# Patient Record
Sex: FEMALE | Race: WHITE | Employment: OTHER | URBAN - METROPOLITAN AREA
[De-identification: names, ages, dates, MRNs, and addresses within clinical notes are randomized per-mention and may not be internally consistent; named-entity substitution may affect disease eponyms.]

---

## 2019-04-15 ENCOUNTER — ED HISTORICAL/CONVERTED ENCOUNTER (OUTPATIENT)
Dept: OTHER | Age: 74
End: 2019-04-15

## 2020-07-28 ENCOUNTER — TELEPHONE (OUTPATIENT)
Dept: INTERNAL MEDICINE CLINIC | Age: 75
End: 2020-07-28

## 2020-07-31 DIAGNOSIS — G62.9 NEUROPATHY: Primary | ICD-10-CM

## 2020-07-31 RX ORDER — GABAPENTIN 300 MG/1
CAPSULE ORAL
Qty: 90 CAP | Refills: 0 | Status: SHIPPED | OUTPATIENT
Start: 2020-07-31 | End: 2020-08-25

## 2020-08-16 RX ORDER — FUROSEMIDE 20 MG/1
TABLET ORAL
Qty: 90 TAB | Refills: 2 | Status: SHIPPED | OUTPATIENT
Start: 2020-08-16 | End: 2020-09-25 | Stop reason: SDUPTHER

## 2020-08-17 RX ORDER — BUSPIRONE HYDROCHLORIDE 10 MG/1
TABLET ORAL
Qty: 180 TAB | Refills: 1 | Status: SHIPPED | OUTPATIENT
Start: 2020-08-17 | End: 2020-10-19 | Stop reason: SDUPTHER

## 2020-08-18 DIAGNOSIS — F31.9 BIPOLAR DISORDER, UNSPECIFIED (HCC): ICD-10-CM

## 2020-08-18 RX ORDER — QUETIAPINE FUMARATE 50 MG/1
TABLET, FILM COATED ORAL
Qty: 90 TAB | Refills: 1 | Status: SHIPPED | OUTPATIENT
Start: 2020-08-18 | End: 2020-12-07 | Stop reason: SDUPTHER

## 2020-08-25 DIAGNOSIS — G62.9 NEUROPATHY: ICD-10-CM

## 2020-08-25 RX ORDER — GABAPENTIN 300 MG/1
CAPSULE ORAL
Qty: 90 CAP | Refills: 0 | Status: SHIPPED | OUTPATIENT
Start: 2020-08-25 | End: 2020-09-30 | Stop reason: SDUPTHER

## 2020-09-17 ENCOUNTER — OFFICE VISIT (OUTPATIENT)
Dept: INTERNAL MEDICINE CLINIC | Age: 75
End: 2020-09-17
Payer: MEDICARE

## 2020-09-17 VITALS
DIASTOLIC BLOOD PRESSURE: 70 MMHG | RESPIRATION RATE: 20 BRPM | HEART RATE: 84 BPM | TEMPERATURE: 97.5 F | SYSTOLIC BLOOD PRESSURE: 140 MMHG

## 2020-09-17 DIAGNOSIS — I48.0 PAROXYSMAL ATRIAL FIBRILLATION (HCC): ICD-10-CM

## 2020-09-17 DIAGNOSIS — F31.9 BIPOLAR I DISORDER, CURRENT EPISODE DEPRESSED (HCC): Primary | ICD-10-CM

## 2020-09-17 PROBLEM — D53.9 MACROCYTIC ANEMIA: Status: ACTIVE | Noted: 2018-12-30

## 2020-09-17 PROBLEM — R53.81 MALAISE: Status: ACTIVE | Noted: 2018-12-29

## 2020-09-17 PROBLEM — F31.78 BIPOLAR DISORDER, IN FULL REMISSION, MOST RECENT EPISODE MIXED (HCC): Status: ACTIVE | Noted: 2018-12-30

## 2020-09-17 PROBLEM — R53.1 GENERALIZED WEAKNESS: Status: ACTIVE | Noted: 2018-12-29

## 2020-09-17 PROBLEM — J01.00 ACUTE NON-RECURRENT MAXILLARY SINUSITIS: Status: ACTIVE | Noted: 2018-12-30

## 2020-09-17 PROBLEM — N18.30 CKD (CHRONIC KIDNEY DISEASE) STAGE 3, GFR 30-59 ML/MIN (HCC): Status: ACTIVE | Noted: 2019-10-08

## 2020-09-17 PROBLEM — F54 PSYCHOLOGICAL FACTORS AFFECTING MEDICAL CONDITION: Status: ACTIVE | Noted: 2019-03-10

## 2020-09-17 PROBLEM — E03.9 ACQUIRED HYPOTHYROIDISM: Status: ACTIVE | Noted: 2018-12-30

## 2020-09-17 PROBLEM — G54.6 PHANTOM LIMB PAIN (HCC): Status: ACTIVE | Noted: 2018-12-30

## 2020-09-17 PROCEDURE — G8427 DOCREV CUR MEDS BY ELIG CLIN: HCPCS | Performed by: INTERNAL MEDICINE

## 2020-09-17 PROCEDURE — G8400 PT W/DXA NO RESULTS DOC: HCPCS | Performed by: INTERNAL MEDICINE

## 2020-09-17 PROCEDURE — 99213 OFFICE O/P EST LOW 20 MIN: CPT | Performed by: INTERNAL MEDICINE

## 2020-09-17 PROCEDURE — 3017F COLORECTAL CA SCREEN DOC REV: CPT | Performed by: INTERNAL MEDICINE

## 2020-09-17 PROCEDURE — G8421 BMI NOT CALCULATED: HCPCS | Performed by: INTERNAL MEDICINE

## 2020-09-17 PROCEDURE — 1090F PRES/ABSN URINE INCON ASSESS: CPT | Performed by: INTERNAL MEDICINE

## 2020-09-17 PROCEDURE — G9717 DOC PT DX DEP/BP F/U NT REQ: HCPCS | Performed by: INTERNAL MEDICINE

## 2020-09-17 PROCEDURE — G8536 NO DOC ELDER MAL SCRN: HCPCS | Performed by: INTERNAL MEDICINE

## 2020-09-17 PROCEDURE — 1101F PT FALLS ASSESS-DOCD LE1/YR: CPT | Performed by: INTERNAL MEDICINE

## 2020-09-17 NOTE — PROGRESS NOTES
Joann Presley is a 77 y.o. female presenting for Chief Complaint Patient presents with  Depression HPI Avel Fairbanks is in and she seen the neurologist about her tremors in the have pretty much decided that is probably familial.  Suggested the potential that it was from the apixaban just familial.  I would favor familial.  Also her psychiatric PA has decreased the Seroquel without much help. Seems that the main reason for the visit today is that #1 Suzie's wheelchair is broken and is in need of replacement and #2 when she gets a replacement she needs an oversized chair because she is squished into this regular wheelchair and it is uncomfortable for her to sit in. A wheelchair is necessary for her Avel Fairbanks to be able to be at home. Without the wheelchair she would be unable to get to and from the toilet and be unable to care for toileting needs and she would be unable to get to the kitchen table to eat. Originally when she got a wheelchair she was able to self propel but she has gotten weaker with time and she is unable to self propel at the present time. However there is always someone around 24/7 who is able to push her around and propell the chair. No past medical history on file. Current Outpatient Medications on File Prior to Visit Medication Sig Dispense Refill  gabapentin (NEURONTIN) 300 mg capsule TAKE 1 CAPSULE BY MOUTH THREE TIMES A DAY 90 Cap 0  
 QUEtiapine (SEROquel) 50 mg tablet TAKE 1 TABLET BY MOUTH EVERYDAY AT BEDTIME 90 Tab 1  
 busPIRone (BUSPAR) 10 mg tablet TAKE 1 TABLET TWICE DAILY 180 Tab 1  
 furosemide (LASIX) 20 mg tablet TAKE 1 TABLET BY MOUTH EVERY DAY 90 Tab 2 No current facility-administered medications on file prior to visit. ROS Visit Vitals BP (!) 140/70 (BP 1 Location: Left arm, BP Patient Position: Sitting) Pulse 84 Temp 97.5 °F (36.4 °C) Resp 20 Physical Exam morbidly obese  woman seated in wheelchair.   She does look rather uncomfortable in the standard chair given her size. ENT is unrevealing lungs with distant breath sounds but no wheezes heart sounds are distant irregularly irregular with chronic atrial fib previous right AKA amputation Assessment & Plan they will tell us where they want to order the chair from and we will send a copy of this note to the insurance company. They are preparing to go to [place] by car and we discussed safety measures along the way. I will see her routinely in 3 or 4 months.

## 2020-09-17 NOTE — PROGRESS NOTES
Danville Adam presents today for Chief Complaint Patient presents with  Depression Is someone accompanying this pt? yes Is the patient using any DME equipment during OV? yes Depression Screening: 
3 most recent PHQ Screens 9/17/2020 PHQ Not Done Active Diagnosis of Depression or Bipolar Disorder Learning Assessment: 
Learning Assessment 9/17/2020 PRIMARY LEARNER Patient HIGHEST LEVEL OF EDUCATION - PRIMARY LEARNER  GRADUATED HIGH SCHOOL OR GED  
BARRIERS PRIMARY LEARNER NONE  
CO-LEARNER CAREGIVER No  
PRIMARY LANGUAGE ENGLISH  
LEARNER PREFERENCE PRIMARY DEMONSTRATION  
ANSWERED BY patient RELATIONSHIP SELF Abuse Screening: No flowsheet data found. Fall Risk Fall Risk Assessment, last 12 mths 9/17/2020 Able to walk? No  
 
 
ADL No flowsheet data found. Health Maintenance reviewed and discussed and ordered per Provider. Health Maintenance Due Topic Date Due  
 Hepatitis C Screening  1945  
 DTaP/Tdap/Td series (1 - Tdap) 08/18/1967  Lipid Screen  08/18/1986  Shingrix Vaccine Age 50> (1 of 2) 08/18/1996  Breast Cancer Screen Mammogram  08/18/1996  
 FOBT Q1Y Age 50-75  08/18/1996  GLAUCOMA SCREENING Q2Y  08/18/2011  Bone Densitometry (Dexa) Screening  08/18/2011  Pneumococcal 65+ years (1 of 1 - PPSV23) 08/18/2011  Flu Vaccine (1) 09/01/2020  Medicare Yearly Exam  09/17/2020 Florencia Fields Coordination of Care: 1. Have you been to the ER, urgent care clinic since your last visit? Hospitalized since your last visit? no 
 
2. Have you seen or consulted any other health care providers outside of the [address] [place] [address] since your last visit? Include any pap smears or colon screening.  no

## 2020-09-18 ENCOUNTER — TELEPHONE (OUTPATIENT)
Dept: INTERNAL MEDICINE CLINIC | Age: 75
End: 2020-09-18

## 2020-09-18 DIAGNOSIS — Z89.9 AMPUTEE: Primary | ICD-10-CM

## 2020-09-18 NOTE — TELEPHONE ENCOUNTER
Patient calls to let you know the place that she wants her wheelchair order sent to is [facility] and the phone number is [phone] or [phone]    PLEASE SIGN ORDER SO THIS ENCOUNTER CAN BE CLOSED

## 2020-09-23 RX ORDER — BACLOFEN 10 MG/1
TABLET ORAL
Qty: 270 TAB | Refills: 2 | Status: SHIPPED | OUTPATIENT
Start: 2020-09-23

## 2020-09-25 DIAGNOSIS — R60.9 EDEMA, UNSPECIFIED TYPE: Primary | ICD-10-CM

## 2020-09-28 RX ORDER — FUROSEMIDE 20 MG/1
40 TABLET ORAL DAILY
Qty: 180 TAB | Refills: 3 | Status: SHIPPED | OUTPATIENT
Start: 2020-09-28 | End: 2021-01-01

## 2020-09-30 DIAGNOSIS — G62.9 NEUROPATHY: ICD-10-CM

## 2020-09-30 RX ORDER — GABAPENTIN 300 MG/1
300 CAPSULE ORAL 3 TIMES DAILY
Qty: 90 CAP | Refills: 0 | Status: SHIPPED | OUTPATIENT
Start: 2020-09-30 | End: 2020-10-30

## 2020-09-30 RX ORDER — LANOLIN ALCOHOL/MO/W.PET/CERES
CREAM (GRAM) TOPICAL
Qty: 90 TAB | Refills: 2 | Status: SHIPPED | OUTPATIENT
Start: 2020-09-30 | End: 2021-01-01

## 2020-10-12 ENCOUNTER — APPOINTMENT (OUTPATIENT)
Dept: CT IMAGING | Age: 75
End: 2020-10-12
Attending: EMERGENCY MEDICINE
Payer: MEDICARE

## 2020-10-12 ENCOUNTER — HOSPITAL ENCOUNTER (OUTPATIENT)
Age: 75
Setting detail: OBSERVATION
Discharge: HOME | End: 2020-10-15
Attending: EMERGENCY MEDICINE | Admitting: INTERNAL MEDICINE
Payer: MEDICARE

## 2020-10-12 DIAGNOSIS — N39.0 URINARY TRACT INFECTION WITH HEMATURIA, SITE UNSPECIFIED: Primary | ICD-10-CM

## 2020-10-12 DIAGNOSIS — R31.9 URINARY TRACT INFECTION WITH HEMATURIA, SITE UNSPECIFIED: Primary | ICD-10-CM

## 2020-10-12 DIAGNOSIS — R77.8 ELEVATED TROPONIN: ICD-10-CM

## 2020-10-12 LAB
ALBUMIN SERPL-MCNC: 2.9 G/DL (ref 3.5–4.7)
ALBUMIN SERPL-MCNC: 3.3 G/DL (ref 3.5–4.7)
ALBUMIN/GLOB SERPL: 0.9 {RATIO}
ALBUMIN/GLOB SERPL: 0.9 {RATIO}
ALP SERPL-CCNC: 59 U/L (ref 38–126)
ALP SERPL-CCNC: 65 U/L (ref 38–126)
ALT SERPL-CCNC: 15 U/L (ref 3–52)
ALT SERPL-CCNC: 16 U/L (ref 3–52)
ANION GAP SERPL CALC-SCNC: 13 MMOL/L
ANION GAP SERPL CALC-SCNC: 14 MMOL/L
APPEARANCE UR: ABNORMAL
AST SERPL W P-5'-P-CCNC: 25 U/L (ref 14–74)
AST SERPL W P-5'-P-CCNC: 27 U/L (ref 14–74)
BACTERIA URNS QL MICRO: ABNORMAL /HPF
BASOPHILS # BLD: 0 K/UL
BASOPHILS NFR BLD: 0 %
BILIRUB SERPL-MCNC: 0.4 MG/DL (ref 0.2–1)
BILIRUB SERPL-MCNC: 0.5 MG/DL (ref 0.2–1)
BILIRUB UR QL: NEGATIVE
BUN SERPL-MCNC: 35 MG/DL (ref 9–21)
BUN SERPL-MCNC: 35 MG/DL (ref 9–21)
BUN/CREAT SERPL: 27
BUN/CREAT SERPL: 27
CA-I BLD-MCNC: 7.8 MG/DL (ref 8.5–10.5)
CA-I BLD-MCNC: 8.5 MG/DL (ref 8.5–10.5)
CHLORIDE SERPL-SCNC: 103 MMOL/L (ref 94–111)
CHLORIDE SERPL-SCNC: 106 MMOL/L (ref 94–111)
CO2 SERPL-SCNC: 21 MMOL/L (ref 21–33)
CO2 SERPL-SCNC: 22 MMOL/L (ref 21–33)
COLOR UR: ABNORMAL
CREAT SERPL-MCNC: 1.29 MG/DL (ref 0.7–1.2)
CREAT SERPL-MCNC: 1.3 MG/DL (ref 0.7–1.2)
DIFFERENTIAL METHOD BLD: ABNORMAL
EOSINOPHIL # BLD: 0 K/UL
EOSINOPHIL NFR BLD: 0 %
EPITH CASTS URNS QL MICRO: ABNORMAL /LPF (ref 0–20)
ERYTHROCYTE [DISTWIDTH] IN BLOOD BY AUTOMATED COUNT: 12.6 % (ref 11.6–14.5)
GLOBULIN SER CALC-MCNC: 3.2 G/DL
GLOBULIN SER CALC-MCNC: 3.6 G/DL
GLUCOSE SERPL-MCNC: 162 MG/DL (ref 70–110)
GLUCOSE SERPL-MCNC: 166 MG/DL (ref 70–110)
GLUCOSE UR STRIP.AUTO-MCNC: NEGATIVE MG/DL
HCT VFR BLD AUTO: 37.7 % (ref 35–45)
HGB BLD-MCNC: 11.9 G/DL (ref 12–16)
HGB UR QL STRIP: ABNORMAL
IMM GRANULOCYTES # BLD AUTO: 0 K/UL
IMM GRANULOCYTES NFR BLD AUTO: 0 %
KETONES UR QL STRIP.AUTO: NEGATIVE MG/DL
LEUKOCYTE ESTERASE UR QL STRIP.AUTO: ABNORMAL
LYMPHOCYTES # BLD: 0.9 K/UL
LYMPHOCYTES NFR BLD: 7 %
MCH RBC QN AUTO: 34.7 PG (ref 24–34)
MCHC RBC AUTO-ENTMCNC: 31.6 G/DL (ref 31–37)
MCV RBC AUTO: 109.9 FL (ref 74–97)
MONOCYTES # BLD: 1.6 K/UL
MONOCYTES NFR BLD: 13 %
NEUTS BAND NFR BLD MANUAL: 10 %
NEUTS SEG # BLD: 10.1 K/UL
NEUTS SEG NFR BLD: 70 %
NITRITE UR QL STRIP.AUTO: POSITIVE
PH UR STRIP: 6 [PH] (ref 5–9)
PLATELET # BLD AUTO: 213 K/UL (ref 135–420)
PMV BLD AUTO: 10 FL (ref 9.2–11.8)
POTASSIUM SERPL-SCNC: 4.2 MMOL/L (ref 3.2–5.1)
POTASSIUM SERPL-SCNC: 4.7 MMOL/L (ref 3.2–5.1)
PROT SERPL-MCNC: 6.1 G/DL (ref 6.1–8.4)
PROT SERPL-MCNC: 6.9 G/DL (ref 6.1–8.4)
PROT UR STRIP-MCNC: 30 MG/DL
RBC # BLD AUTO: 3.43 M/UL (ref 4.2–5.3)
RBC #/AREA URNS HPF: ABNORMAL /HPF (ref 0–2)
RBC MORPH BLD: ABNORMAL
SODIUM SERPL-SCNC: 139 MMOL/L (ref 135–145)
SODIUM SERPL-SCNC: 140 MMOL/L (ref 135–145)
SP GR UR REFRACTOMETRY: 1.02 (ref 1–1.03)
TROPONIN I SERPL-MCNC: 0.11 NG/ML (ref 0.02–0.05)
TROPONIN I SERPL-MCNC: 0.12 NG/ML (ref 0.02–0.05)
TROPONIN I SERPL-MCNC: 0.27 NG/ML (ref 0.02–0.05)
UROBILINOGEN UR QL STRIP.AUTO: 0.2 EU/DL (ref 0.2–1)
WBC # BLD AUTO: 12.6 K/UL (ref 4.6–13.2)
WBC URNS QL MICRO: ABNORMAL /HPF (ref 0–4)

## 2020-10-12 PROCEDURE — 84484 ASSAY OF TROPONIN QUANT: CPT

## 2020-10-12 PROCEDURE — 87077 CULTURE AEROBIC IDENTIFY: CPT

## 2020-10-12 PROCEDURE — 81001 URINALYSIS AUTO W/SCOPE: CPT

## 2020-10-12 PROCEDURE — 80053 COMPREHEN METABOLIC PANEL: CPT

## 2020-10-12 PROCEDURE — 70450 CT HEAD/BRAIN W/O DYE: CPT

## 2020-10-12 PROCEDURE — 87186 SC STD MICRODIL/AGAR DIL: CPT

## 2020-10-12 PROCEDURE — 74011250637 HC RX REV CODE- 250/637: Performed by: NURSE PRACTITIONER

## 2020-10-12 PROCEDURE — 74011250637 HC RX REV CODE- 250/637: Performed by: EMERGENCY MEDICINE

## 2020-10-12 PROCEDURE — 74011000258 HC RX REV CODE- 258: Performed by: EMERGENCY MEDICINE

## 2020-10-12 PROCEDURE — 99285 EMERGENCY DEPT VISIT HI MDM: CPT

## 2020-10-12 PROCEDURE — 74011250636 HC RX REV CODE- 250/636: Performed by: EMERGENCY MEDICINE

## 2020-10-12 PROCEDURE — 93005 ELECTROCARDIOGRAM TRACING: CPT

## 2020-10-12 PROCEDURE — 99218 HC RM OBSERVATION: CPT

## 2020-10-12 PROCEDURE — 96365 THER/PROPH/DIAG IV INF INIT: CPT

## 2020-10-12 PROCEDURE — 85025 COMPLETE CBC W/AUTO DIFF WBC: CPT

## 2020-10-12 PROCEDURE — 87086 URINE CULTURE/COLONY COUNT: CPT

## 2020-10-12 RX ORDER — BACLOFEN 10 MG/1
10 TABLET ORAL 3 TIMES DAILY
Status: DISCONTINUED | OUTPATIENT
Start: 2020-10-12 | End: 2020-10-15 | Stop reason: HOSPADM

## 2020-10-12 RX ORDER — FUROSEMIDE 40 MG/1
40 TABLET ORAL DAILY
Status: DISCONTINUED | OUTPATIENT
Start: 2020-10-13 | End: 2020-10-14

## 2020-10-12 RX ORDER — QUETIAPINE FUMARATE 25 MG/1
50 TABLET, FILM COATED ORAL
Status: DISCONTINUED | OUTPATIENT
Start: 2020-10-12 | End: 2020-10-15 | Stop reason: HOSPADM

## 2020-10-12 RX ORDER — AMIODARONE HYDROCHLORIDE 200 MG/1
1 TABLET ORAL DAILY
COMMUNITY
Start: 2020-08-02 | End: 2021-01-01

## 2020-10-12 RX ORDER — LEVOTHYROXINE SODIUM 100 UG/1
200 TABLET ORAL DAILY
Status: DISCONTINUED | OUTPATIENT
Start: 2020-10-13 | End: 2020-10-15 | Stop reason: HOSPADM

## 2020-10-12 RX ORDER — ACETAMINOPHEN 650 MG/1
650 SUPPOSITORY RECTAL
Status: DISCONTINUED | OUTPATIENT
Start: 2020-10-12 | End: 2020-10-15 | Stop reason: HOSPADM

## 2020-10-12 RX ORDER — ONDANSETRON 2 MG/ML
4 INJECTION INTRAMUSCULAR; INTRAVENOUS
Status: DISCONTINUED | OUTPATIENT
Start: 2020-10-12 | End: 2020-10-15 | Stop reason: HOSPADM

## 2020-10-12 RX ORDER — LANOLIN ALCOHOL/MO/W.PET/CERES
1 CREAM (GRAM) TOPICAL 2 TIMES DAILY
COMMUNITY

## 2020-10-12 RX ORDER — FOLIC ACID/VIT B COMPLEX AND C 0.8 MG
1 TABLET ORAL DAILY
COMMUNITY
Start: 2020-09-24 | End: 2021-01-01 | Stop reason: SDUPTHER

## 2020-10-12 RX ORDER — FOLIC ACID/VIT B COMPLEX AND C 0.8 MG
1 TABLET ORAL DAILY
COMMUNITY

## 2020-10-12 RX ORDER — SODIUM CHLORIDE 0.9 % (FLUSH) 0.9 %
5-40 SYRINGE (ML) INJECTION AS NEEDED
Status: DISCONTINUED | OUTPATIENT
Start: 2020-10-12 | End: 2020-10-15 | Stop reason: HOSPADM

## 2020-10-12 RX ORDER — DIMETHICONE 13 MG/ML
425 LOTION TOPICAL 2 TIMES DAILY
COMMUNITY

## 2020-10-12 RX ORDER — LORATADINE 10 MG/1
10 TABLET ORAL DAILY
Status: DISCONTINUED | OUTPATIENT
Start: 2020-10-13 | End: 2020-10-13

## 2020-10-12 RX ORDER — FLUTICASONE PROPIONATE 50 MCG
2 SPRAY, SUSPENSION (ML) NASAL DAILY
COMMUNITY
Start: 2019-01-01 | End: 2021-01-01

## 2020-10-12 RX ORDER — OMEGA-3 FATTY ACIDS 1000 MG
1 CAPSULE ORAL DAILY
COMMUNITY

## 2020-10-12 RX ORDER — SODIUM CHLORIDE 0.9 % (FLUSH) 0.9 %
5-40 SYRINGE (ML) INJECTION EVERY 8 HOURS
Status: DISCONTINUED | OUTPATIENT
Start: 2020-10-12 | End: 2020-10-13

## 2020-10-12 RX ORDER — GABAPENTIN 300 MG/1
300 CAPSULE ORAL 3 TIMES DAILY
Status: DISCONTINUED | OUTPATIENT
Start: 2020-10-12 | End: 2020-10-15 | Stop reason: HOSPADM

## 2020-10-12 RX ORDER — ACETAMINOPHEN 500 MG
1000 TABLET ORAL
COMMUNITY
End: 2020-10-12

## 2020-10-12 RX ORDER — POLYETHYLENE GLYCOL 3350 17 G/17G
17 POWDER, FOR SOLUTION ORAL DAILY PRN
Status: DISCONTINUED | OUTPATIENT
Start: 2020-10-12 | End: 2020-10-15 | Stop reason: HOSPADM

## 2020-10-12 RX ORDER — LANOLIN ALCOHOL/MO/W.PET/CERES
1 CREAM (GRAM) TOPICAL
Status: DISCONTINUED | OUTPATIENT
Start: 2020-10-13 | End: 2020-10-15 | Stop reason: HOSPADM

## 2020-10-12 RX ORDER — ONDANSETRON 4 MG/1
4 TABLET, ORALLY DISINTEGRATING ORAL
Status: DISCONTINUED | OUTPATIENT
Start: 2020-10-12 | End: 2020-10-15 | Stop reason: HOSPADM

## 2020-10-12 RX ORDER — AMIODARONE HYDROCHLORIDE 200 MG/1
200 TABLET ORAL DAILY
Status: DISCONTINUED | OUTPATIENT
Start: 2020-10-13 | End: 2020-10-15 | Stop reason: HOSPADM

## 2020-10-12 RX ORDER — MINERAL OIL
1 ENEMA (ML) RECTAL DAILY
COMMUNITY

## 2020-10-12 RX ORDER — APIXABAN 2.5 MG/1
1 TABLET, FILM COATED ORAL DAILY
COMMUNITY
Start: 2020-09-18 | End: 2020-12-16

## 2020-10-12 RX ORDER — DICLOFENAC SODIUM 10 MG/G
1 GEL TOPICAL AS NEEDED
COMMUNITY
End: 2021-01-01 | Stop reason: ALTCHOICE

## 2020-10-12 RX ORDER — DIVALPROEX SODIUM 500 MG/1
1000 TABLET, EXTENDED RELEASE ORAL EVERY 12 HOURS
Status: DISCONTINUED | OUTPATIENT
Start: 2020-10-12 | End: 2020-10-15 | Stop reason: HOSPADM

## 2020-10-12 RX ORDER — ACETAMINOPHEN 325 MG/1
650 TABLET ORAL
Status: DISCONTINUED | OUTPATIENT
Start: 2020-10-12 | End: 2020-10-15 | Stop reason: HOSPADM

## 2020-10-12 RX ORDER — LEVOTHYROXINE SODIUM 200 UG/1
1 TABLET ORAL DAILY
COMMUNITY
Start: 2020-10-08 | End: 2021-01-01

## 2020-10-12 RX ORDER — GABAPENTIN 100 MG/1
300 CAPSULE ORAL ONCE
Status: COMPLETED | OUTPATIENT
Start: 2020-10-12 | End: 2020-10-12

## 2020-10-12 RX ORDER — LISINOPRIL 5 MG/1
10 TABLET ORAL DAILY
Status: DISCONTINUED | OUTPATIENT
Start: 2020-10-13 | End: 2020-10-15 | Stop reason: HOSPADM

## 2020-10-12 RX ORDER — DIVALPROEX SODIUM 500 MG/1
1000 TABLET, EXTENDED RELEASE ORAL EVERY 12 HOURS
COMMUNITY
Start: 2019-10-10 | End: 2021-01-01 | Stop reason: DRUGHIGH

## 2020-10-12 RX ORDER — LANOLIN ALCOHOL/MO/W.PET/CERES
400 CREAM (GRAM) TOPICAL DAILY
Status: DISCONTINUED | OUTPATIENT
Start: 2020-10-13 | End: 2020-10-15 | Stop reason: HOSPADM

## 2020-10-12 RX ORDER — LISINOPRIL 10 MG/1
1 TABLET ORAL DAILY
COMMUNITY
End: 2021-01-01

## 2020-10-12 RX ORDER — BUSPIRONE HYDROCHLORIDE 5 MG/1
10 TABLET ORAL 2 TIMES DAILY
Status: DISCONTINUED | OUTPATIENT
Start: 2020-10-12 | End: 2020-10-14

## 2020-10-12 RX ORDER — IBUPROFEN 200 MG
400 TABLET ORAL DAILY PRN
COMMUNITY
End: 2021-01-01 | Stop reason: SDUPTHER

## 2020-10-12 RX ADMIN — BUSPIRONE HYDROCHLORIDE 10 MG: 5 TABLET ORAL at 22:00

## 2020-10-12 RX ADMIN — DIVALPROEX SODIUM 1000 MG: 500 TABLET, EXTENDED RELEASE ORAL at 20:39

## 2020-10-12 RX ADMIN — CEFTRIAXONE SODIUM 1 G: 1 INJECTION, POWDER, FOR SOLUTION INTRAMUSCULAR; INTRAVENOUS at 15:07

## 2020-10-12 RX ADMIN — SODIUM CHLORIDE 500 ML: 9 INJECTION, SOLUTION INTRAVENOUS at 13:30

## 2020-10-12 RX ADMIN — BACLOFEN 10 MG: 10 TABLET ORAL at 22:00

## 2020-10-12 RX ADMIN — GABAPENTIN 300 MG: 100 CAPSULE ORAL at 16:10

## 2020-10-12 RX ADMIN — Medication 10 ML: at 20:39

## 2020-10-12 RX ADMIN — ACETAMINOPHEN 650 MG: 325 TABLET, FILM COATED ORAL at 20:40

## 2020-10-12 RX ADMIN — GABAPENTIN 300 MG: 300 CAPSULE ORAL at 22:00

## 2020-10-12 NOTE — ED TRIAGE NOTES
Pt reports that she woke up this morning feeling weak, reports that she has chronic UTIs and she feels as if she has one of her \"normal\" UTIs. Reports foul odor to the urine, burning when urinating, and frequency. Per EMS temp was 103 upon their arrival to the home, pt does take tylenol every morning and did take some this morning, temp upon arrival was 100.1.

## 2020-10-12 NOTE — ED PROVIDER NOTES
EMERGENCY DEPARTMENT HISTORY AND PHYSICAL EXAM 
 
 
Date: 10/12/2020 Patient Name: Jenniffer Watts History of Presenting Illness Chief Complaint Patient presents with  Fatigue History Provided By: Patient HPI: Jenniffer Watts, 77 y.o. female with PMHx of R AKA presents to the ED via EMS with complaints of fatigue. Pt states she has had sxs of weakness and fatigue since 0500 this morning. Pt notes she was unable to get out of bed this morning because of having generalized body aches, prompting her call to EMS. Pt notes she usually walks by herself with her wheelchair but denies being able to ambulate today. Pt also notes of burning with urination and urinary frequency for \"a while\" now. Pt also notes it feels as if her R leg spasms when she urinates. Pt endorses a fever, stating she had a fever of 103 at home but had a 100.1 fever upon arrival of EMS. Pt endorses taking Tylenol every morning that she believes could be why her temperature had decreased. Pt also currently c/o shoulder and neck pain, but denies chills or headache. There are no other complaints, changes, or physical findings at this time. PCP: Alana Moeller MD 
 
Current Outpatient Medications Medication Sig Dispense Refill  divalproex ER (DEPAKOTE ER) 500 mg ER tablet Take 1,000 mg by mouth every twelve (12) hours.  fluticasone propionate (FLONASE) 50 mcg/actuation nasal spray 2 Sprays by Both Nostrils route daily.  fexofenadine (Allegra Allergy) 180 mg tablet Take 1 Tab by mouth daily.  omega-3 fatty acids (Fish Oil Concentrate) 1,000 mg cap Take 1 Tab by mouth daily.  magnesium oxide (MAG-OX) 400 mg tablet Take 1 Tab by mouth daily.  b complex-vitamin c-folic acid 0.8 mg (Isabel-Seth) 0.8 mg tab tablet Take 1 Tab by mouth daily.  diclofenac (Voltaren) 1 % gel Apply 1 Applicator to affected area as needed.  acetaminophen (TYLENOL) 500 mg tablet Take 1,000 mg by mouth two (2) times daily as needed.  amiodarone (CORDARONE) 200 mg tablet Take 1 Tab by mouth daily.  Eliquis 2.5 mg tablet Take 1 Tab by mouth daily.  cranberry extract 425 mg cap Take 425 mg by mouth two (2) times a day.  Isabel-Seth 0.8 mg tab tablet Take 1 Tab by mouth daily.  ibuprofen (MOTRIN) 200 mg tablet Take 400 mg by mouth daily as needed.  levothyroxine (SYNTHROID) 200 mcg tablet Take 1 Tab by mouth daily.  lisinopriL (PRINIVIL, ZESTRIL) 10 mg tablet Take 1 Tab by mouth daily.  gabapentin (NEURONTIN) 300 mg capsule Take 1 Cap by mouth three (3) times daily. Max Daily Amount: 900 mg. Indications: neuropathic pain 90 Cap 0  
 ferrous sulfate 325 mg (65 mg iron) tablet TAKE 1 TABLET EVERY DAY 90 Tab 2  
 furosemide (LASIX) 20 mg tablet Take 2 Tabs by mouth daily. 180 Tab 3  
 baclofen (LIORESAL) 10 mg tablet TAKE 1 TABLET THREE TIMES DAILY 270 Tab 2  
 QUEtiapine (SEROquel) 50 mg tablet TAKE 1 TABLET BY MOUTH EVERYDAY AT BEDTIME 90 Tab 1  
 busPIRone (BUSPAR) 10 mg tablet TAKE 1 TABLET TWICE DAILY 180 Tab 1 Past History Past Medical History: 
Past Medical History:  
Diagnosis Date  CHF (congestive heart failure) ([facility] [address]  Chronic neck and back pain  Frequent UTI  Hypercholesteremia  Hypertension  Obese Past Surgical History: 
Past Surgical History:  
Procedure Laterality Date  HX ABOVE KNEE AMPUTATION    
 right  HX CERVICAL FUSION    
 HX CHOLECYSTECTOMY  HX GASTRIC BYPASS  HX HYSTERECTOMY  HX ORTHOPAEDIC    
 neck surgery  HX PARTIAL THYROIDECTOMY Family History: 
History reviewed. No pertinent family history. Social History: 
Social History Tobacco Use  Smoking status: Never Smoker  Smokeless tobacco: Never Used Substance Use Topics  Alcohol use: Yes Comment: rare  Drug use: Never Allergies: Allergies Allergen Reactions  Codeine Unknown (comments)  Hydromorphone Unknown (comments) Patient states it makes her not stop talking, jittery  Prednisone Unknown (comments)  Succinylcholine Chloride Unknown (comments) Review of Systems Review of Systems Constitutional: Positive for fatigue and fever. Negative for chills and diaphoresis. HENT: Negative for congestion, ear pain and sore throat. Eyes: Negative for pain, redness and visual disturbance. Respiratory: Negative for cough, shortness of breath and wheezing. Cardiovascular: Negative for chest pain and leg swelling. Gastrointestinal: Negative for abdominal pain, diarrhea, nausea and vomiting. Endocrine: Negative for polydipsia, polyphagia and polyuria. Genitourinary: Positive for frequency. Negative for dysuria and hematuria. Burning with urination Musculoskeletal: Negative for neck pain and neck stiffness. Skin: Negative for color change and pallor. Neurological: Positive for weakness. Negative for light-headedness and headaches. Psychiatric/Behavioral: Negative for confusion. The patient is not nervous/anxious. Physical Exam  
Physical Exam 
Vitals signs and nursing note reviewed. Constitutional:   
   General: She is awake. She is not in acute distress. Appearance: She is well-developed and well-groomed. She is obese. HENT:  
   Head: Normocephalic and atraumatic. Eyes:  
   Extraocular Movements: Extraocular movements intact. Pupils: Pupils are equal, round, and reactive to light. Neck: Musculoskeletal: Full passive range of motion without pain, normal range of motion and neck supple. Cardiovascular:  
   Rate and Rhythm: Normal rate and regular rhythm. Heart sounds: Normal heart sounds. Pulmonary:  
   Effort: Pulmonary effort is normal.  
   Breath sounds: Normal breath sounds and air entry. Abdominal:  
   Palpations: Abdomen is soft. Feet:  
   Right foot: Amputation: Right leg is amputated above knee. Skin: 
   General: Skin is warm and dry. Comments: Bruises easily in bilateral upper extremities Neurological:  
   General: No focal deficit present. Mental Status: She is alert and oriented to person, place, and time. Psychiatric:     
   Attention and Perception: Attention and perception normal.     
   Mood and Affect: Mood and affect normal.     
   Speech: Speech normal.     
   Behavior: Behavior normal. Behavior is cooperative. Thought Content: Thought content normal.     
   Cognition and Memory: Cognition and memory normal.     
   Judgment: Judgment normal.  
 
 
 
Diagnostic Study Results Labs - Recent Results (from the past 12 hour(s)) URINALYSIS W/ RFLX MICROSCOPIC Collection Time: 10/12/20 12:30 PM  
Result Value Ref Range Color CIT Group Appearance Cloudy (A) Clear Specific gravity 1.020 1.003 - 1.035    
 pH (UA) 6.0 5.0 - 9.0 Protein 30 (A) Negative mg/dL Glucose Negative Negative mg/dL Ketone Negative Negative mg/dL Bilirubin Negative Negative Blood Large (A) Negative Urobilinogen 0.2 0.2 - 1.0 EU/dL Nitrites Positive (A) Negative Leukocyte Esterase Large (A) Negative URINE MICROSCOPIC Collection Time: 10/12/20 12:30 PM  
Result Value Ref Range WBC  0 - 4 /hpf  
 RBC 5-10 0 - 2 /hpf Epithelial cells Few 0 - 20 /lpf Bacteria 3+ (A) None /hpf  
CBC WITH AUTOMATED DIFF Collection Time: 10/12/20 12:55 PM  
Result Value Ref Range WBC 12.6 4.6 - 13.2 K/uL  
 RBC 3.43 (L) 4.20 - 5.30 M/uL  
 HGB 11.9 (L) 12.0 - 16.0 g/dL HCT 37.7 35.0 - 45.0 % .9 (H) 74.0 - 97.0 FL  
 MCH 34.7 (H) 24.0 - 34.0 PG  
 MCHC 31.6 31.0 - 37.0 g/dL  
 RDW 12.6 11.6 - 14.5 % PLATELET 279 528 - 236 K/uL MPV 10.0 9.2 - 11.8 FL  
 NEUTROPHILS 70 % BAND NEUTROPHILS 10 % LYMPHOCYTES 7 % MONOCYTES 13 % EOSINOPHILS 0 % BASOPHILS 0 % IMMATURE GRANULOCYTES 0 %  
 ABS. NEUTROPHILS 10.1 K/UL  
 ABS. LYMPHOCYTES 0.9 K/UL  
 ABS. MONOCYTES 1.6 K/UL  
 ABS. EOSINOPHILS 0.0 K/UL  
 ABS. BASOPHILS 0.0 K/UL  
 ABS. IMM. GRANS. 0.0 K/UL  
 DF Manual    
 RBC COMMENTS Macrocytosis 1+ TROPONIN I Collection Time: 10/12/20  3:50 PM  
Result Value Ref Range Troponin-I, Qt. 0.11 (H) 0.02 - 0.05 ng/mL METABOLIC PANEL, COMPREHENSIVE Collection Time: 10/12/20  3:50 PM  
Result Value Ref Range Sodium 140 135 - 145 mmol/L Potassium 4.2 3.2 - 5.1 mmol/L Chloride 106 94 - 111 mmol/L  
 CO2 21 21 - 33 mmol/L Anion gap 13 mmol/L Glucose 162 (H) 70 - 110 mg/dL BUN 35 (H) 9 - 21 mg/dL Creatinine 1.30 (H) 0.70 - 1.20 mg/dL BUN/Creatinine ratio 27 GFR est AA 49 ml/min/1.73m2 GFR est non-AA 40 ml/min/1.73m2 Calcium 7.8 (L) 8.5 - 10.5 mg/dL Bilirubin, total 0.5 0.2 - 1.0 mg/dL AST (SGOT) 27 14 - 74 U/L  
 ALT (SGPT) 16 3 - 52 U/L Alk. phosphatase 59 38 - 126 U/L Protein, total 6.1 6.1 - 8.4 g/dL Albumin 2.9 (L) 3.5 - 4.7 g/dL Globulin 3.2 g/dL A-G Ratio 0.9 Radiologic Studies -  
CT HEAD WO CONT Final Result IMPRESSION:  
  
  
1. No acute intracranial abnormality. 2. Mild periventricular white matter low-attenuation, as can be seen with  
chronic ischemic microvascular change. CT Results  (Last 48 hours) 10/12/20 1317  CT HEAD WO CONT Final result Impression:  IMPRESSION:  
   
   
1. No acute intracranial abnormality. 2. Mild periventricular white matter low-attenuation, as can be seen with  
chronic ischemic microvascular change. Narrative:  EXAM: CT head INDICATION: Fatigue with near syncopal episode COMPARISON: CT head 10/11/2018 TECHNIQUE: Axial CT imaging of the head was performed without intravenous  
contrast. Standard multiplanar coronal and sagittal reformatted images were  
obtained and are included in interpretation. One or more dose reduction techniques were used on this CT: automated exposure  
control, adjustment of the mAs and/or kVp according to patient size, and  
iterative reconstruction techniques. The specific techniques used on this CT  
exam have been documented in the patient's electronic medical record. Digital  
Imaging and Communications in Medicine (DICOM) format image data are available  
to nonaffiliated external healthcare facilities or entities on a secure, media  
free, reciprocally searchable basis with patient authorization for at least a  
12-month period after this study. _______________ FINDINGS:  
   
BRAIN AND POSTERIOR FOSSA: The sulci, folia, ventricles and basal cisterns are  
within normal limits for the patient?s age. There is no intracranial hemorrhage,  
mass effect, or midline shift. Gray-white matter differentiation is within  
normal limits. Mild periventricular white matter low-attenuation is noted. EXTRA-AXIAL SPACES AND MENINGES: There are no abnormal extra-axial fluid  
collections. CALVARIUM: Intact. SINUSES: Paranasal sinuses and mastoid air cells are clear. OTHER: None.  
   
_______________ Medical Decision Making and ED Course I am the first provider for this patient. I reviewed the vital signs, available nursing notes, past medical history, past surgical history, family history and social history. Vital Signs-Reviewed the patient's vital signs. Patient Vitals for the past 12 hrs: 
 Temp Pulse Resp BP SpO2  
10/12/20 1530  67 18 (!) 113/59 100 % 10/12/20 1500  65 17 106/72 99 % 10/12/20 1430  66 17 137/71 99 % 10/12/20 1400  65 17 (!) 113/52 99 % 10/12/20 1330  63 18 124/61 99 % 10/12/20 1300  67 17 132/69 100 % 10/12/20 1235  65 18 (!) 108/51 99 % 10/12/20 1219 100.1 °F (37.8 °C) 64 17 (!) 133/56 99 % EKG:  normal sinus rhythm, Incomplete LBBB. Rate 107 ED Course: Initial assessment performed. The patients presenting problems have been discussed, and they are in agreement with the care plan formulated and outlined with them. I have encouraged them to ask questions as they arise throughout their visit. 1819: I spoke with Dr. Natalia Valiente, hospitalist, in regards to pt admission; physician agreed to pt admission for observation. Provider Notes (Medical Decision Making):  
 
 
Procedures Disposition DISCHARGE PLAN: 
1. Current Discharge Medication List  
  
CONTINUE these medications which have NOT CHANGED Details  
gabapentin (NEURONTIN) 300 mg capsule Take 1 Cap by mouth three (3) times daily. Max Daily Amount: 900 mg. Indications: neuropathic pain 
Qty: 90 Cap, Refills: 0 Comments: Not to exceed 5 additional fills before 01/27/2021 Associated Diagnoses: Neuropathy  
  
ferrous sulfate 325 mg (65 mg iron) tablet TAKE 1 TABLET EVERY DAY Qty: 90 Tab, Refills: 2  
  
furosemide (LASIX) 20 mg tablet Take 2 Tabs by mouth daily. Qty: 180 Tab, Refills: 3 Associated Diagnoses: Edema, unspecified type  
  
baclofen (LIORESAL) 10 mg tablet TAKE 1 TABLET THREE TIMES DAILY Qty: 270 Tab, Refills: 2 QUEtiapine (SEROquel) 50 mg tablet TAKE 1 TABLET BY MOUTH EVERYDAY AT BEDTIME Qty: 90 Tab, Refills: 1 Associated Diagnoses: Bipolar disorder, unspecified ([address] busPIRone (BUSPAR) 10 mg tablet TAKE 1 TABLET TWICE DAILY Qty: 180 Tab, Refills: 1 2. Follow-up Information Follow up With Specialties Details Why Contact Info Johnny Moeller MD Internal Medicine, Internal Medicine In 2 days If symptoms worsen, As needed [address] 
[facility] [place] [id number] [phone] [facility] EMERGENCY DEPT Emergency Medicine  If symptoms worsen [facility] [address] 3. Return to ED if worse Diagnosis Clinical Impression: 1. Urinary tract infection with hematuria, site unspecified 2. Elevated troponin Attestations: 
 
By signing my name below, Afsaneh Hendersonjarek, attest that this documentation has been prepared under the direction and in presence of Dr. Ursula Mcpherson on 10/12/20. Electronically signed: Kimberly Palmer, 10/12/20, 11:57 AM 
 
 
 
Please note that this dictation was completed with [employer], the computer voice recognition software. Quite often unanticipated grammatical, syntax, homophones, and other interpretive errors are inadvertently transcribed by the computer software. Please disregard these errors. Please excuse any errors that have escaped final proofreading. Thank you.

## 2020-10-12 NOTE — ROUTINE PROCESS
TRANSFER - OUT REPORT: 
 
Verbal report given to Rosanna(name) on Meredith Magdaleno  being transferred to (unit) for routine progression of care Report consisted of patients Situation, Background, Assessment and  
Recommendations(SBAR). Information from the following report(s) SBAR, ED Summary, [facility] and Recent Results was reviewed with the receiving nurse. Lines:  
Peripheral IV 10/12/20 Left;Posterior Hand (Active) Site Assessment Clean, dry, & intact 10/12/20 1219 Phlebitis Assessment 0 10/12/20 1219 Infiltration Assessment 0 10/12/20 1219 Dressing Status Clean, dry, & intact 10/12/20 1219 Dressing Type Transparent 10/12/20 1219 Hub Color/Line Status Green 10/12/20 1219 Alcohol Cap Used No 10/12/20 1219 Opportunity for questions and clarification was provided. Patient transported with: 
 Monitor Registered Nurse

## 2020-10-13 ENCOUNTER — APPOINTMENT (OUTPATIENT)
Dept: NON INVASIVE DIAGNOSTICS | Age: 75
End: 2020-10-13
Attending: NURSE PRACTITIONER
Payer: MEDICARE

## 2020-10-13 LAB
ANION GAP SERPL CALC-SCNC: 8 MMOL/L
ATRIAL RATE: 113 BPM
ATRIAL RATE: 93 BPM
BUN SERPL-MCNC: 32 MG/DL (ref 9–21)
BUN/CREAT SERPL: 29
CA-I BLD-MCNC: 8.1 MG/DL (ref 8.5–10.5)
CALCULATED P AXIS, ECG09: 72 DEGREES
CALCULATED P AXIS, ECG09: 76 DEGREES
CALCULATED R AXIS, ECG10: -36 DEGREES
CALCULATED R AXIS, ECG10: -38 DEGREES
CALCULATED T AXIS, ECG11: 108 DEGREES
CALCULATED T AXIS, ECG11: 95 DEGREES
CHLORIDE SERPL-SCNC: 103 MMOL/L (ref 94–111)
CO2 SERPL-SCNC: 27 MMOL/L (ref 21–33)
CREAT SERPL-MCNC: 1.1 MG/DL (ref 0.7–1.2)
DIAGNOSIS, 93000: NORMAL
DIAGNOSIS, 93000: NORMAL
ERYTHROCYTE [DISTWIDTH] IN BLOOD BY AUTOMATED COUNT: 12.6 % (ref 11.6–14.5)
GLUCOSE SERPL-MCNC: 122 MG/DL (ref 70–110)
HCT VFR BLD AUTO: 36.3 % (ref 35–45)
HGB BLD-MCNC: 11 G/DL (ref 12–16)
MAGNESIUM SERPL-MCNC: 1.9 MG/DL (ref 1.7–2.8)
MAGNESIUM SERPL-MCNC: 1.9 MG/DL (ref 1.7–2.8)
MCH RBC QN AUTO: 33.7 PG (ref 24–34)
MCHC RBC AUTO-ENTMCNC: 30.3 G/DL (ref 31–37)
MCV RBC AUTO: 111.3 FL (ref 74–97)
P-R INTERVAL, ECG05: 166 MS
P-R INTERVAL, ECG05: 51 MS
PLATELET # BLD AUTO: 167 K/UL (ref 135–420)
PMV BLD AUTO: 9.5 FL (ref 9.2–11.8)
POTASSIUM SERPL-SCNC: 3.8 MMOL/L (ref 3.2–5.1)
Q-T INTERVAL, ECG07: 333 MS
Q-T INTERVAL, ECG07: 364 MS
QRS DURATION, ECG06: 107 MS
QRS DURATION, ECG06: 113 MS
QTC CALCULATION (BEZET), ECG08: 445 MS
QTC CALCULATION (BEZET), ECG08: 451 MS
RBC # BLD AUTO: 3.26 M/UL (ref 4.2–5.3)
SODIUM SERPL-SCNC: 138 MMOL/L (ref 135–145)
TROPONIN I SERPL-MCNC: 0.29 NG/ML (ref 0.02–0.05)
TROPONIN I SERPL-MCNC: 0.35 NG/ML (ref 0.02–0.05)
TROPONIN I SERPL-MCNC: 0.44 NG/ML (ref 0.02–0.05)
VENTRICULAR RATE, ECG03: 107 BPM
VENTRICULAR RATE, ECG03: 92 BPM
WBC # BLD AUTO: 14.2 K/UL (ref 4.6–13.2)

## 2020-10-13 PROCEDURE — 97161 PT EVAL LOW COMPLEX 20 MIN: CPT

## 2020-10-13 PROCEDURE — 97166 OT EVAL MOD COMPLEX 45 MIN: CPT

## 2020-10-13 PROCEDURE — 96376 TX/PRO/DX INJ SAME DRUG ADON: CPT

## 2020-10-13 PROCEDURE — 83735 ASSAY OF MAGNESIUM: CPT

## 2020-10-13 PROCEDURE — 74011250637 HC RX REV CODE- 250/637: Performed by: INTERNAL MEDICINE

## 2020-10-13 PROCEDURE — 74011250637 HC RX REV CODE- 250/637: Performed by: NURSE PRACTITIONER

## 2020-10-13 PROCEDURE — 85027 COMPLETE CBC AUTOMATED: CPT

## 2020-10-13 PROCEDURE — 97530 THERAPEUTIC ACTIVITIES: CPT

## 2020-10-13 PROCEDURE — 80048 BASIC METABOLIC PNL TOTAL CA: CPT

## 2020-10-13 PROCEDURE — 84484 ASSAY OF TROPONIN QUANT: CPT

## 2020-10-13 PROCEDURE — 74011000258 HC RX REV CODE- 258: Performed by: NURSE PRACTITIONER

## 2020-10-13 PROCEDURE — 93306 TTE W/DOPPLER COMPLETE: CPT

## 2020-10-13 PROCEDURE — 36415 COLL VENOUS BLD VENIPUNCTURE: CPT

## 2020-10-13 PROCEDURE — 99218 HC RM OBSERVATION: CPT

## 2020-10-13 PROCEDURE — 74011250636 HC RX REV CODE- 250/636: Performed by: NURSE PRACTITIONER

## 2020-10-13 PROCEDURE — 93005 ELECTROCARDIOGRAM TRACING: CPT

## 2020-10-13 RX ORDER — CETIRIZINE HCL 10 MG
10 TABLET ORAL DAILY
Status: DISCONTINUED | OUTPATIENT
Start: 2020-10-13 | End: 2020-10-15 | Stop reason: HOSPADM

## 2020-10-13 RX ORDER — SODIUM CHLORIDE 0.9 % (FLUSH) 0.9 %
5-40 SYRINGE (ML) INJECTION AS NEEDED
Status: DISCONTINUED | OUTPATIENT
Start: 2020-10-13 | End: 2020-10-15 | Stop reason: HOSPADM

## 2020-10-13 RX ADMIN — APIXABAN 2.5 MG: 2.5 TABLET, FILM COATED ORAL at 08:08

## 2020-10-13 RX ADMIN — BUSPIRONE HYDROCHLORIDE 10 MG: 5 TABLET ORAL at 08:08

## 2020-10-13 RX ADMIN — LEVOTHYROXINE SODIUM 200 MCG: 0.1 TABLET ORAL at 08:08

## 2020-10-13 RX ADMIN — BACLOFEN 10 MG: 10 TABLET ORAL at 22:22

## 2020-10-13 RX ADMIN — BACLOFEN 10 MG: 10 TABLET ORAL at 16:39

## 2020-10-13 RX ADMIN — MAGNESIUM OXIDE TAB 400 MG (241.3 MG ELEMENTAL MG) 400 MG: 400 (241.3 MG) TAB at 08:08

## 2020-10-13 RX ADMIN — LISINOPRIL 10 MG: 5 TABLET ORAL at 08:08

## 2020-10-13 RX ADMIN — Medication 1 TABLET: at 09:04

## 2020-10-13 RX ADMIN — GABAPENTIN 300 MG: 300 CAPSULE ORAL at 16:39

## 2020-10-13 RX ADMIN — ACETAMINOPHEN 650 MG: 325 TABLET, FILM COATED ORAL at 09:04

## 2020-10-13 RX ADMIN — APIXABAN 2.5 MG: 2.5 TABLET, FILM COATED ORAL at 00:26

## 2020-10-13 RX ADMIN — QUETIAPINE FUMARATE 50 MG: 25 TABLET ORAL at 00:26

## 2020-10-13 RX ADMIN — FUROSEMIDE 40 MG: 40 TABLET ORAL at 08:08

## 2020-10-13 RX ADMIN — DIVALPROEX SODIUM 1000 MG: 500 TABLET, EXTENDED RELEASE ORAL at 22:22

## 2020-10-13 RX ADMIN — ACETAMINOPHEN 650 MG: 325 TABLET, FILM COATED ORAL at 22:29

## 2020-10-13 RX ADMIN — CEFTRIAXONE SODIUM 1 G: 1 INJECTION, POWDER, FOR SOLUTION INTRAMUSCULAR; INTRAVENOUS at 08:08

## 2020-10-13 RX ADMIN — QUETIAPINE FUMARATE 50 MG: 25 TABLET ORAL at 22:22

## 2020-10-13 RX ADMIN — GABAPENTIN 300 MG: 300 CAPSULE ORAL at 08:08

## 2020-10-13 RX ADMIN — GABAPENTIN 300 MG: 300 CAPSULE ORAL at 22:22

## 2020-10-13 RX ADMIN — CETIRIZINE HYDROCHLORIDE 10 MG: 10 TABLET, FILM COATED ORAL at 09:00

## 2020-10-13 RX ADMIN — DIVALPROEX SODIUM 1000 MG: 500 TABLET, EXTENDED RELEASE ORAL at 08:08

## 2020-10-13 RX ADMIN — SODIUM CHLORIDE 500 ML: 9 INJECTION, SOLUTION INTRAVENOUS at 08:00

## 2020-10-13 RX ADMIN — BACLOFEN 10 MG: 10 TABLET ORAL at 08:08

## 2020-10-13 RX ADMIN — BUSPIRONE HYDROCHLORIDE 10 MG: 5 TABLET ORAL at 17:02

## 2020-10-13 RX ADMIN — FERROUS SULFATE TAB 325 MG (65 MG ELEMENTAL FE) 325 MG: 325 (65 FE) TAB at 08:08

## 2020-10-13 RX ADMIN — AMIODARONE HYDROCHLORIDE 200 MG: 200 TABLET ORAL at 08:08

## 2020-10-13 RX ADMIN — APIXABAN 2.5 MG: 2.5 TABLET, FILM COATED ORAL at 17:01

## 2020-10-13 NOTE — H&P
HISTORY & PHYSICAL [address] [place] [address]  
 
 
 
NAME: Ciera Mcneil :  1945 MRN:  [id number] Date/Time:  10/12/2020 8:19 PM 
 
Patient PCP: Anant Moeller MD 
 
  
Subjective: CHIEF COMPLAINT: Fatigue, UTI symptoms HISTORY OF PRESENT ILLNESS:    
Joey Wright is a 77 y.o.  female who presents with c/o fatigue, urinary frequency and burning with urination. She reports that she has frequent UTI's and feels as though she likely has one now. She states that she has been weak since waking this morning and unable to ambulate which she is able to normally do with the help of wheelchair. EMS was called and she was found to have an elevated temp of 103. She was brought to the ED and on arrival her temp was 100.1 UA was grossly positive. She received 1g Rocephin in ED. Other labs were unremarkable, however the patient had a slightly elevated troponin of 0.11 which prompted the ED physician to call us. She will be brought in for observation of UTI and elevated troponin. Past Medical History:  
Diagnosis Date  CHF (congestive heart failure) ([address]  Chronic neck and back pain  Frequent UTI  Hypercholesteremia  Hypertension  Obese Past Surgical History:  
Procedure Laterality Date  HX ABOVE KNEE AMPUTATION    
 right  HX CERVICAL FUSION    
 HX CHOLECYSTECTOMY  HX GASTRIC BYPASS  HX HYSTERECTOMY  HX ORTHOPAEDIC    
 neck surgery  HX PARTIAL THYROIDECTOMY Social History Tobacco Use  Smoking status: Never Smoker  Smokeless tobacco: Never Used Substance Use Topics  Alcohol use: Yes Comment: rare History reviewed. No pertinent family history. Allergies Allergen Reactions  Codeine Unknown (comments)  Hydromorphone Unknown (comments) Patient states it makes her not stop talking, jittery  Prednisone Unknown (comments)  Succinylcholine Chloride Unknown (comments) Prior to Admission medications Medication Sig Start Date End Date Taking? Authorizing Provider  
divalproex ER (DEPAKOTE ER) 500 mg ER tablet Take 1,000 mg by mouth every twelve (12) hours. 10/10/19  Yes Mariama Saini MD  
fluticasone propionate (FLONASE) 50 mcg/actuation nasal spray 2 Sprays by Both Nostrils route daily. 1/1/19  Yes Mariama Saini MD  
fexofenadine (Allegra Allergy) 180 mg tablet Take 1 Tab by mouth daily. Mariama Saini MD  
omega-3 fatty acids (Fish Oil Concentrate) 1,000 mg cap Take 1 Tab by mouth daily. Mariama Saini MD  
magnesium oxide (MAG-OX) 400 mg tablet Take 1 Tab by mouth daily. Mariama Saini MD  
b complex-vitamin c-folic acid 0.8 mg (Isabel-Seth) 0.8 mg tab tablet Take 1 Tab by mouth daily. Mariama Saini MD  
diclofenac (Voltaren) 1 % gel Apply 1 Applicator to affected area as needed. Mariama Saini MD  
amiodarone (CORDARONE) 200 mg tablet Take 1 Tab by mouth daily. 8/2/20   Mariama Saini MD  
Eliquis 2.5 mg tablet Take 1 Tab by mouth daily. 9/18/20   Mariama Saini MD  
cranberry extract 425 mg cap Take 425 mg by mouth two (2) times a day. Mariama Saini MD  
Isabel-Seth 0.8 mg tab tablet Take 1 Tab by mouth daily. 9/24/20   Mariama Saini MD  
ibuprofen (MOTRIN) 200 mg tablet Take 400 mg by mouth daily as needed. Mariama Saini MD  
levothyroxine (SYNTHROID) 200 mcg tablet Take 1 Tab by mouth daily. 10/8/20   Mariama Saini MD  
lisinopriL (PRINIVIL, ZESTRIL) 10 mg tablet Take 1 Tab by mouth daily. Mariama Saini MD  
gabapentin (NEURONTIN) 300 mg capsule Take 1 Cap by mouth three (3) times daily. Max Daily Amount: 900 mg. Indications: neuropathic pain 9/30/20   Sang Moeller MD  
ferrous sulfate 325 mg (65 mg iron) tablet TAKE 1 TABLET EVERY DAY 9/30/20   Sang Moeller MD  
furosemide (LASIX) 20 mg tablet Take 2 Tabs by mouth daily.  9/28/20   Sang Moeller MD  
 baclofen (LIORESAL) 10 mg tablet TAKE 1 TABLET THREE TIMES DAILY 9/23/20   Abhijeet Moeller MD  
QUEtiapine (SEROquel) 50 mg tablet TAKE 1 TABLET BY MOUTH EVERYDAY AT BEDTIME 8/18/20   Rivera Farley NP  
busPIRone (BUSPAR) 10 mg tablet TAKE 1 TABLET TWICE DAILY 8/17/20 11/15/20  Lola Warren NP  
 
 
REVIEW OF SYSTEMS:    
I am not able to complete the review of systems because: The patient is intubated and sedated The patient has altered mental status due to his acute medical problems The patient has baseline aphasia from prior stroke(s) The patient has baseline dementia and is not reliable historian The patient is in acute medical distress and unable to provide information Total of 12 systems reviewed as follows:   
   POSITIVE= underlined text  Negative = text not underlined General:  fever, chills, sweats, generalized weakness, weight loss/gain,  
   loss of appetite Eyes:    blurred vision, eye pain, loss of vision, double vision ENT:    rhinorrhea, pharyngitis Respiratory:   cough, sputum production, SOB, [place], wheezing, pleuritic pain  
Cardiology:   chest pain, palpitations, orthopnea, PND, edema, syncope Gastrointestinal:  abdominal pain , N/V, diarrhea, dysphagia, constipation, bleeding Genitourinary:  frequency, urgency, dysuria, hematuria, incontinence Muskuloskeletal :  arthralgia, myalgia, back pain Hematology:  easy bruising, nose or gum bleeding, lymphadenopathy Dermatological: rash, ulceration, pruritis, color change / jaundice Endocrine:   hot flashes or polydipsia Neurological:  headache, dizziness, confusion, focal weakness, paresthesia, Speech difficulties, memory loss, gait difficulty Psychological: Feelings of anxiety, depression, agitation Objective: VITALS:   
Visit Vitals BP (!) 155/93 (BP 1 Location: Left arm, BP Patient Position: At rest) Pulse (!) 105 Temp 100.1 °F (37.8 °C) Resp 19 Ht 5' 8\" (1.727 m) Wt 127 kg (280 lb) SpO2 94% BMI 42.57 kg/m² PHYSICAL EXAM: 
 
General:    Alert, cooperative, no distress, appears stated age. Obese. HEENT: Atraumatic, anicteric sclerae, pink conjunctivae No oral ulcers, mucosa moist, throat clear, dentition fair Neck:  Supple, symmetrical,  thyroid: non tender Lungs:   Clear to auscultation bilaterally. No Wheezing or Rhonchi. No rales. Chest wall:  No tenderness  No Accessory muscle use. Heart:   Regular  rhythm,  No  murmur   No edema Abdomen:   Soft, non-tender. Not distended. Bowel sounds normal 
Extremities: No cyanosis. No clubbing,   
  Skin turgor normal, Capillary refill normal, Radial dial pulse 2+. Right AKA, well healed. Skin:     Not pale. Not Jaundiced  No rashes Psych:  Good insight. Not depressed. Not anxious or agitated. Neurologic: EOMs intact. No facial asymmetry. No aphasia or slurred speech. Sensation grossly intact. Alert and oriented X 4.  
 
 
______________________________________________________________________ Given the patient's current clinical presentation, I have a high level of concern for decompensation if discharged from the emergency department. Complex decision making was performed, which includes reviewing the patient's available past medical records, laboratory results, and x-ray films. My assessment of this patient's clinical condition and my plan of care is as follows. Assessment / Plan: 1. UTI 
-UA grossly positive in ED 
-Urine Culture pending 
-1g Rocephin given in ED, cont daily for a total of 3 doses. -Tylenol prn for fever 2. Elevated troponin 
-patient denies chest pain 
-will trend troponins 
-EKG if not done in ED 
-monitor of tele 
-cardiology c/s 
-monitor and replace electrolytes 3. Generlized weakness 
-likely 2/t UTI 
-patient normally uses powered wheelchair to get around 
-bedresst 
-PT/OT consult 4. H/O afib 
-continue eliquis BID 
-continue amiodarone 5.  HTN 
 -cont lisinopril 
-monitor VS per unit protocol 6. Hypothyroid 
-cont synthroid 7. H/O Bipolar 
-cont depakote, seroquel and buspar 
-cont supportive care Code Status: Full code Surrogate Decision Maker: See chart for details DVT Prophylaxis: Eliquis GI Prophylaxis: not indicated Baseline:   
 
 
_______________________________________________________________________ Care Plan discussed with: 
  Comments Patient x Family RN x Care Manager Consultant:     
_______________________________________________________________________ Expected  Disposition:  
Home with Family x HH/PT/OT/RN   
SNF/LTC   
TIFFANIE   
________________________________________________________________________ TOTAL TIME:  45 Minutes Comments  
 x Reviewed previous records  
>50% of visit spent in counseling and coordination of care x Discussion with patient and/or family and questions answered 
  
 
________________________________________________________________________ Signed: Tamala Schirmer, NP Procedures: see electronic medical records for all procedures/Xrays and details which were not copied into this note but were reviewed prior to creation of Plan. LAB DATA REVIEWED:   
Recent Results (from the past 24 hour(s)) URINALYSIS W/ RFLX MICROSCOPIC Collection Time: 10/12/20 12:30 PM  
Result Value Ref Range Color CIT Group Appearance Cloudy (A) Clear Specific gravity 1.020 1.003 - 1.035    
 pH (UA) 6.0 5.0 - 9.0 Protein 30 (A) Negative mg/dL Glucose Negative Negative mg/dL Ketone Negative Negative mg/dL Bilirubin Negative Negative Blood Large (A) Negative Urobilinogen 0.2 0.2 - 1.0 EU/dL Nitrites Positive (A) Negative Leukocyte Esterase Large (A) Negative URINE MICROSCOPIC Collection Time: 10/12/20 12:30 PM  
Result Value Ref Range WBC  0 - 4 /hpf  
 RBC 5-10 0 - 2 /hpf Epithelial cells Few 0 - 20 /lpf Bacteria 3+ (A) None /hpf  
CBC WITH AUTOMATED DIFF Collection Time: 10/12/20 12:55 PM  
Result Value Ref Range WBC 12.6 4.6 - 13.2 K/uL  
 RBC 3.43 (L) 4.20 - 5.30 M/uL  
 HGB 11.9 (L) 12.0 - 16.0 g/dL HCT 37.7 35.0 - 45.0 % .9 (H) 74.0 - 97.0 FL  
 MCH 34.7 (H) 24.0 - 34.0 PG  
 MCHC 31.6 31.0 - 37.0 g/dL  
 RDW 12.6 11.6 - 14.5 % PLATELET 257 417 - 932 K/uL MPV 10.0 9.2 - 11.8 FL  
 NEUTROPHILS 70 % BAND NEUTROPHILS 10 % LYMPHOCYTES 7 % MONOCYTES 13 % EOSINOPHILS 0 % BASOPHILS 0 % IMMATURE GRANULOCYTES 0 %  
 ABS. NEUTROPHILS 10.1 K/UL  
 ABS. LYMPHOCYTES 0.9 K/UL  
 ABS. MONOCYTES 1.6 K/UL  
 ABS. EOSINOPHILS 0.0 K/UL  
 ABS. BASOPHILS 0.0 K/UL  
 ABS. IMM. GRANS. 0.0 K/UL  
 DF Manual    
 RBC COMMENTS Macrocytosis 1+ TROPONIN I Collection Time: 10/12/20  3:50 PM  
Result Value Ref Range Troponin-I, Qt. 0.11 (H) 0.02 - 0.05 ng/mL METABOLIC PANEL, COMPREHENSIVE Collection Time: 10/12/20  3:50 PM  
Result Value Ref Range Sodium 140 135 - 145 mmol/L Potassium 4.2 3.2 - 5.1 mmol/L Chloride 106 94 - 111 mmol/L  
 CO2 21 21 - 33 mmol/L Anion gap 13 mmol/L Glucose 162 (H) 70 - 110 mg/dL BUN 35 (H) 9 - 21 mg/dL Creatinine 1.30 (H) 0.70 - 1.20 mg/dL BUN/Creatinine ratio 27 GFR est AA 49 ml/min/1.73m2 GFR est non-AA 40 ml/min/1.73m2 Calcium 7.8 (L) 8.5 - 10.5 mg/dL Bilirubin, total 0.5 0.2 - 1.0 mg/dL AST (SGOT) 27 14 - 74 U/L  
 ALT (SGPT) 16 3 - 52 U/L Alk. phosphatase 59 38 - 126 U/L Protein, total 6.1 6.1 - 8.4 g/dL Albumin 2.9 (L) 3.5 - 4.7 g/dL Globulin 3.2 g/dL A-G Ratio 0.9

## 2020-10-13 NOTE — ASSESSMENT & PLAN NOTE
Urine cx pending UA + for Nitrites and Leukocyte esterase Rocephin 1gm daily Increase oral hydration

## 2020-10-13 NOTE — CONSULTS
[facility] CARDIOLOGY CONSULTATION 
 
REASON FOR CONSULT: Elevated troponins REQUESTING PROVIDER: Gema Vaca NP 
 
CHIEF COMPLAINT:  Weakness and dysuria HISTORY OF PRESENT ILLNESS:  Sailaja Junior is a 77y.o. year-old female with past medical history significant for CHF, hypertension, hyperlipidemia, atrial fibrillation, right AKA, hypothyroidism, and bipolar disorder who seen today for evaluation of elevated troponins. The patient presented to the [facility] ER on 10/12/2020 for evaluation of weakness and dysuria. She is being treated for UTI. Troponins have been elevated with trend as follows: 0.11-->0.12-->0.27-->0.35 current at 0330 this morning. She denies any chest pain prior to, or since admission. She reports having some shortness of breath yesterday which has improved with supplemental oxygen. She reports a history of atrial fibrillation but does not see a cardiologist. She is on amiodarone and Eliquis. She reports a history of CHF and states she has had some LLE swelling recently. She was mistakenly only taking 20 mg Lasix at home, but started increasing to 40 mg as needed on days when her LLE is more swollen. Records from hospital admission course thus far reviewed. Telemetry reviewed. No events overnight. The patient is in sinus rhythm. INPATIENT MEDICATIONS:  Home medications reviewed. Current Facility-Administered Medications:  
  sodium chloride (NS) flush 5-40 mL, 5-40 mL, IntraVENous, PRN, Greg Sosa MD 
  cetirizine (ZYRTEC) tablet 10 mg, 10 mg, Oral, DAILY, Shahida Rose MD, 10 mg at 10/13/20 0900 
  sodium chloride (NS) flush 5-40 mL, 5-40 mL, IntraVENous, PRN, Álvaro Panchal NP, 10 mL at 10/12/20 2039   acetaminophen (TYLENOL) tablet 650 mg, 650 mg, Oral, Q6H PRN, 650 mg at 10/13/20 0904 **OR** acetaminophen (TYLENOL) suppository 650 mg, 650 mg, Rectal, Q6H PRN, Álvaro Panchal NP 
   polyethylene glycol (MIRALAX) packet 17 g, 17 g, Oral, DAILY PRN, Panchal, Griffineal Crape, NP 
  ondansetron (ZOFRAN ODT) tablet 4 mg, 4 mg, Oral, Q8H PRN **OR** ondansetron (ZOFRAN) injection 4 mg, 4 mg, IntraVENous, Q6H PRN, Panchal, Griffineal Crape, NP 
  amiodarone (CORDARONE) tablet 200 mg, 200 mg, Oral, DAILY, Panchal, Griffineal Crape, NP, 200 mg at 10/13/20 3454   B complex-vitamin C-folic acid (NEPHRO-RISHABH) 0.8 mg tab, 1 Tab, Oral, DAILY, Griffin Panchaleal Itape, NP, 1 Tab at 10/13/20 0644   baclofen (LIORESAL) tablet 10 mg, 10 mg, Oral, TID, Panchal, Griffineal Crape, NP, 10 mg at 10/13/20 0236   busPIRone (BUSPAR) tablet 10 mg, 10 mg, Oral, BID, Panchal, Griffineal Crape, NP, 10 mg at 10/13/20 8036   divalproex ER (DEPAKOTE ER) 24 hour tablet 1,000 mg, 1,000 mg, Oral, Q12H, Griffin Panchaleal Rupinder, NP, 1,000 mg at 10/13/20 6437   ferrous sulfate tablet 325 mg, 1 Tab, Oral, DAILY WITH BREAKFAST, Panchal, Griffineal Crape, NP, 325 mg at 10/13/20 8949   furosemide (LASIX) tablet 40 mg, 40 mg, Oral, DAILY, PanchalGriffineal Crape, NP, 40 mg at 10/13/20 0808 
  gabapentin (NEURONTIN) capsule 300 mg, 300 mg, Oral, TID, PanchalGriffin mccabeeal Crape, NP, 300 mg at 10/13/20 1353   levothyroxine (SYNTHROID) tablet 200 mcg, 200 mcg, Oral, DAILY, Panchal, Janeal Crape, NP, 200 mcg at 10/13/20 8867   lisinopriL (PRINIVIL, ZESTRIL) tablet 10 mg, 10 mg, Oral, DAILY, Panchal, Janeal Crape, NP, 10 mg at 10/13/20 7694   magnesium oxide (MAG-OX) tablet 400 mg, 400 mg, Oral, DAILY, Raul Panchal NP, 400 mg at 10/13/20 3948   QUEtiapine (SEROquel) tablet 50 mg, 50 mg, Oral, QHS, Raul Panchal, JUAN, 50 mg at 10/13/20 4368   cefTRIAXone (ROCEPHIN) 1 g in 0.9% sodium chloride (MBP/ADV) 50 mL MBP, 1 g, IntraVENous, Q24H, Raul Panchal NP, Last Rate: 100 mL/hr at 10/13/20 0808, 1 g at 10/13/20 4105   apixaban (ELIQUIS) tablet 2.5 mg, 2.5 mg, Oral, BID, Raul Panchal NP, 2.5 mg at 10/13/20 0808 ALLERGIES:  Allergies reviewed with the patient, Allergies Allergen Reactions  Codeine Unknown (comments)  Hydromorphone Unknown (comments) Patient states it makes her not stop talking, jittery  Prednisone Unknown (comments)  Succinylcholine Chloride Unknown (comments) Mando Yarbrough FAMILY HISTORY:  Family history reviewed. Mother reports a history of cardiomyopathy. SOCIAL HISTORY:  Notable for no tobacco use, no alcohol use, and no illicit drug use. REVIEW OF SYSTEMS:  Complete review of systems performed, pertinents noted above, all other systems are negative. PHYSICAL EXAMINATION:  Vital sign assessment reveal a blood pressure of 106/55 and pulse rate of 93. Cardiovascular exam has a heart with a regular rate and rhythm, normal S1 and S2. No murmur present. There are no rubs or gallops. Good peripheral pulses. No jugular venous distension. Respiratory exam reveals clear lung fields, no rales or rhonchi. Lymphatic exam reveals 2+ edema to LLE. She is a right AKA. Neurologic exam is nonfocal.   
 
Recent labs results and imaging reviewed. Notable findings include: 
Recent Results (from the past 24 hour(s)) URINALYSIS W/ RFLX MICROSCOPIC Collection Time: 10/12/20 12:30 PM  
Result Value Ref Range Color CIT Group Appearance Cloudy (A) Clear Specific gravity 1.020 1.003 - 1.035    
 pH (UA) 6.0 5.0 - 9.0 Protein 30 (A) Negative mg/dL Glucose Negative Negative mg/dL Ketone Negative Negative mg/dL Bilirubin Negative Negative Blood Large (A) Negative Urobilinogen 0.2 0.2 - 1.0 EU/dL Nitrites Positive (A) Negative Leukocyte Esterase Large (A) Negative URINE MICROSCOPIC Collection Time: 10/12/20 12:30 PM  
Result Value Ref Range WBC  0 - 4 /hpf  
 RBC 5-10 0 - 2 /hpf Epithelial cells Few 0 - 20 /lpf Bacteria 3+ (A) None /hpf  
CBC WITH AUTOMATED DIFF Collection Time: 10/12/20 12:55 PM  
Result Value Ref Range WBC 12.6 4.6 - 13.2 K/uL  
 RBC 3.43 (L) 4.20 - 5.30 M/uL  
 HGB 11.9 (L) 12.0 - 16.0 g/dL HCT 37.7 35.0 - 45.0 % .9 (H) 74.0 - 97.0 FL  
 MCH 34.7 (H) 24.0 - 34.0 PG  
 MCHC 31.6 31.0 - 37.0 g/dL  
 RDW 12.6 11.6 - 14.5 % PLATELET 884 321 - 527 K/uL MPV 10.0 9.2 - 11.8 FL  
 NEUTROPHILS 70 % BAND NEUTROPHILS 10 % LYMPHOCYTES 7 % MONOCYTES 13 % EOSINOPHILS 0 % BASOPHILS 0 % IMMATURE GRANULOCYTES 0 %  
 ABS. NEUTROPHILS 10.1 K/UL  
 ABS. LYMPHOCYTES 0.9 K/UL  
 ABS. MONOCYTES 1.6 K/UL  
 ABS. EOSINOPHILS 0.0 K/UL  
 ABS. BASOPHILS 0.0 K/UL  
 ABS. IMM. GRANS. 0.0 K/UL  
 DF Manual    
 RBC COMMENTS Macrocytosis 1+ TROPONIN I Collection Time: 10/12/20  3:50 PM  
Result Value Ref Range Troponin-I, Qt. 0.11 (H) 0.02 - 0.05 ng/mL METABOLIC PANEL, COMPREHENSIVE Collection Time: 10/12/20  3:50 PM  
Result Value Ref Range Sodium 140 135 - 145 mmol/L Potassium 4.2 3.2 - 5.1 mmol/L Chloride 106 94 - 111 mmol/L  
 CO2 21 21 - 33 mmol/L Anion gap 13 mmol/L Glucose 162 (H) 70 - 110 mg/dL BUN 35 (H) 9 - 21 mg/dL Creatinine 1.30 (H) 0.70 - 1.20 mg/dL BUN/Creatinine ratio 27 GFR est AA 49 ml/min/1.73m2 GFR est non-AA 40 ml/min/1.73m2 Calcium 7.8 (L) 8.5 - 10.5 mg/dL Bilirubin, total 0.5 0.2 - 1.0 mg/dL AST (SGOT) 27 14 - 74 U/L  
 ALT (SGPT) 16 3 - 52 U/L Alk. phosphatase 59 38 - 126 U/L Protein, total 6.1 6.1 - 8.4 g/dL Albumin 2.9 (L) 3.5 - 4.7 g/dL Globulin 3.2 g/dL A-G Ratio 0.9    
TROPONIN I Collection Time: 10/12/20  4:37 PM  
Result Value Ref Range Troponin-I, Qt. 0.12 (H) 0.02 - 0.05 ng/mL METABOLIC PANEL, COMPREHENSIVE Collection Time: 10/12/20  4:37 PM  
Result Value Ref Range Sodium 139 135 - 145 mmol/L Potassium 4.7 3.2 - 5.1 mmol/L Chloride 103 94 - 111 mmol/L  
 CO2 22 21 - 33 mmol/L Anion gap 14 mmol/L Glucose 166 (H) 70 - 110 mg/dL BUN 35 (H) 9 - 21 mg/dL Creatinine 1.29 (H) 0.70 - 1.20 mg/dL BUN/Creatinine ratio 27 GFR est AA 49 ml/min/1.73m2 GFR est non-AA 40 ml/min/1.73m2 Calcium 8.5 8.5 - 10.5 mg/dL Bilirubin, total 0.4 0.2 - 1.0 mg/dL AST (SGOT) 25 14 - 74 U/L  
 ALT (SGPT) 15 3 - 52 U/L Alk. phosphatase 65 38 - 126 U/L Protein, total 6.9 6.1 - 8.4 g/dL Albumin 3.3 (L) 3.5 - 4.7 g/dL Globulin 3.6 g/dL A-G Ratio 0.9 EKG, 12 LEAD, INITIAL Collection Time: 10/12/20  7:11 PM  
Result Value Ref Range Ventricular Rate 107 BPM  
 Atrial Rate 113 BPM  
 P-R Interval 51 ms QRS Duration 113 ms  
 Q-T Interval 333 ms QTC Calculation (Bezet) 445 ms Calculated P Axis 72 degrees Calculated R Axis -38 degrees Calculated T Axis 95 degrees Diagnosis Sinus tachycardia with irregular rate Incomplete left bundle branch block Baseline wander in lead(s) II,aVR 
repeat ekg and consider A. FIB. Confirmed by Yane Valentin ([id number]) on 10/13/2020 5:34:04 AM 
  
TROPONIN I Collection Time: 10/12/20  9:58 PM  
Result Value Ref Range Troponin-I, Qt. 0.27 (H) 0.02 - 0.05 ng/mL CBC W/O DIFF Collection Time: 10/13/20  3:34 AM  
Result Value Ref Range WBC 14.2 (H) 4.6 - 13.2 K/uL  
 RBC 3.26 (L) 4.20 - 5.30 M/uL  
 HGB 11.0 (L) 12.0 - 16.0 g/dL HCT 36.3 35.0 - 45.0 % .3 (H) 74.0 - 97.0 FL  
 MCH 33.7 24.0 - 34.0 PG  
 MCHC 30.3 (L) 31.0 - 37.0 g/dL  
 RDW 12.6 11.6 - 14.5 % PLATELET 606 651 - 469 K/uL MPV 9.5 9.2 - 11.8 FL  
TROPONIN I Collection Time: 10/13/20  3:34 AM  
Result Value Ref Range Troponin-I, Qt. 0.35 (H) 0.02 - 0.05 ng/mL EKG, 12 LEAD, SUBSEQUENT Collection Time: 10/13/20  9:26 AM  
Result Value Ref Range Ventricular Rate 92 BPM  
 Atrial Rate 93 BPM  
 P-R Interval 166 ms  
 QRS Duration 107 ms Q-T Interval 364 ms QTC Calculation (Bezet) 451 ms Calculated P Axis 76 degrees Calculated R Axis -36 degrees Calculated T Axis 108 degrees Diagnosis Sinus rhythm Left axis deviation Abnormal R-wave progression, late transition Borderline repolarization abnormality TROPONIN I Collection Time: 10/13/20 10:20 AM  
Result Value Ref Range Troponin-I, Qt. 0.44 (H) 0.02 - 0.05 ng/mL MAGNESIUM Collection Time: 10/13/20 10:20 AM  
Result Value Ref Range Magnesium 1.9 1.7 - 2.8 mg/dL Discussed case with Dr. Krista Knapp, and our impression and recommendations are as follows: 1. Elevated troponins: Troponins are elevated, but non-ACS range as follows: 0.11-->0.12-->0.27-->0.35-->0.44 currently. Troponin elevation is due to infectious process from UTI. Will obtain an echocardiogram to assess overall cardiac structure and function. Blood pressures are soft; will re-evaluate tomorrow and consider adding metoprolol at that time if BPs are better. The patient will need outpatient ischemic evaluation. 2.  Atrial fibrillation: She is currently in sinus rhythm and rate is controlled. Continue amiodarone. Continue Eliquis for anticoagulation. Continue magnesium supplementation. Continue telemetry monitoring. Keep serum potassium between 4-5 and serum magnesium > 2.  
 
3.  Hypertension: Blood pressures are low-normal. Monitor closely. 4.  History of CHF: No rales on exam but edema noted to LLE. Will continue PO Lasix as prescribed. Echocardiogram is pending, as above. Monitor volume status closely. Thank you for involving us in the care of this patient. Please do not hesitate to call me or Dr. Krista Knapp if additional questions arise.

## 2020-10-13 NOTE — PROGRESS NOTES
EMERGENCY DEPARTMENT ENCOUNTER      This patient was seen and evaluated by the attending physician. Pt Name: Sariah Courtney  MRN: [id number]  Nehemiahgfger 1948  Date of evaluation: 6/28/2020  Provider: SUSHMA Medrano - CNP-C  PCP: Julian Partida MD  ED Attending: Dr. Loni Valenzuela    History provided by the patient. CHIEF COMPLAINT:     Chief Complaint   Patient presents with    AICD Problem     pt was shocked one time today while at [facility]. pt denies palpitations/symptoms prior to being shocked. pt A&O x 4 on arrival. denies chest pain. c/o \"feeling weak\"        HISTORY OF PRESENT ILLNESS:      Sariah Courtney is a 71 y.o. male who presents [address] [facility]  ED with complaints of his defibrillator firing. Patient states that he felt fine today, went to [facility], states that he received 1 shock from his defibrillator at [facility]. Patient states that afterwards he did feel a little weak, states that he did get a headache but currently denies any symptoms states that he feels pretty good. Denies any diaphoresis, no shortness of breath, no other injuries or complaints at this time. Location:-  Quality:-  Severity:-  Duration:-  Modifying factors:-    Nursing Notes were reviewed     REVIEW OF SYSTEMS:     Review of Systems  All systems, atotal of 10, are reviewed and negative except for those that were just noted in history present illness.         PAST MEDICAL HISTORY:     Past Medical History:   Diagnosis Date    AICD (automatic cardioverter/defibrillator) present 02/10/2015    pacemaker/defibrillator    Arthritis     CAD (coronary artery disease)     CHF (congestive heart failure) (HCC)     Chronic systolic CHF (congestive heart failure), NYHA class 3 (HCC)     GERD (gastroesophageal reflux disease)     Hearing loss     left    Hyperlipidemia     MI (myocardial infarction) ([facility] [address]     Rash          SURGICAL HISTORY:      Past Surgical History:   Procedure Laterality Date    Late Entry: Intermittent periods of Afib wit RVR -150's, via continuous monitoring, accompanied with with hypotension. Updated Hospitalist covering, Marcelo Pagan, NP.  IVF bolus 500 ml NS, followed by Cardizem IVP with sustained  Elevated HR and normotensive BP. Monitoring continues. EMR entered 
reviewed by   for the purpose of chart review in the course of functions and responsibilities related to performance improvement CARDIAC DEFIBRILLATOR PLACEMENT      CARDIAC DEFIBRILLATOR PLACEMENT      CATARACT REMOVAL WITH IMPLANT Right 02/01/2017    CATARACT REMOVAL WITH IMPLANT Left 03/01/2017    COLONOSCOPY      CORONARY ANGIOPLASTY WITH STENT PLACEMENT      EYE SURGERY      KNEE SURGERY           CURRENT MEDICATIONS:       Previous Medications    ASPIRIN 81 MG TABLET    Take 81 mg by mouth daily. CARVEDILOL (COREG) 3.125 MG TABLET    Take 1 tablet by mouth 2 times daily (with meals)    CLOPIDOGREL (PLAVIX) 75 MG TABLET    Take 1 tablet by mouth daily    FINASTERIDE (PROSCAR) 5 MG TABLET    Take 5 mg by mouth daily Indications: Rx by Dr. Garcia [place] ACID (FOLVITE) 1 MG TABLET    Take 1 mg by mouth daily    GEMFIBROZIL (LOPID) 600 MG TABLET    Take 1 tablet by mouth 2 times daily (before meals)    IPRATROPIUM-ALBUTEROL (DUONEB) 0.5-2.5 (3) MG/3ML SOLN NEBULIZER SOLUTION    Inhale 3 mLs into the lungs every 6 hours as needed for Shortness of Breath    LISINOPRIL (PRINIVIL;ZESTRIL) 5 MG TABLET    Take 1 tablet by mouth daily    METHOTREXATE (RHEUMATREX) 2.5 MG CHEMO TABLET    Take 1 tablet by mouth once a week RX directions are 4 tablets weekly. Patient takes one tablet Monday/Wednesday/Friday. NITROGLYCERIN (NITROSTAT) 0.4 MG SL TABLET    Place 1 tablet under the tongue every 5 minutes as needed for Chest pain    OMEPRAZOLE (PRILOSEC) 20 MG DELAYED RELEASE CAPSULE    Take 1 capsule by mouth every morning (before breakfast)    ROSUVASTATIN (CRESTOR) 40 MG TABLET    Take 1 tablet by mouth every evening    TAMSULOSIN (FLOMAX) 0.4 MG CAPSULE    Take 0.4 mg by mouth daily         ALLERGIES:    Patient has no known allergies.     FAMILY HISTORY:       Family History   Problem Relation Age of Onset    Diabetes Mother     Cancer Father           SOCIAL HISTORY:       Social History     Socioeconomic History    Marital status:      Spouse name: None    Number of children: None    Years of education: None    Highest grossly intact. Anterior chambers clear. NECK: Supple. Normal ROM. No meningismus. No thyroid tenderness or swelling noted. CARDIOVASCULAR: RRR. No Murmer. PULMONARY/CHEST WALL: Effort normal. No tachypnea. Lungs clear to ausculation. ABDOMEN: Normal BS. Soft. Nondistended. No tenderness to palpate. No guarding. No hernias noted. No splenomegaly. Back: Spine is midline. No ecchymosis. No crepitus on palpation. No obvious subluxation of vertebral column. No saddle anesthesia or evidence of cauda equina. /ANORECTAL: Not assessed  MUSKULOSKELETAL: Normal ROM. No acute deformities. No edema. No tenderness to palpate. SKIN: Warm and dry. NEUROLOGICAL:  GCS 15. CN II-XII grossly intact. Strength is 5/5 in allextremities and sensation is intact. PSYCHIATRIC: Normal affect, normal insight and judgement. Alert andoriented x 3.         DIAGNOSTIC RESULTS:     LABS:    Results for orders placed or performed during the hospital encounter of 06/28/20   CBC Auto Differential   Result Value Ref Range    WBC 8.3 4.0 - 11.0 K/uL    RBC 3.97 (L) 4.20 - 5.90 M/uL    Hemoglobin 13.4 (L) 13.5 - 17.5 g/dL    Hematocrit 39.4 (L) 40.5 - 52.5 %    MCV 99.1 80.0 - 100.0 fL    MCH 33.8 26.0 - 34.0 pg    MCHC 34.1 31.0 - 36.0 g/dL    RDW 14.6 12.4 - 15.4 %    Platelets 655 129 - 869 K/uL    MPV 7.8 5.0 - 10.5 fL    Neutrophils % 73.3 %    Lymphocytes % 15.5 %    Monocytes % 7.7 %    Eosinophils % 2.8 %    Basophils % 0.7 %    Neutrophils Absolute 6.1 1.7 - 7.7 K/uL    Lymphocytes Absolute 1.3 1.0 - 5.1 K/uL    Monocytes Absolute 0.6 0.0 - 1.3 K/uL    Eosinophils Absolute 0.2 0.0 - 0.6 K/uL    Basophils Absolute 0.1 0.0 - 0.2 K/uL   Comprehensive Metabolic Panel   Result Value Ref Range    Sodium 133 (L) 136 - 145 mmol/L    Potassium 4.1 3.5 - 5.1 mmol/L    Chloride 99 99 - 110 mmol/L    CO2 23 21 - 32 mmol/L    Anion Gap 11 3 - 16    Glucose 107 (H) 70 - 99 mg/dL    BUN 22 (H) 7 - 20 mg/dL    CREATININE 1.1 0.8 - 1.3 mg/dL    GFR Non- >60 >60    GFR African American >60 >60    Calcium 9.7 8.3 - 10.6 mg/dL    Total Protein 7.4 6.4 - 8.2 g/dL    Alb 4.7 3.4 - 5.0 g/dL    Albumin/Globulin Ratio 1.7 1.1 - 2.2    Total Bilirubin 0.5 0.0 - 1.0 mg/dL    Alkaline Phosphatase 110 40 - 129 U/L    ALT 14 10 - 40 U/L    AST 20 15 - 37 U/L    Globulin 2.7 g/dL   Troponin   Result Value Ref Range    Troponin <0.01 <0.01 ng/mL   EKG 12 Lead   Result Value Ref Range    Ventricular Rate 66 BPM    Atrial Rate 66 BPM    P-R Interval 192 ms    QRS Duration 136 ms    Q-T Interval 390 ms    QTc Calculation (Bazett) 408 ms    P Axis 14 degrees    R Axis -44 degrees    T Axis 54 degrees    Diagnosis       Normal sinus rhythm with 1st degree A-V blockLeft axis deviationNon-specific intra-ventricular conduction blockNonspecific ST abnormalityAbnormal ECGWhen compared with ECG of 02-MAR-2019 22:45,Vent. rate has decreased BY  35 BPMQRS duration has increasedConfirmed by Lissy Rodriguez MD, Orie Bio ([id number]) on 6/28/2020 2:13:36 PM         RADIOLOGY:  All x-ray studies are viewed/reviewedby me. Formal interpretations per the radiologist are as follows: No orders to display           EKG:  See EKG interpretation by an attending phsyician      PROCEDURES:   N/A    CRITICAL CARE TIME:   N/A    CONSULTS:  IP CONSULT TO CARDIOLOGY  IP CONSULT TO HOSPITALIST      EMERGENCYDEPARTMENT COURSE and DIFFERENTIAL DIAGNOSIS/MDM:   Vitals:    Vitals:    06/28/20 1337 06/28/20 1445 06/28/20 1515   BP: 130/80 110/70 107/66   Pulse: 70 66 60   Resp: 20 18 16   Temp: 98 °F (36.7 °C)     TempSrc: Oral     SpO2: 98% 96% 99%   Weight: 195 lb (88.5 kg)     Height: 5' 10\" (1.778 m)         Patient was given the following medications:  Medications - No data to display      Patient was evaluated by both myself and Dr. Fabian Sanchez. Patient presented to the emergency department today with complaints of his defibrillator firing earlier today.   Patient currently with no complaints, physical assessment unremarkable. Labs unremarkable. I did discuss the case with Dr. Elizabeth Lopes in cardiology, he recommended that the patient be admitted to the hospital for further evaluation and treatment. I discussed this with the patient, patient agrees with this plan, patient was evaluated by the attending physician who also was in agreement. Hospitalist agreed with admission. Patient admitted in stable condition. Patient laboratory studies, radiographic imaging, andassessment were all discussed with the patient and/or patient family. There was shared decision-making between myself, the attending physician, as well as the patient and/or their surrogate and we are all in agreement with discharge home. There was an opportunity for questions and all questions were answered to the best of my ability and to the satisfaction of the patient and/or patient family. FINAL IMPRESSION:      1. AICD discharge          DISPOSITION/PLAN:   DISPOSITIONDecision To Admit      PATIENT REFERRED TO:  No follow-up provider specified.     DISCHARGE MEDICATIONS:  New Prescriptions    No medications on file                  (Please note that portions of this note were completed with a voice recognition program.  Efforts were made to edit the dictations, but occasionally words are mis-transcribed.)    SUSHMA Velazquez CNP-C (electronically signed)        SUSHMA Velazquez CNP  06/28/20 [id number]

## 2020-10-13 NOTE — PROGRESS NOTES
Verbal (telephone) report given to Sheyla Villareal RN by Rafy Unger RN. Patient transferred via stretcher. Patient changed of incontinence and PureWick in place and connected to suction. Administered PRN acetaminophen for fever of 102. 1. Patient visibly shaking and claims of body chills. Patient CBWR.

## 2020-10-13 NOTE — PROGRESS NOTES
Problem: Mobility Impaired (Adult and Pediatric) Goal: *Acute Goals and Plan of Care (Insert Text) Description: Physical Therapy Goals Initiated 10/13/2020 and to be accomplished within 7 day(s) 1. Patient will move from supine to sit and sit to supine , scoot up and down, and roll side to side in bed with minimal assistance/contact guard assist.   
2.  Patient will transfer from bed to chair and chair to bed with moderate assistance  using the least restrictive device. 3.  Patient will perform sit to stand with minimal assistance/contact guard assist. 
4.  Patient will ambulate with minimal assistance/contact guard assist for 20 feet with the least restrictive device. PLOF: Pt was non-ambulatory and using the power wheelchair for all mobility Outcome: Progressing Towards Goal 
 PHYSICAL THERAPY EVALUATION Patient: Suresh Nieves (45 y.o. female) Date: 10/13/2020 Primary Diagnosis: Elevated troponin [R77.8] UTI (urinary tract infection) [N39.0] Elevated troponin [R77.8] UTI (urinary tract infection) [N39.0] Precautions: N/A 
 
ASSESSMENT : 
Pt is currently on 2 liters of oxygen and requires mod assist to go from supine to sit. She has difficulty performing weight bearing activities due to her R transfemoral amputation. She was able to perform a sit to stand with mod assist and an elevated bed; however, she was only able to stand for a short duration of time due to reduced LE strength and balance. She does report some pain in her R shoulder as a result of a supposed rotator cuff tear. SpO2 values were 93% in supine w/o O2. Patient's rehabilitation potential is considered to be Good Factors which may influence rehabilitation potential include:  
[]         None noted 
[]         Mental ability/status []         Medical condition 
[]         Home/family situation and support systems 
[]         Safety awareness 
[]         Pain tolerance/management [x]         Other: R transfemoral amputation PLAN : 
Recommendations and Planned Interventions:  
[x]           Bed Mobility Training             []    Neuromuscular Re-Education 
[x]           Transfer Training                   []    Orthotic/Prosthetic Training 
[x]           Gait Training                          []    Modalities [x]           Therapeutic Exercises           []    Edema Management/Control 
[x]           Therapeutic Activities            []    Family Training/Education 
[x]           Patient Education 
[]           Other (comment): Frequency/Duration: Patient will be followed by physical therapy daily to address goals. Discharge Recommendations: Home Health Further Equipment Recommendations for Discharge: None SUBJECTIVE:  
Patient stated I want to use a walker but no one .  OBJECTIVE DATA SUMMARY:  
 
Past Medical History:  
Diagnosis Date  CHF (congestive heart failure) ([facility] [address]  Chronic neck and back pain  Frequent UTI  Hypercholesteremia  Hypertension  Obese Past Surgical History:  
Procedure Laterality Date  HX ABOVE KNEE AMPUTATION    
 right  HX CERVICAL FUSION    
 HX CHOLECYSTECTOMY  HX GASTRIC BYPASS  HX HYSTERECTOMY  HX ORTHOPAEDIC    
 neck surgery  HX PARTIAL THYROIDECTOMY Barriers to Learning/Limitations: yes;  physical 
Compensate with: Visual Cues, Verbal Cues, and Tactile Cues Home Situation: 
Home Situation Home Environment: Private residence Wheelchair Ramp: Yes One/Two Story Residence: One story Living Alone: No 
Support Systems: Family member(s) Patient Expects to be Discharged to[de-identified] Private residence Current DME Used/Available at Home: Wheelchair, power Critical Behavior: 
Neurologic State: Alert; Appropriate for age Orientation Level: Appropriate for age;Oriented X4 Cognition: Memory loss Psychosocial 
Patient Behaviors: Cooperative Skin Condition/Temp: Dry Skin Integrity: Intact Skin Integumentary Skin Color: Ecchymosis (comment) Skin Condition/Temp: Dry Skin Integrity: Intact Strength:   
Strength: Generally decreased, functional 
 RUE: 3/5 LUE: 3/5 RLE: Transfemoral amputation LLE: 3/5 Tone & Sensation:  
 Sensation: Intact Range Of Motion:  
AROM: Generally decreased, functional 
 RUE: WFL 
LUE: WFL  
RLE:  
LLE:WFL Posture: 
Posture (WDL): Within defined limits Bed Mobility: 
Rolling: Minimum assistance Supine to Sit: Moderate assistance Sit to Supine: Moderate assistance Scooting: Moderate assistance Transfers: 
Sit to Stand: Moderate assistance Stand to Sit: Minimum assistance Balance:  
Sitting: With support Standing: Impaired Ambulation/Gait Training: 
Gait Description [facility]): (Pt unable to tolerate standing ) Today's Tx:  
Pt had difficulty with all bed mobility, but was able to perform a sit to stand with her L foot on a step and with the bed elevated. Pain: 
Pt reports moderate pain in her R shoulder. Activity Tolerance:  
Pt tolerated activity with minimal complaints. Please refer to the flowsheet for vital signs taken during this treatment. After treatment:  
[]         Patient left in no apparent distress sitting up in chair 
[x]         Patient left in no apparent distress in bed 
[x]         Call bell left within reach 
[]         Nursing notified 
[]         Caregiver present 
[]         Bed alarm activated 
[]         SCDs applied COMMUNICATION/EDUCATION:  
[]         Role of Physical Therapy in the acute care setting. []         Fall prevention education was provided and the patient/caregiver indicated understanding. []         Patient/family have participated as able in goal setting and plan of care. [x]         Patient/family agree to work toward stated goals and plan of care. []         Patient understands intent and goals of therapy, but is neutral about his/her participation. []         Patient is unable to participate in goal setting/plan of care: ongoing with therapy staff. 
[]         Other: Thank you for this referral. 
Lily Brown, SPT Carrie Santana, PT, DPT Time Calculation: 34 mins

## 2020-10-13 NOTE — PROGRESS NOTES
EMR entered and reviewed  for the purpose of chart review in the course of functions and responsibilities related to performance improvement

## 2020-10-13 NOTE — PROGRESS NOTES
Nutrition Assessment Type and Reason for Visit: Initial 
 
Nutrition Recommendations/Plan: continue cardiac diet Nutrition Assessment:  78 yo female PMH: Right AKA, CHF, HTN, HLD, obesity, frequent UTI Pt admitted due to UTI was having ultrasound during visit. Pt with hx of CHF as well some LLE edema +2 but pt also reports some wt gain due to eating more since she was started on a new medication which pt cannot recall. Pt with Right AKA so physical activity is limited. Pt morbidly obese with BMI 42.3 BMP:  
Lab Results Component Value Date/Time  10/13/2020 03:34 AM  
 K 3.8 10/13/2020 03:34 AM  
  10/13/2020 03:34 AM  
 CO2 27 10/13/2020 03:34 AM  
 AGAP 8 10/13/2020 03:34 AM  
  (H) 10/13/2020 03:34 AM  
 BUN 32 (H) 10/13/2020 03:34 AM  
 CREA 1.10 10/13/2020 03:34 AM  
 GFRAA 59 10/13/2020 03:34 AM  
 GFRNA 49 10/13/2020 03:34 AM  
  
 
Malnutrition Assessment: 
Malnutrition Status: No malnutrition Estimated Daily Nutrient Needs: using ABW of 80 kg Energy (kcal):  4572-7755 kcal/day Protein (g):  80-96 g/day Fluid (ml/day):  2708-0234 mL/day Nutrition Related Findings:  Pt admitted due to UTI. Pt with some LLE edema. Pt reports some fluid wt gain but also reports she was strarted on a medication that makes her eat. Pt with hx of obesity and CHF Current Nutrition Therapies: DIET CARDIAC Regular Anthropometric Measures: 
· Height:  5' 8\" (172.7 cm) · Current Body Wt:  127 kg (280 lb) · BMI: 42.6 Nutrition Diagnosis:  
· Overweight/obesity related to other (specify), excessive energy intake(decreased activity) as evidenced by BMI(BMI 42.6) Nutrition Intervention: 
Food and/or Nutrient Delivery: Continue current diet Nutrition Education and Counseling: No recommendations at this time(Pt having ultrasound at time of visit) Coordination of Nutrition Care: Continued inpatient monitoring Goals: Pt will eat > 75% of meals, gradual wt loss encouraged to meet BMI < 30    
 
Nutrition Monitoring and Evaluation:  
Behavioral-Environmental Outcomes: Readiness for change Food/Nutrient Intake Outcomes: Food and nutrient intake Physical Signs/Symptoms Outcomes: Meal time behavior, Nutrition focused physical findings, Weight  
 
F/U: 10/16/2020 Discharge Planning:   
Continue current diet Electronically signed by Amina Wright on 10/13/2020 at 4:08 PM 
 
Contact Number:  
[phone]

## 2020-10-13 NOTE — ASSESSMENT & PLAN NOTE
Pt denies any chest pain. Trops 0.11 on admission Last Trop 0.09 Echo ordered results pending Cardiology consulted Eliquis daily for AFib

## 2020-10-13 NOTE — PROGRESS NOTES
Progress Note Patient: Roopa Teran MRN: [id number] YOB: 1945  Age: 77 y.o. Sex: female Admit Date: 10/12/2020 LOS: 0 days 77year old  female with right AKA seen for UTI. On assessment patient was awake in bed, alert and oriented X3. No signs of distress, discomfort, or pain. Blood pressure on the lower side 95/69 but will continue to monitor otherwise VSS. Pt is resting on 2L NC satting >92%. Troponins noted to be elevated at 0.44 which is an increased from 0.35 and cardiology notified. Echo ordered. Discussed plan of care with patient and patient agrees with all questions answered. Subjective:  
 
- CONSTITUTIONAL: Denies  fatigue, weight loss, fever and chills. - HEENT: Denies changes in vision and hearing. 
 
- RESPIRATORY: Denies SOB and cough. - CV: Denies palpitations and CP.  
 
- GI: Denies abdominal pain, nausea, vomiting, diarrhea and constipation. 
 
- : Reports dysuria and urinary frequency. - MSK:  Right AKA, Denies myalgia and joint pain. - SKIN: Denies rash, burning sensation or  pruritus. 
 
- NEUROLOGICAL:  Denies dizziness, weakness, headache and syncope. - PSYCHIATRIC: Denies recent changes in mood. Denies anxiety and depression. Objective:  
 
Vitals:  
 10/13/20 0420 10/13/20 0800 10/13/20 0852 10/13/20 1200 BP: (!) 106/55  95/69 Pulse: 93 (!) 109 (!) 108 96 Resp: 20  18 Temp: 98 °F (36.7 °C)  98.1 °F (36.7 °C) SpO2: 98% Weight:      
Height:      
  
 
Intake and Output: 
Current Shift: No intake/output data recorded. Last three shifts: 10/11 1901 - 10/13 0700 In: 1020 [P.O.:470; I.V.:550] Out: 600 [Urine:600] Physical Exam:  
- GENERAL: Alert and oriented x 3. No acute distress. Well-nourished.   
 
- HEENT: EOMI. Anicteric. PERRLA,Moist mucous membranes. No scleral icterus. No cervical lymphadenopathy. Oropharynx moist without any lesions -NECK: Supple, no tracheal deviation, no JVD, no significant lymphadenopathy, no thyromegaly noted. - LUNGS: Clear to auscultation bilaterally. No accessory muscle use. Chest symmetrical, No wheezing, rales, rhonchi noted. Appropriate respiratory effort.  
 
- CARDIOVASCULAR: Regular rate and rhythm. No murmur, rubs, gallops, No edema appreciated. S1 & S2 audible. - ABDOMEN: Obese, Soft, non-tender and non-distended. No palpable masses. , lesions, hepatomegaly. Bowels active X4 quadrants. - SKIN: Mild bruising to upper extremities. Warm, dry, intact, no  lesions, or rashes noted. Color appropriate for ethnicity.  
 
- MUSCULOSKELETAL: Moves independently, right AKA Full ROM  
 
- NEUROLOGIC: Alert & Oriented X3. No focal neurological deficits. CN II-XII grossly intact, Muscle strength 5/5, both U & L left DTR in lower extremity 2+. - PSYCHIATRIC: Calm & Cooperative. Appropriate mood and affect. Lab/Data Review: 
Recent Results (from the past 12 hour(s)) METABOLIC PANEL, BASIC Collection Time: 10/13/20  3:34 AM  
Result Value Ref Range Sodium 138 135 - 145 mmol/L Potassium 3.8 3.2 - 5.1 mmol/L Chloride 103 94 - 111 mmol/L  
 CO2 27 21 - 33 mmol/L Anion gap 8 mmol/L Glucose 122 (H) 70 - 110 mg/dL BUN 32 (H) 9 - 21 mg/dL Creatinine 1.10 0.70 - 1.20 mg/dL BUN/Creatinine ratio 29 GFR est AA 59 ml/min/1.73m2 GFR est non-AA 49 ml/min/1.73m2 Calcium 8.1 (L) 8.5 - 10.5 mg/dL MAGNESIUM Collection Time: 10/13/20  3:34 AM  
Result Value Ref Range Magnesium 1.9 1.7 - 2.8 mg/dL CBC W/O DIFF Collection Time: 10/13/20  3:34 AM  
Result Value Ref Range WBC 14.2 (H) 4.6 - 13.2 K/uL  
 RBC 3.26 (L) 4.20 - 5.30 M/uL  
 HGB 11.0 (L) 12.0 - 16.0 g/dL HCT 36.3 35.0 - 45.0 % .3 (H) 74.0 - 97.0 FL  
 MCH 33.7 24.0 - 34.0 PG  
 MCHC 30.3 (L) 31.0 - 37.0 g/dL  
 RDW 12.6 11.6 - 14.5 % PLATELET 634 913 - 669 K/uL  MPV 9.5 9.2 - 11.8 FL  
TROPONIN I  
 Collection Time: 10/13/20  3:34 AM  
Result Value Ref Range Troponin-I, Qt. 0.35 (H) 0.02 - 0.05 ng/mL EKG, 12 LEAD, SUBSEQUENT Collection Time: 10/13/20  9:26 AM  
Result Value Ref Range Ventricular Rate 92 BPM  
 Atrial Rate 93 BPM  
 P-R Interval 166 ms  
 QRS Duration 107 ms Q-T Interval 364 ms QTC Calculation (Bezet) 451 ms Calculated P Axis 76 degrees Calculated R Axis -36 degrees Calculated T Axis 108 degrees Diagnosis Sinus rhythm Left axis deviation Abnormal R-wave progression, late transition Borderline repolarization abnormality TROPONIN I Collection Time: 10/13/20 10:20 AM  
Result Value Ref Range Troponin-I, Qt. 0.44 (H) 0.02 - 0.05 ng/mL MAGNESIUM Collection Time: 10/13/20 10:20 AM  
Result Value Ref Range Magnesium 1.9 1.7 - 2.8 mg/dL Assessment/Plan:  
 
Elevated troponin Trops 0.11 on admission Afib with RVR overnight Trops currently 0.44 Echo ordered Cardiology consulted Eliquis daily for AFib 
 
UTI (urinary tract infection) Urine cx pending UA + for Nitrites and Leukocyte esterase Rocephin 1gm daily Increase oral hydration Signed By: Terence Clark NP October 13, 2020

## 2020-10-14 LAB
ANION GAP SERPL CALC-SCNC: 6 MMOL/L
ATRIAL RATE: 112 BPM
BUN SERPL-MCNC: 32 MG/DL (ref 9–21)
BUN/CREAT SERPL: 32
CA-I BLD-MCNC: 8 MG/DL (ref 8.5–10.5)
CALCULATED P AXIS, ECG09: 52 DEGREES
CALCULATED R AXIS, ECG10: -30 DEGREES
CALCULATED T AXIS, ECG11: 114 DEGREES
CHLORIDE SERPL-SCNC: 104 MMOL/L (ref 94–111)
CO2 SERPL-SCNC: 26 MMOL/L (ref 21–33)
CREAT SERPL-MCNC: 1 MG/DL (ref 0.7–1.2)
DIAGNOSIS, 93000: NORMAL
ECHO AO ROOT DIAM: 3 CM
ECHO AV AREA PEAK VELOCITY: 2.66 CM2
ECHO AV AREA PLAN/BSA: 1.38
ECHO AV AREA VTI: 2.89 CM2
ECHO AV AREA/BSA PEAK VELOCITY: 1.1 CM2/M2
ECHO AV AREA/BSA VTI: 1.2 CM2/M2
ECHO AV CUSP MM: 2 CM
ECHO AV MEAN GRADIENT: 4 MMHG
ECHO AV PEAK GRADIENT: 9 MMHG
ECHO AV VTI: 22.6 CM
ECHO EST RA PRESSURE: 3 MMHG
ECHO LA AREA 2C: 20.2 CM2
ECHO LA AREA 4C: 21 CM2
ECHO LA MAJOR AXIS: 5.25 CM
ECHO LA MINOR AXIS: 2.23 CM
ECHO LA TO AORTIC ROOT RATIO: 1.53
ECHO LV E' LATERAL VELOCITY: 13.7 CM/S
ECHO LV EJECTION FRACTION BIPLANE: 66.1 % (ref 55–100)
ECHO LV INTERNAL DIMENSION DIASTOLIC: 5.2 CM (ref 3.9–5.3)
ECHO LV INTERNAL DIMENSION SYSTOLIC: 3.3 CM
ECHO LV IVSD: 1 CM (ref 0.6–0.9)
ECHO LV MASS 2D: 220.8 G (ref 67–162)
ECHO LV MASS INDEX 2D: 93.6 G/M2 (ref 43–95)
ECHO LV POSTERIOR WALL DIASTOLIC: 1.2 CM (ref 0.6–0.9)
ECHO LVOT DIAM: 2 CM
ECHO LVOT PEAK GRADIENT: 7 MMHG
ECHO LVOT VTI: 20.8 CM
ECHO LVOT VTI: 20.8 CM
ECHO MV A VELOCITY: 92.4 CM/S
ECHO MV AREA PHT: 4 CM2
ECHO MV E DECELERATION TIME (DT): 0.19 S
ECHO MV E VELOCITY: 109 CM/S
ECHO MV E/A RATIO: 1.18
ECHO MV E/E' LATERAL: 7.96
ECHO MV PRESSURE HALF TIME (PHT): 0.06 S
ECHO PV REGURGITANT MAX VELOCITY: 129 CM/S
ECHO PV REGURGITANT MAX VELOCITY: 152 CM/S
ECHO PV REGURGITANT MAX VELOCITY: 282 CM/S
ECHO RA AREA 4C: 14.5 CM2
ECHO RA MAJOR AXIS: 4.88 CM
ECHO RIGHT VENTRICULAR SYSTOLIC PRESSURE (RVSP): 35 MMHG
ECHO RV INTERNAL DIMENSION: 2.56 CM
ECHO TV MEAN GRADIENT: 4 MMHG
ECHO TV REGURGITANT PEAK GRADIENT: 32 MMHG
ERYTHROCYTE [DISTWIDTH] IN BLOOD BY AUTOMATED COUNT: 12.3 % (ref 11.6–14.5)
GLUCOSE SERPL-MCNC: 115 MG/DL (ref 70–110)
HCT VFR BLD AUTO: 34 % (ref 35–45)
HGB BLD-MCNC: 10.4 G/DL (ref 12–16)
LA VOL DISK BP: 69.21 CM3 (ref 22–52)
MAGNESIUM SERPL-MCNC: 1.9 MG/DL (ref 1.7–2.8)
MCH RBC QN AUTO: 34 PG (ref 24–34)
MCHC RBC AUTO-ENTMCNC: 30.6 G/DL (ref 31–37)
MCV RBC AUTO: 111.1 FL (ref 74–97)
P-R INTERVAL, ECG05: 126 MS
PLATELET # BLD AUTO: 148 K/UL (ref 135–420)
PMV BLD AUTO: 9.2 FL (ref 9.2–11.8)
POTASSIUM SERPL-SCNC: 3.8 MMOL/L (ref 3.2–5.1)
Q-T INTERVAL, ECG07: 325 MS
QRS DURATION, ECG06: 122 MS
QTC CALCULATION (BEZET), ECG08: 442 MS
RBC # BLD AUTO: 3.06 M/UL (ref 4.2–5.3)
SODIUM SERPL-SCNC: 136 MMOL/L (ref 135–145)
TROPONIN I SERPL-MCNC: 0.59 NG/ML (ref 0.02–0.05)
TROPONIN I SERPL-MCNC: 0.83 NG/ML (ref 0.02–0.05)
VENTRICULAR RATE, ECG03: 111 BPM
WBC # BLD AUTO: 12.9 K/UL (ref 4.6–13.2)

## 2020-10-14 PROCEDURE — 99218 HC RM OBSERVATION: CPT

## 2020-10-14 PROCEDURE — 74011250636 HC RX REV CODE- 250/636: Performed by: NURSE PRACTITIONER

## 2020-10-14 PROCEDURE — 74011250637 HC RX REV CODE- 250/637: Performed by: NURSE PRACTITIONER

## 2020-10-14 PROCEDURE — 74011250637 HC RX REV CODE- 250/637: Performed by: INTERNAL MEDICINE

## 2020-10-14 PROCEDURE — 84484 ASSAY OF TROPONIN QUANT: CPT

## 2020-10-14 PROCEDURE — 96376 TX/PRO/DX INJ SAME DRUG ADON: CPT

## 2020-10-14 PROCEDURE — 83735 ASSAY OF MAGNESIUM: CPT

## 2020-10-14 PROCEDURE — 80048 BASIC METABOLIC PNL TOTAL CA: CPT

## 2020-10-14 PROCEDURE — 74011000258 HC RX REV CODE- 258: Performed by: NURSE PRACTITIONER

## 2020-10-14 PROCEDURE — 85027 COMPLETE CBC AUTOMATED: CPT

## 2020-10-14 PROCEDURE — 97530 THERAPEUTIC ACTIVITIES: CPT

## 2020-10-14 PROCEDURE — 36415 COLL VENOUS BLD VENIPUNCTURE: CPT

## 2020-10-14 RX ORDER — BUSPIRONE HYDROCHLORIDE 5 MG/1
10 TABLET ORAL EVERY 12 HOURS
Status: DISCONTINUED | OUTPATIENT
Start: 2020-10-14 | End: 2020-10-15 | Stop reason: HOSPADM

## 2020-10-14 RX ORDER — FUROSEMIDE 40 MG/1
40 TABLET ORAL EVERY 12 HOURS
Status: DISCONTINUED | OUTPATIENT
Start: 2020-10-14 | End: 2020-10-15 | Stop reason: HOSPADM

## 2020-10-14 RX ORDER — FUROSEMIDE 40 MG/1
40 TABLET ORAL 2 TIMES DAILY
Status: DISCONTINUED | OUTPATIENT
Start: 2020-10-14 | End: 2020-10-14

## 2020-10-14 RX ORDER — METOPROLOL SUCCINATE 25 MG/1
12.5 TABLET, EXTENDED RELEASE ORAL DAILY
Status: DISCONTINUED | OUTPATIENT
Start: 2020-10-14 | End: 2020-10-15 | Stop reason: HOSPADM

## 2020-10-14 RX ADMIN — ACETAMINOPHEN 650 MG: 325 TABLET, FILM COATED ORAL at 20:52

## 2020-10-14 RX ADMIN — METOPROLOL SUCCINATE 12.5 MG: 25 TABLET, EXTENDED RELEASE ORAL at 11:38

## 2020-10-14 RX ADMIN — FUROSEMIDE 40 MG: 40 TABLET ORAL at 09:55

## 2020-10-14 RX ADMIN — QUETIAPINE FUMARATE 50 MG: 25 TABLET ORAL at 21:03

## 2020-10-14 RX ADMIN — AMIODARONE HYDROCHLORIDE 200 MG: 200 TABLET ORAL at 09:56

## 2020-10-14 RX ADMIN — Medication 1 TABLET: at 09:55

## 2020-10-14 RX ADMIN — FUROSEMIDE 40 MG: 40 TABLET ORAL at 11:37

## 2020-10-14 RX ADMIN — BACLOFEN 10 MG: 10 TABLET ORAL at 09:56

## 2020-10-14 RX ADMIN — FERROUS SULFATE TAB 325 MG (65 MG ELEMENTAL FE) 325 MG: 325 (65 FE) TAB at 10:01

## 2020-10-14 RX ADMIN — BACLOFEN 10 MG: 10 TABLET ORAL at 16:26

## 2020-10-14 RX ADMIN — APIXABAN 2.5 MG: 2.5 TABLET, FILM COATED ORAL at 20:53

## 2020-10-14 RX ADMIN — GABAPENTIN 300 MG: 300 CAPSULE ORAL at 21:01

## 2020-10-14 RX ADMIN — LISINOPRIL 10 MG: 5 TABLET ORAL at 09:55

## 2020-10-14 RX ADMIN — GABAPENTIN 300 MG: 300 CAPSULE ORAL at 16:26

## 2020-10-14 RX ADMIN — CETIRIZINE HYDROCHLORIDE 10 MG: 10 TABLET, FILM COATED ORAL at 09:55

## 2020-10-14 RX ADMIN — LEVOTHYROXINE SODIUM 200 MCG: 0.1 TABLET ORAL at 09:54

## 2020-10-14 RX ADMIN — DIVALPROEX SODIUM 1000 MG: 500 TABLET, EXTENDED RELEASE ORAL at 20:52

## 2020-10-14 RX ADMIN — BUSPIRONE HYDROCHLORIDE 10 MG: 5 TABLET ORAL at 20:52

## 2020-10-14 RX ADMIN — DIVALPROEX SODIUM 1000 MG: 500 TABLET, EXTENDED RELEASE ORAL at 09:56

## 2020-10-14 RX ADMIN — ACETAMINOPHEN 650 MG: 325 TABLET, FILM COATED ORAL at 11:40

## 2020-10-14 RX ADMIN — BACLOFEN 10 MG: 10 TABLET ORAL at 21:01

## 2020-10-14 RX ADMIN — CEFTRIAXONE SODIUM 1 G: 1 INJECTION, POWDER, FOR SOLUTION INTRAMUSCULAR; INTRAVENOUS at 09:57

## 2020-10-14 RX ADMIN — FUROSEMIDE 40 MG: 40 TABLET ORAL at 21:03

## 2020-10-14 RX ADMIN — GABAPENTIN 300 MG: 300 CAPSULE ORAL at 09:55

## 2020-10-14 RX ADMIN — BUSPIRONE HYDROCHLORIDE 10 MG: 5 TABLET ORAL at 09:56

## 2020-10-14 RX ADMIN — APIXABAN 2.5 MG: 2.5 TABLET, FILM COATED ORAL at 09:56

## 2020-10-14 RX ADMIN — MAGNESIUM OXIDE TAB 400 MG (241.3 MG ELEMENTAL MG) 400 MG: 400 (241.3 MG) TAB at 09:55

## 2020-10-14 NOTE — PROGRESS NOTES
CARDIOLOGY PROGRESS NOTE Patient seen and examined. This is a patient who is followed for elevated troponin. She denies chest pain or shortness of breath. No other complaints reported. Telemetry reviewed, there were no events noted in the past 24 hours. She is in sinus rhythm. Pertinent review of systems items noted above, all other systems are negative. Current medications reviewed. Physical Examination Vital signs are stable. Blood pressure 113/48, Pulse 106 No apparent distress. Heart is regular, rate and rhythm. Normal S1, S2, no murmurs are appreciated. Lungs are clear bilaterally. Abdomen is soft, nontender, normal bowel sounds. She is a right AKA. Her left leg has 2+ edema. Labs reviewed:  
Recent Results (from the past 24 hour(s)) ECHO ADULT COMPLETE Collection Time: 10/13/20  5:00 PM  
Result Value Ref Range LA Area 2C 20.20 cm2 LA Area 4C 21.00 cm2 Left Atrium Major Axis 5.25 cm Left Atrium to Aortic Root Ratio 1.53 LA Volume DISK BP 69.21 22 - 52 cm3 Right Atrial Area 4C 14.50 cm2 Right Atrium Major Axis 4.88 cm Est. RA Pressure 3.00 mmHg AV Cusp 2.00 cm Aortic Valve Systolic Mean Gradient 0.79 mmHg AoV VTI 22.60 cm Pulmonic Regurgitant End Max Velocity 152.00 cm/s AoV PG 9.00 mmHg Aortic Valve Area by Continuity of VTI 2.89 cm2 Aortic Valve Area by Continuity of Peak Velocity 2.66 cm2 MARLEY/BSA 1.38 Ao Root D 3.00 cm IVSd 1.00 (A) 0.6 - 0.9 cm LVIDd 5.20 3.9 - 5.3 cm LVIDs 3.30 cm LVOT d 2.00 cm TV MG 4.00 mmHg LVOT VTI 20.80 cm  
 LVOT VTI 20.80 cm Pulmonic Regurgitant End Max Velocity 129.00 cm/s LVOT Peak Gradient 7.00 mmHg LVPWd 1.20 (A) 0.6 - 0.9 cm  
 LV E' Lateral Velocity 13.70 cm/s  
 BP EF 66.1 55 - 100 % E/E' lateral 7.96 Mitral Valve E Wave Deceleration Time 0.19 s Mitral Valve Pressure Half-time 0.06 s  MV A Elliott 92.40 cm/s  
 MV E Elliott 109.00 cm/s  
 MVA (PHT) 4.00 cm2  
 MV E/A 1.18 RVIDd 2.56 cm  
 RVSP 35.00 mmHg Pulmonic Regurgitant End Max Velocity 282.00 cm/s Triscuspid Valve Regurgitation Peak Gradient 32.00 mmHg LV Mass .8 67 - 162 g  
 LV Mass AL Index 93.6 43 - 95 g/m2 Left Atrium Minor Axis 2.23 cm  
 MARLEY/BSA Pk Elliott 1.1 cm2/m2 MARLEY/BSA VTI 1.2 cm2/m2 TROPONIN I Collection Time: 10/13/20  5:10 PM  
Result Value Ref Range Troponin-I, Qt. 0.29 (H) 0.02 - 0.05 ng/mL EKG, 12 LEAD, SUBSEQUENT Collection Time: 10/13/20  7:15 PM  
Result Value Ref Range Ventricular Rate 111 BPM  
 Atrial Rate 112 BPM  
 P-R Interval 126 ms  
 QRS Duration 122 ms  
 Q-T Interval 325 ms QTC Calculation (Bezet) 442 ms Calculated P Axis 52 degrees Calculated R Axis -30 degrees Calculated T Axis 114 degrees Diagnosis Sinus tachycardia Left bundle branch block Confirmed by [facility], [address] [facility] ([id number]) on 10/14/2020 12:23:50 PM 
  
TROPONIN I Collection Time: 10/13/20 11:00 PM  
Result Value Ref Range Troponin-I, Qt. 0.83 (H) 0.02 - 0.05 ng/mL METABOLIC PANEL, BASIC Collection Time: 10/14/20  4:17 AM  
Result Value Ref Range Sodium 136 135 - 145 mmol/L Potassium 3.8 3.2 - 5.1 mmol/L Chloride 104 94 - 111 mmol/L  
 CO2 26 21 - 33 mmol/L Anion gap 6 mmol/L Glucose 115 (H) 70 - 110 mg/dL BUN 32 (H) 9 - 21 mg/dL Creatinine 1.00 0.70 - 1.20 mg/dL BUN/Creatinine ratio 32 GFR est AA >60 ml/min/1.73m2 GFR est non-AA 54 ml/min/1.73m2 Calcium 8.0 (L) 8.5 - 10.5 mg/dL MAGNESIUM Collection Time: 10/14/20  4:17 AM  
Result Value Ref Range Magnesium 1.9 1.7 - 2.8 mg/dL CBC W/O DIFF Collection Time: 10/14/20  4:17 AM  
Result Value Ref Range WBC 12.9 4.6 - 13.2 K/uL  
 RBC 3.06 (L) 4.20 - 5.30 M/uL  
 HGB 10.4 (L) 12.0 - 16.0 g/dL HCT 34.0 (L) 35.0 - 45.0 % .1 (H) 74.0 - 97.0 FL  
 MCH 34.0 24.0 - 34.0 PG  
 MCHC 30.6 (L) 31.0 - 37.0 g/dL  
 RDW 12.3 11.6 - 14.5 % PLATELET 778 292 - 834 K/uL MPV 9.2 9.2 - 11.8 FL  
TROPONIN I Collection Time: 10/14/20  4:17 AM  
Result Value Ref Range Troponin-I, Qt. 0.59 (H) 0.02 - 0.05 ng/mL Discussed case with Dr. Devorah Bhatia, and our impression and recommendations are as follows: 1. Elevated troponins: Troponins peaked at 0.83 at 2300 last night but have trended down. She remains free of chest pain. EKGs indicate a left bundle branch block, but this is old and was previously demonstrated on an EKG from 2018. Troponin elevation is due to infectious process from UTI. Will obtain an echocardiogram to assess overall cardiac structure and function. Will add metoprolol XL 12.5 mg daily. The patient will need outpatient ischemic evaluation.  
  
2. Atrial fibrillation: She is currently in sinus rhythm and rate is controlled. Continue amiodarone. Continue Eliquis for anticoagulation. Continue magnesium supplementation. Continue telemetry monitoring. Keep serum potassium between 4-5 and serum magnesium > 2.  
  
3. Hypertension: Blood pressures are low-normal. Monitor closely. 
  
4.  History of CHF: No rales on exam but persistent edema noted to LLE. Will increase Lasix to 40 mg PO BID. Echocardiogram is pending, as above. Monitor volume status closely.  
  
Thank you for involving us in the care of this patient. Please do not hesitate to call me or Dr. Devorah Bhatia if additional questions arise.

## 2020-10-14 NOTE — PROGRESS NOTES
Problem: Mobility Impaired (Adult and Pediatric) Goal: *Acute Goals and Plan of Care (Insert Text) Description: Physical Therapy Goals Initiated 10/13/2020 and to be accomplished within 7 day(s) 1. Patient will move from supine to sit and sit to supine , scoot up and down, and roll side to side in bed with minimal assistance/contact guard assist.   
2.  Patient will transfer from bed to chair and chair to bed with moderate assistance  using the least restrictive device. 3.  Patient will perform sit to stand with minimal assistance/contact guard assist. 
4.  Patient will ambulate with minimal assistance/contact guard assist for 20 feet with the least restrictive device. PLOF: Pt was non-ambulatory and using the power wheelchair for all mobility Outcome: Progressing Towards Goal 
 PHYSICAL THERAPY TREATMENT Patient: Christ London (42 y.o. female) Date: 10/14/2020 Diagnosis: Elevated troponin [R77.8] UTI (urinary tract infection) [N39.0] Elevated troponin [R77.8] UTI (urinary tract infection) [N39.0] <principal problem not specified> Precautions:   
 
ASSESSMENT: 
 
Progression toward goals: Patient able to demonstrate good understanding of transfer technique to W/C [x]      Improving appropriately and progressing toward goals 
[]      Improving slowly and progressing toward goals 
[]      Not making progress toward goals and plan of care will be adjusted PLAN: 
Patient continues to benefit from skilled intervention to address the above impairments. Continue treatment per established plan of care. Discharge Recommendations:  Home Health Further Equipment Recommendations for Discharge:  N/A  
 
SUBJECTIVE:  
Patient reported feeling stronger today and her  stands bye when she transfers at home. OBJECTIVE DATA SUMMARY:  
Critical Behavior: 
Neurologic State: Alert Orientation Level: Oriented X4 Cognition: Appropriate decision making Functional Mobility Training: 
Bed Mobility: 
Rolling: Stand-by assistance Supine to Sit: Stand-by assistance Scooting: Stand-by assistance Transfers: 
Sit to Stand: Contact guard assistance Stand to Sit: Contact guard assistance Bed to Chair: Contact guard assistance Interventions: Safety awareness training Balance: 
Sitting: Without support; Intact Standing: Impaired Pain: No reported Pain Activity Tolerance:  
Please refer to the flowsheet for vital signs taken during this treatment. After treatment:  
[x] Patient left in no apparent distress sitting up in chair 
[] Patient left in no apparent distress in bed 
[x] Call bell left within reach [x] Nursing notified 
[] Caregiver present 
[] Bed alarm activated 
[] SCDs applied COMMUNICATION/EDUCATION:  
[]         Role of Physical Therapy in the acute care setting. []         Fall prevention education was provided and the patient/caregiver indicated understanding. []         Patient/family have participated as able in working toward goals and plan of care. [x]         Patient/family agree to work toward stated goals and plan of care. []         Patient understands intent and goals of therapy, but is neutral about his/her participation. []         Patient is unable to participate in stated goals/plan of care: ongoing with therapy staff. 
[]         Other: 
 
   
Claudia Pickup, PTA Time Calculation: 22 mins

## 2020-10-14 NOTE — PROGRESS NOTES
PATIENT REMAINS AWAKE. BEDTIME MEDICATION GIVEN AS ORDERED. TYLENOL 2 TABLETS GIVEN AS REQUESTED FOR PAIN 2 OUT OF 10.

## 2020-10-14 NOTE — PROGRESS NOTES
Progress Notes Subjective:  
 
Juan Miguel Devi is a 77 y.o. female  has a past medical history of CHF (congestive heart failure) ([facility] [address] Chronic neck and back pain, Frequent UTI, Hypercholesteremia, Hypertension, and Obese. and Right above knee amputation admitted 10/12/20 for UTI and elevated troponin. Today is hospital day #3: 
Seen and examined at bedside. Patient is requesting to go home. No acute complaints reported. No chest pain,  fever, chills, n/v/d, abdominal pain or discomfort. No acute overnight event reported. Past Medical History:  
Diagnosis Date  CHF (congestive heart failure) ([facility] [address]  Chronic neck and back pain  Frequent UTI  Hypercholesteremia  Hypertension  Obese Past Surgical History:  
Procedure Laterality Date  HX ABOVE KNEE AMPUTATION    
 right  HX CERVICAL FUSION    
 HX CHOLECYSTECTOMY  HX GASTRIC BYPASS  HX HYSTERECTOMY  HX ORTHOPAEDIC    
 neck surgery  HX PARTIAL THYROIDECTOMY History reviewed. No pertinent family history. Social History Tobacco Use  Smoking status: Never Smoker  Smokeless tobacco: Never Used Substance Use Topics  Alcohol use: Yes Comment: rare Prior to Admission medications Medication Sig Start Date End Date Taking? Authorizing Provider  
divalproex ER (DEPAKOTE ER) 500 mg ER tablet Take 1,000 mg by mouth every twelve (12) hours. 10/10/19  Yes Mariama Saini MD  
fluticasone propionate (FLONASE) 50 mcg/actuation nasal spray 2 Sprays by Both Nostrils route daily. 1/1/19  Yes Mariama Saini MD  
fexofenadine (Allegra Allergy) 180 mg tablet Take 1 Tab by mouth daily. Mariama Saini MD  
omega-3 fatty acids (Fish Oil Concentrate) 1,000 mg cap Take 1 Tab by mouth daily. Mariama Saini MD  
magnesium oxide (MAG-OX) 400 mg tablet Take 1 Tab by mouth daily.     Mariama Saini MD  
b complex-vitamin c-folic acid 0.8 mg (Isabel-Seth) 0.8 mg tab tablet Take 1 Tab by mouth daily. Yeny, MD Mariama  
diclofenac (Voltaren) 1 % gel Apply 1 Applicator to affected area as needed. Yeny, MD Mariama  
amiodarone (CORDARONE) 200 mg tablet Take 1 Tab by mouth daily. 8/2/20   Mariama Saini MD  
Eliquis 2.5 mg tablet Take 1 Tab by mouth daily. 9/18/20   Mariama Saini MD  
cranberry extract 425 mg cap Take 425 mg by mouth two (2) times a day. Mariama Saini MD  
Isabel-Seth 0.8 mg tab tablet Take 1 Tab by mouth daily. 9/24/20   Mariama Saini MD  
ibuprofen (MOTRIN) 200 mg tablet Take 400 mg by mouth daily as needed. Mariama Saini MD  
levothyroxine (SYNTHROID) 200 mcg tablet Take 1 Tab by mouth daily. 10/8/20   Mariama Saini MD  
lisinopriL (PRINIVIL, ZESTRIL) 10 mg tablet Take 1 Tab by mouth daily. Mariama Saini MD  
gabapentin (NEURONTIN) 300 mg capsule Take 1 Cap by mouth three (3) times daily. Max Daily Amount: 900 mg. Indications: neuropathic pain 9/30/20   Kallie Moeller MD  
ferrous sulfate 325 mg (65 mg iron) tablet TAKE 1 TABLET EVERY DAY 9/30/20   Kallie Moeller MD  
furosemide (LASIX) 20 mg tablet Take 2 Tabs by mouth daily. 9/28/20   Kallie Moeller MD  
baclofen (LIORESAL) 10 mg tablet TAKE 1 TABLET THREE TIMES DAILY 9/23/20   Kallie Moeller MD  
QUEtiapine (SEROquel) 50 mg tablet TAKE 1 TABLET BY MOUTH EVERYDAY AT BEDTIME 8/18/20   Jan Farley NP  
busPIRone (BUSPAR) 10 mg tablet TAKE 1 TABLET TWICE DAILY 8/17/20 11/15/20  Kymberly Marquez NP Allergies Allergen Reactions  Codeine Unknown (comments)  Hydromorphone Unknown (comments) Patient states it makes her not stop talking, jittery  Prednisone Unknown (comments)  Succinylcholine Chloride Unknown (comments) Review of Systems: 
14 point ROS reviewed and were negative except as mentioned in HPI. Objective:  
 
Vitals: 
Visit Vitals BP (!) 123/50 Pulse 82 Temp 98.9 °F (37.2 °C) Resp 18 Ht 5' 8\" (1.727 m) Wt 127 kg (280 lb) SpO2 95% BMI 42.57 kg/m² Physical Exam: 
General: Alert, oriented x 4, cooperative, no acute distress. Head:  Normocephalic, without obvious abnormality, atraumatic. Eyes:  Conjunctivae/corneas clear. Pupils equal, round, reactive to light. Extraocular movements intact. Lungs:  Clear to auscultation bilaterally, no wheezes, no crackles. Chest wall: No tenderness or deformity. Heart:  Regular rate and rhythm, S1, S2 normal, no murmur, click, rub, or gallop. Abdomen:   Soft, non-tender. Bowel sounds normal. No masses. No organomegaly. Back:  No spine tenderness to palpation Extremities: Right AKA (chronic), no cyanosis or peripheral edema. Pulses: Symmetric all extremities. Skin: Skin color, texture, turgor normal.  
Lymph nodes: Cervical nodes normal. 
Neurologic: Awake and oriented, x4. CN II-XII intact. No focal neuro deficits noted. Labs: 
Recent Results (from the past 24 hour(s)) ECHO ADULT COMPLETE Collection Time: 10/13/20  5:00 PM  
Result Value Ref Range LA Area 2C 20.20 cm2 LA Area 4C 21.00 cm2 Left Atrium Major Axis 5.25 cm Left Atrium to Aortic Root Ratio 1.53 LA Volume DISK BP 69.21 22 - 52 cm3 Right Atrial Area 4C 14.50 cm2 Right Atrium Major Axis 4.88 cm Est. RA Pressure 3.00 mmHg AV Cusp 2.00 cm Aortic Valve Systolic Mean Gradient 7.44 mmHg AoV VTI 22.60 cm Pulmonic Regurgitant End Max Velocity 152.00 cm/s AoV PG 9.00 mmHg Aortic Valve Area by Continuity of VTI 2.89 cm2 Aortic Valve Area by Continuity of Peak Velocity 2.66 cm2 MARLEY/BSA 1.38 Ao Root D 3.00 cm IVSd 1.00 (A) 0.6 - 0.9 cm LVIDd 5.20 3.9 - 5.3 cm LVIDs 3.30 cm LVOT d 2.00 cm TV MG 4.00 mmHg LVOT VTI 20.80 cm  
 LVOT VTI 20.80 cm Pulmonic Regurgitant End Max Velocity 129.00 cm/s LVOT Peak Gradient 7.00 mmHg LVPWd 1.20 (A) 0.6 - 0.9 cm  
 LV E' Lateral Velocity 13.70 cm/s  
 BP EF 66.1 55 - 100 %  E/E' lateral 7.96   
 Mitral Valve E Wave Deceleration Time 0.19 s Mitral Valve Pressure Half-time 0.06 s MV A Elliott 92.40 cm/s  
 MV E Elliott 109.00 cm/s  
 MVA (PHT) 4.00 cm2  
 MV E/A 1.18 RVIDd 2.56 cm  
 RVSP 35.00 mmHg Pulmonic Regurgitant End Max Velocity 282.00 cm/s Triscuspid Valve Regurgitation Peak Gradient 32.00 mmHg LV Mass .8 67 - 162 g  
 LV Mass AL Index 93.6 43 - 95 g/m2 Left Atrium Minor Axis 2.23 cm  
 MARLEY/BSA Pk Elliott 1.1 cm2/m2 MARELY/BSA VTI 1.2 cm2/m2 TROPONIN I Collection Time: 10/13/20  5:10 PM  
Result Value Ref Range Troponin-I, Qt. 0.29 (H) 0.02 - 0.05 ng/mL EKG, 12 LEAD, SUBSEQUENT Collection Time: 10/13/20  7:15 PM  
Result Value Ref Range Ventricular Rate 111 BPM  
 Atrial Rate 112 BPM  
 P-R Interval 126 ms  
 QRS Duration 122 ms  
 Q-T Interval 325 ms QTC Calculation (Bezet) 442 ms Calculated P Axis 52 degrees Calculated R Axis -30 degrees Calculated T Axis 114 degrees Diagnosis Sinus tachycardia Left bundle branch block Confirmed by [facility], [facility] [address] ([id number]) on 10/14/2020 12:23:50 PM 
  
TROPONIN I Collection Time: 10/13/20 11:00 PM  
Result Value Ref Range Troponin-I, Qt. 0.83 (H) 0.02 - 0.05 ng/mL METABOLIC PANEL, BASIC Collection Time: 10/14/20  4:17 AM  
Result Value Ref Range Sodium 136 135 - 145 mmol/L Potassium 3.8 3.2 - 5.1 mmol/L Chloride 104 94 - 111 mmol/L  
 CO2 26 21 - 33 mmol/L Anion gap 6 mmol/L Glucose 115 (H) 70 - 110 mg/dL BUN 32 (H) 9 - 21 mg/dL Creatinine 1.00 0.70 - 1.20 mg/dL BUN/Creatinine ratio 32 GFR est AA >60 ml/min/1.73m2 GFR est non-AA 54 ml/min/1.73m2 Calcium 8.0 (L) 8.5 - 10.5 mg/dL MAGNESIUM Collection Time: 10/14/20  4:17 AM  
Result Value Ref Range Magnesium 1.9 1.7 - 2.8 mg/dL CBC W/O DIFF Collection Time: 10/14/20  4:17 AM  
Result Value Ref Range WBC 12.9 4.6 - 13.2 K/uL  
 RBC 3.06 (L) 4.20 - 5.30 M/uL  
 HGB 10.4 (L) 12.0 - 16.0 g/dL HCT 34.0 (L) 35.0 - 45.0 % .1 (H) 74.0 - 97.0 FL  
 MCH 34.0 24.0 - 34.0 PG  
 MCHC 30.6 (L) 31.0 - 37.0 g/dL  
 RDW 12.3 11.6 - 14.5 % PLATELET 044 662 - 966 K/uL MPV 9.2 9.2 - 11.8 FL  
TROPONIN I Collection Time: 10/14/20  4:17 AM  
Result Value Ref Range Troponin-I, Qt. 0.59 (H) 0.02 - 0.05 ng/mL Imaging: No results found. Assessment & Plan: #1: UTI:  
-Hx of recurrent UTIs. Afebrile last 24 hours. Wbc 12.9. 
-Pending sensitivity results. 
-Continue ceftriaxone 1 gm daily. 
-anticipate switch to oral abx, and possible d/c in a.m if she remains afebrile. #2:Elevated troponin:  
-without chest pain, troponin leak likely demand ischemia from multifactorial etiologies including infections and chronic chf. 
-Seen and evaluated by cardiologist. ACS r/o.  
-continue plan per cardio. #3: Atrial fibrillation:  
-Chronic, rates controlled 
-Continue amiodarone, metoprolol and eliquis. 
-continue to monitor on telemetry. #4: Diastolic CHF: 
-Chronic condition, no acute exacerbation. -LVEF 66% with mild left concentric hypertrophy. 
-Continue oral lasix per home dose. #5: Bipolar disorder: 
-Chronic condition, and on depakote. #6: HTN: 
-Chronic condition, normotensive. 
-continue lisinopril, metoprolol. VTE Prophylaxis: Eliquis Code Status: Full code.

## 2020-10-14 NOTE — PROGRESS NOTES
Tylenol 650 mg PO given for c/o of right should pain 6/10 scale. Pt sitting up in wheel chair. Will cont to monitor.

## 2020-10-14 NOTE — PROGRESS NOTES
OCCUPATIONAL THERAPY EVALUATION/DISCHARGE Patient: Aggie Monzon (87 y.o. female) Date: 10/13/2020 Primary Diagnosis: Elevated troponin [R77.8] UTI (urinary tract infection) [N39.0] Elevated troponin [R77.8] UTI (urinary tract infection) [N39.0] Precautions: R AKA, fall PLOF: lives at home with , uses w/c for mobility in home ASSESSMENT AND RECOMMENDATIONS: 
Based on the objective data described below, the patient presents with declines in ADLs and mobility . Skilled occupational therapy is not indicated at this time. Discharge Recommendations: home Further Equipment Recommendations for Discharge:none recommended SUBJECTIVE:  
Patient stated i think Im doing alright.  OBJECTIVE DATA SUMMARY:  
 
Past Medical History:  
Diagnosis Date  CHF (congestive heart failure) ([facility] [address]  Chronic neck and back pain  Frequent UTI  Hypercholesteremia  Hypertension  Obese Past Surgical History:  
Procedure Laterality Date  HX ABOVE KNEE AMPUTATION    
 right  HX CERVICAL FUSION    
 HX CHOLECYSTECTOMY  HX GASTRIC BYPASS  HX HYSTERECTOMY  HX ORTHOPAEDIC    
 neck surgery  HX PARTIAL THYROIDECTOMY Barriers to Learning/Limitations: None Compensate with: visual, verbal, tactile, kinesthetic cues/model Home Situation:  
Home Situation Home Environment: Private residence Wheelchair Ramp: Yes One/Two Story Residence: One story Living Alone: No 
Support Systems: Family member(s) Patient Expects to be Discharged to[de-identified] Private residence Current DME Used/Available at Home: Wheelchair, power 
[]     Right hand dominant   []     Left hand dominant Cognitive/Behavioral Status: 
Neurologic State: Alert; Appropriate for age Orientation Level: Appropriate for age;Oriented X4 Cognition: Memory loss Skin: intact Edema: none noted Vision/Perceptual: WFLs Coordination: BUE 
 WFLs  
    
  
 
Balance: Sitting: With support Standing: Impaired Strength: BUE Strength: Generally decreased, functional 
  
  
  
  
Tone & Sensation: BUE Sensation: Intact Range of Motion: BUE 
 
AROM: Generally decreased, functional 
  
  
  
  
  
  
  
 
Functional Mobility and Transfers for ADLs: 
Bed Mobility: 
Rolling: Minimum assistance Supine to Sit: Moderate assistance Sit to Supine: Moderate assistance Scooting: Moderate assistance Transfers: 
Sit to Stand: Moderate assistance Stand to Sit: Minimum assistance ADL Assessment: 
 Pt making good progress with mobility and basic ADLs. No skilled OT indicated at present as pt at [facility] Therapeutic Exercise: 
Full B UE AROM Pain: 
Pain level pre-treatment: 0/10 Pain level post-treatment: 0/10 Activity Tolerance:  
 
Please refer to the flowsheet for vital signs taken during this treatment. After treatment:  
[]  Patient left in no apparent distress sitting up in chair 
[x]  Patient left in no apparent distress in bed 
[x]  Call bell left within reach 
[]  Nursing notified 
[]  Caregiver present 
[]  Bed alarm activated COMMUNICATION/EDUCATION:  
[x]      Role of Occupational Therapy in the acute care setting 
[]      Home safety education was provided and the patient/caregiver indicated understanding. []      Patient/family have participated as able and agree with findings and recommendations. []      Patient is unable to participate in plan of care at this time. Thank you for this referral. 
Izabel Lewis MS, OTR/AGUSTIN Rushing Complexity: History: MEDIUM Complexity : Expanded review of history including physical, cognitive and psychosocial  history ;   
Examination: MEDIUM Complexity : 3-5 performance deficits relating to physical, cognitive , or psychosocial skils that result in activity limitations and / or participation restrictions;  
 Decision Making:MEDIUM Complexity : Patient may present with comorbidities that affect occupational performnce. Miniml to moderate modification of tasks or assistance (eg, physical or verbal ) with assesment(s) is necessary to enable patient to complete evaluation

## 2020-10-14 NOTE — PROGRESS NOTES
conducted an initial consultation and Spiritual Assessment for Clarita Chatterjee, who is a 77 y.o.,female. Patients Primary Language is: [place]. According to the patients EMR Yazidism Affiliation is: Taoist. The reason the Patient came to the hospital is:  
Patient Active Problem List  
 Diagnosis Date Noted  UTI (urinary tract infection) 10/12/2020  Elevated troponin 10/12/2020  CKD (chronic kidney disease) stage 3, GFR 30-59 ml/min 10/08/2019  Bipolar I disorder, current episode depressed ([facility] [address] 03/10/2019  Psychological factors affecting medical condition 03/10/2019  Acquired hypothyroidism 12/30/2018  Acute non-recurrent maxillary sinusitis 12/30/2018  Bipolar disorder, in full remission, most recent episode mixed ([address] 12/30/2018  Macrocytic anemia 12/30/2018  Paroxysmal atrial fibrillation ([address] 12/30/2018  Phantom limb pain ([facility] [address] 12/30/2018  Generalized weakness 12/29/2018  Malaise 12/29/2018 The  provided the following Interventions: 
Initiated a relationship of care and support. Explored issues of judd, spirituality and/or Church needs while hospitalized. Listened empathically. Provided chaplaincy education. Provided information about Spiritual Care Services. Offered prayer and assurance of continued prayers on patient's behalf. Chart reviewed. The following outcomes were achieved: 
Patient shared some information about their medical narrative and spiritual journey/beliefs. Patient processed feeling about current hospitalization. Patient expressed gratitude for the 's visit. Assessment: 
Patient did not indicate any spiritual or Church issues which require Spiritual Care Services interventions at this time. Patient does not have any Church/cultural needs that will affect patients preferences in health care.  
 
Plan: 
Chaplains will continue to follow and will provide pastoral care on an as needed or requested basis.  recommends bedside caregivers page  on duty if patient shows signs of acute spiritual or emotional distress. [address] [facility] Staff  Spiritual Care  
[phone]

## 2020-10-14 NOTE — PROGRESS NOTES
PATIENT REMOVED FROM BED PAN AFTER HAVING LARGE TARY STOOL. FULL BODY ASSESSMENT COMPLETED. SKIN WARM AND DRY. RESPIRATIOMS EASY AND UNLABORED. PATIENT ALERT AND ORIENTED. PATIENT REMAINS ON TELEMENTRY AS AS ORDERED. CALL BELL IN REACH. SIDE RAILS UP X2 AND BED IN LOWEST POSITION. NEW PURWICK PLACED ON PATIENT/SOILED DIPER REMOVED. VITAL SIGNS OBTAINED AT THIS TIME.

## 2020-10-15 VITALS
SYSTOLIC BLOOD PRESSURE: 122 MMHG | RESPIRATION RATE: 15 BRPM | DIASTOLIC BLOOD PRESSURE: 78 MMHG | WEIGHT: 280 LBS | HEART RATE: 87 BPM | TEMPERATURE: 98.5 F | BODY MASS INDEX: 42.44 KG/M2 | HEIGHT: 68 IN | OXYGEN SATURATION: 100 %

## 2020-10-15 LAB
ANION GAP SERPL CALC-SCNC: 7 MMOL/L
BACTERIA SPEC CULT: ABNORMAL
BUN SERPL-MCNC: 32 MG/DL (ref 9–21)
BUN/CREAT SERPL: 32
CA-I BLD-MCNC: 8.2 MG/DL (ref 8.5–10.5)
CHLORIDE SERPL-SCNC: 106 MMOL/L (ref 94–111)
CO2 SERPL-SCNC: 28 MMOL/L (ref 21–33)
COLONY COUNT,CNT: ABNORMAL
CREAT SERPL-MCNC: 1 MG/DL (ref 0.7–1.2)
ERYTHROCYTE [DISTWIDTH] IN BLOOD BY AUTOMATED COUNT: 12.3 % (ref 11.6–14.5)
GLUCOSE SERPL-MCNC: 94 MG/DL (ref 70–110)
HCT VFR BLD AUTO: 34.5 % (ref 35–45)
HGB BLD-MCNC: 10.5 G/DL (ref 12–16)
MAGNESIUM SERPL-MCNC: 1.8 MG/DL (ref 1.7–2.8)
MCH RBC QN AUTO: 33.7 PG (ref 24–34)
MCHC RBC AUTO-ENTMCNC: 30.4 G/DL (ref 31–37)
MCV RBC AUTO: 110.6 FL (ref 74–97)
PLATELET # BLD AUTO: 148 K/UL (ref 135–420)
PMV BLD AUTO: 9.8 FL (ref 9.2–11.8)
POTASSIUM SERPL-SCNC: 3.8 MMOL/L (ref 3.2–5.1)
RBC # BLD AUTO: 3.12 M/UL (ref 4.2–5.3)
SODIUM SERPL-SCNC: 141 MMOL/L (ref 135–145)
SPECIAL REQUESTS,SREQ: ABNORMAL
TROPONIN I SERPL-MCNC: 0.09 NG/ML (ref 0.02–0.05)
WBC # BLD AUTO: 7.8 K/UL (ref 4.6–13.2)

## 2020-10-15 PROCEDURE — 74011250637 HC RX REV CODE- 250/637: Performed by: INTERNAL MEDICINE

## 2020-10-15 PROCEDURE — 74011250636 HC RX REV CODE- 250/636: Performed by: NURSE PRACTITIONER

## 2020-10-15 PROCEDURE — 80048 BASIC METABOLIC PNL TOTAL CA: CPT

## 2020-10-15 PROCEDURE — 99218 HC RM OBSERVATION: CPT

## 2020-10-15 PROCEDURE — 36415 COLL VENOUS BLD VENIPUNCTURE: CPT

## 2020-10-15 PROCEDURE — 96376 TX/PRO/DX INJ SAME DRUG ADON: CPT

## 2020-10-15 PROCEDURE — 74011250637 HC RX REV CODE- 250/637: Performed by: NURSE PRACTITIONER

## 2020-10-15 PROCEDURE — 85027 COMPLETE CBC AUTOMATED: CPT

## 2020-10-15 PROCEDURE — 84484 ASSAY OF TROPONIN QUANT: CPT

## 2020-10-15 PROCEDURE — 83735 ASSAY OF MAGNESIUM: CPT

## 2020-10-15 PROCEDURE — 74011000258 HC RX REV CODE- 258: Performed by: NURSE PRACTITIONER

## 2020-10-15 RX ORDER — ACETAMINOPHEN 500 MG
500 TABLET ORAL
Qty: 60 TAB | Refills: 1 | Status: SHIPPED | OUTPATIENT
Start: 2020-10-15 | End: 2021-01-01

## 2020-10-15 RX ADMIN — LEVOTHYROXINE SODIUM 200 MCG: 0.1 TABLET ORAL at 08:24

## 2020-10-15 RX ADMIN — BACLOFEN 10 MG: 10 TABLET ORAL at 08:24

## 2020-10-15 RX ADMIN — FERROUS SULFATE TAB 325 MG (65 MG ELEMENTAL FE) 325 MG: 325 (65 FE) TAB at 08:24

## 2020-10-15 RX ADMIN — GABAPENTIN 300 MG: 300 CAPSULE ORAL at 08:24

## 2020-10-15 RX ADMIN — MAGNESIUM OXIDE TAB 400 MG (241.3 MG ELEMENTAL MG) 400 MG: 400 (241.3 MG) TAB at 08:24

## 2020-10-15 RX ADMIN — CEFTRIAXONE SODIUM 1 G: 1 INJECTION, POWDER, FOR SOLUTION INTRAMUSCULAR; INTRAVENOUS at 08:25

## 2020-10-15 RX ADMIN — BUSPIRONE HYDROCHLORIDE 10 MG: 5 TABLET ORAL at 08:25

## 2020-10-15 RX ADMIN — CETIRIZINE HYDROCHLORIDE 10 MG: 10 TABLET, FILM COATED ORAL at 08:24

## 2020-10-15 RX ADMIN — METOPROLOL SUCCINATE 12.5 MG: 25 TABLET, EXTENDED RELEASE ORAL at 08:25

## 2020-10-15 RX ADMIN — FUROSEMIDE 40 MG: 40 TABLET ORAL at 08:24

## 2020-10-15 RX ADMIN — Medication 1 TABLET: at 08:24

## 2020-10-15 RX ADMIN — LISINOPRIL 10 MG: 5 TABLET ORAL at 08:24

## 2020-10-15 RX ADMIN — APIXABAN 2.5 MG: 2.5 TABLET, FILM COATED ORAL at 08:24

## 2020-10-15 RX ADMIN — DIVALPROEX SODIUM 1000 MG: 500 TABLET, EXTENDED RELEASE ORAL at 08:24

## 2020-10-15 RX ADMIN — AMIODARONE HYDROCHLORIDE 200 MG: 200 TABLET ORAL at 08:24

## 2020-10-15 RX ADMIN — POLYETHYLENE GLYCOL 3350 17 G: 17 POWDER, FOR SOLUTION ORAL at 08:24

## 2020-10-15 NOTE — PROGRESS NOTES
CARDIOLOGY PROGRESS NOTE Patient seen and examined. This is a patient who is followed for elevated troponin. She denies chest pain or shortness of breath. No other complaints reported. Telemetry reviewed, there were no events noted in the past 24 hours. She is in sinus rhythm. Pertinent review of systems items noted above, all other systems are negative. Current medications reviewed. Physical Examination Vital signs are stable. Blood pressure 113/48, Pulse 106 No apparent distress. Heart is regular, rate and rhythm. Normal S1, S2, no murmurs are appreciated. Lungs are clear bilaterally. Abdomen is soft, nontender, normal bowel sounds. She is a right AKA. Her left leg has 2+ edema. Labs reviewed:  
Recent Results (from the past 24 hour(s)) METABOLIC PANEL, BASIC Collection Time: 10/15/20  3:30 AM  
Result Value Ref Range Sodium 141 135 - 145 mmol/L Potassium 3.8 3.2 - 5.1 mmol/L Chloride 106 94 - 111 mmol/L  
 CO2 28 21 - 33 mmol/L Anion gap 7 mmol/L Glucose 94 70 - 110 mg/dL BUN 32 (H) 9 - 21 mg/dL Creatinine 1.00 0.70 - 1.20 mg/dL BUN/Creatinine ratio 32 GFR est AA >60 ml/min/1.73m2 GFR est non-AA 54 ml/min/1.73m2 Calcium 8.2 (L) 8.5 - 10.5 mg/dL MAGNESIUM Collection Time: 10/15/20  3:30 AM  
Result Value Ref Range Magnesium 1.8 1.7 - 2.8 mg/dL CBC W/O DIFF Collection Time: 10/15/20  3:30 AM  
Result Value Ref Range WBC 7.8 4.6 - 13.2 K/uL  
 RBC 3.12 (L) 4.20 - 5.30 M/uL  
 HGB 10.5 (L) 12.0 - 16.0 g/dL HCT 34.5 (L) 35.0 - 45.0 % .6 (H) 74.0 - 97.0 FL  
 MCH 33.7 24.0 - 34.0 PG  
 MCHC 30.4 (L) 31.0 - 37.0 g/dL  
 RDW 12.3 11.6 - 14.5 % PLATELET 103 803 - 696 K/uL MPV 9.8 9.2 - 11.8 FL  
TROPONIN I Collection Time: 10/15/20  8:48 AM  
Result Value Ref Range Troponin-I, Qt. 0.09 (H) 0.02 - 0.05 ng/mL Discussed case with Dr. Bridgette Marcelo, and our impression and recommendations are as follows: 1.  Elevated troponins: Troponins peaked at 0.83 and have trended down. She remains free of chest pain. EKGs indicate a left bundle branch block, but this is old and was previously demonstrated on an EKG from 2018. Troponin elevation is due to infectious process from UTI. Echocardiogram obtained this admission indicates preserved EF and no wall motion abnormalities. Will continue metoprolol XL 12.5 mg daily. The patient will need outpatient ischemic evaluation. She will need follow up in 2 weeks with [facility]. 
  
2. Atrial fibrillation: She is currently in sinus rhythm and rate is controlled. Continue amiodarone. Continue Eliquis for anticoagulation. Continue magnesium supplementation. Keep serum potassium between 4-5 and serum magnesium > 2.  
  
3. Hypertension: Blood pressures are normal. Monitor closely. 
  
4.  History of CHF: LLE edema is improving.  
  
Thank you for involving us in the care of this patient. Please do not hesitate to call me or Dr. Keyla Mansfield if additional questions arise.

## 2020-10-15 NOTE — ROUTINE PROCESS
Rec'd care of pt in bed. Pt has a right AKA. purewick in place draining light trina urine. Pt denies pain, just \"soreness\" in her right upper arm. Pt is asking why the hospital does not offer meal choices although she ate 100% of her breakfast. 
 
0930- large watery incontinent episode of diarrhea, brief changed and pt cleaned.

## 2020-10-15 NOTE — PROGRESS NOTES
Problem: Falls - Risk of 
Goal: *Absence of Falls Description: Document Rivera Lipscomb Fall Risk and appropriate interventions in the flowsheet. Outcome: Progressing Towards Goal 
Note: Fall Risk Interventions: 
Mobility Interventions: Utilize walker, cane, or other assistive device Elimination Interventions: Call light in reach Problem: Patient Education: Go to Patient Education Activity Goal: Patient/Family Education Outcome: Progressing Towards Goal 
  
Problem: Pressure Injury - Risk of 
Goal: *Prevention of pressure injury Description: Document Ethan Scale and appropriate interventions in the flowsheet. Outcome: Progressing Towards Goal 
Note: Pressure Injury Interventions: 
  
 
  
 
  
 
  
 
  
 
  
 
  
 
 
 
  
Problem: Patient Education: Go to Patient Education Activity Goal: Patient/Family Education Outcome: Progressing Towards Goal 
  
Problem: Patient Education: Go to Patient Education Activity Goal: Patient/Family Education Outcome: Progressing Towards Goal 
  
Problem: Nutrition Deficit Goal: *Optimize nutritional status Outcome: Progressing Towards Goal

## 2020-10-15 NOTE — PROGRESS NOTES
Patient lives with her  Naomi Webb. She has a W/C, automatic chair, raised toliet seat and a lift chair. She will be returning back home at discharge. No concerns voiced.

## 2020-10-15 NOTE — PROGRESS NOTES
Verbal shift change report given to Liliam Carballo  (oncoming nurse) by Valeriano Doan LPN (offgoing nurse). Report included the following information SBAR and Kardex.

## 2020-10-15 NOTE — PROGRESS NOTES
Problem: Falls - Risk of 
Goal: *Absence of Falls Description: Document Sin Hart Fall Risk and appropriate interventions in the flowsheet. 10/15/2020 1222 by Anoop Martinez LPN Outcome: Resolved/Met Note: Fall Risk Interventions: 
Mobility Interventions: Utilize walker, cane, or other assistive device, Utilize gait belt for transfers/ambulation Elimination Interventions: Call light in reach 
 
  
 
 
10/15/2020 1216 by Anoop Martinez LPN Outcome: Progressing Towards Goal 
Note: Fall Risk Interventions: 
Mobility Interventions: Utilize walker, cane, or other assistive device Elimination Interventions: Call light in reach Problem: Patient Education: Go to Patient Education Activity Goal: Patient/Family Education 10/15/2020 1222 by Anoop Martinez LPN Outcome: Resolved/Met 
10/15/2020 1216 by Anoop Martinez LPN Outcome: Progressing Towards Goal 
  
Problem: Pressure Injury - Risk of 
Goal: *Prevention of pressure injury Description: Document Ethan Scale and appropriate interventions in the flowsheet. 10/15/2020 1222 by Anoop Martinez LPN Outcome: Resolved/Met Note: Pressure Injury Interventions: 
  
 
  
 
  
 
  
 
  
 
  
 
  
 
 
 
10/15/2020 1216 by Anoop Martinez LPN Outcome: Progressing Towards Goal 
Note: Pressure Injury Interventions: 
  
 
  
 
  
 
  
 
  
 
  
 
  
 
 
 
  
Problem: Patient Education: Go to Patient Education Activity Goal: Patient/Family Education 10/15/2020 1222 by Anoop Martinez LPN Outcome: Resolved/Met 
10/15/2020 1216 by Anoop Martinez LPN Outcome: Progressing Towards Goal 
  
Problem: Patient Education: Go to Patient Education Activity Goal: Patient/Family Education 10/15/2020 1222 by Anoop Martinez LPN Outcome: Resolved/Met 
10/15/2020 1216 by Anoop Martinez LPN Outcome: Progressing Towards Goal 
  
Problem: Nutrition Deficit Goal: *Optimize nutritional status 10/15/2020 1222 by Anoop Martinez LPN 
 Outcome: Resolved/Met 
10/15/2020 1216 by Stefan Field LPN Outcome: Progressing Towards Goal

## 2020-10-15 NOTE — DISCHARGE INSTRUCTIONS
Urinary Tract Infection in Women: Care Instructions  Your Care Instructions     A urinary tract infection, or UTI, is a general term for an infection anywhere between the kidneys and the urethra (where urine comes out). Most UTIs are bladder infections. They often cause pain or burning when you urinate. UTIs are caused by bacteria and can be cured with antibiotics. Be sure to complete your treatment so that the infection goes away. Follow-up care is a key part of your treatment and safety. Be sure to make and go to all appointments, and call your doctor if you are having problems. It's also a good idea to know your test results and keep a list of the medicines you take. How can you care for yourself at home? · Take your antibiotics as directed. Do not stop taking them just because you feel better. You need to take the full course of antibiotics. · Drink extra water and other fluids for the next day or two. This may help wash out the bacteria that are causing the infection. (If you have kidney, heart, or liver disease and have to limit fluids, talk with your doctor before you increase your fluid intake.)  · Avoid drinks that are carbonated or have caffeine. They can irritate the bladder. · Urinate often. Try to empty your bladder each time. · To relieve pain, take a hot bath or lay a heating pad set on low over your lower belly or genital area. Never go to sleep with a heating pad in place. To prevent UTIs  · Drink plenty of water each day. This helps you urinate often, which clears bacteria from your system. (If you have kidney, heart, or liver disease and have to limit fluids, talk with your doctor before you increase your fluid intake.)  · Urinate when you need to. · Urinate right after you have sex. · Change sanitary pads often. · Avoid douches, bubble baths, feminine hygiene sprays, and other feminine hygiene products that have deodorants.   · After going to the bathroom, wipe from front to back.  When should you call for help? Call your doctor now or seek immediate medical care if:    · Symptoms such as fever, chills, nausea, or vomiting get worse or appear for the first time.     · You have new pain in your back just below your rib cage. This is called flank pain.     · There is new blood or pus in your urine.     · You have any problems with your antibiotic medicine. Watch closely for changes in your health, and be sure to contact your doctor if:    · You are not getting better after taking an antibiotic for 2 days.     · Your symptoms go away but then come back. Where can you learn more? Go to http://www.gray.com/  Enter C514 in the search box to learn more about \"Urinary Tract Infection in Women: Care Instructions. \"  Current as of: June 29, 2020               Content Version: 12.6  © 1490-2308 [employer]. Care instructions adapted under license by [employer] (which disclaims liability or warranty for this information). If you have questions about a medical condition or this instruction, always ask your healthcare professional. [place] [address] any warranty or liability for your use of this information. Patient Education        Urinary Tract Infection in Women: Care Instructions  Your Care Instructions     A urinary tract infection, or UTI, is a general term for an infection anywhere between the kidneys and the urethra (where urine comes out). Most UTIs are bladder infections. They often cause pain or burning when you urinate. UTIs are caused by bacteria and can be cured with antibiotics. Be sure to complete your treatment so that the infection goes away. Follow-up care is a key part of your treatment and safety. Be sure to make and go to all appointments, and call your doctor if you are having problems. It's also a good idea to know your test results and keep a list of the medicines you take.   How can you care for yourself at home? · Take your antibiotics as directed. Do not stop taking them just because you feel better. You need to take the full course of antibiotics. · Drink extra water and other fluids for the next day or two. This may help wash out the bacteria that are causing the infection. (If you have kidney, heart, or liver disease and have to limit fluids, talk with your doctor before you increase your fluid intake.)  · Avoid drinks that are carbonated or have caffeine. They can irritate the bladder. · Urinate often. Try to empty your bladder each time. · To relieve pain, take a hot bath or lay a heating pad set on low over your lower belly or genital area. Never go to sleep with a heating pad in place. To prevent UTIs  · Drink plenty of water each day. This helps you urinate often, which clears bacteria from your system. (If you have kidney, heart, or liver disease and have to limit fluids, talk with your doctor before you increase your fluid intake.)  · Urinate when you need to. · Urinate right after you have sex. · Change sanitary pads often. · Avoid douches, bubble baths, feminine hygiene sprays, and other feminine hygiene products that have deodorants. · After going to the bathroom, wipe from front to back. When should you call for help? Call your doctor now or seek immediate medical care if:    · Symptoms such as fever, chills, nausea, or vomiting get worse or appear for the first time.     · You have new pain in your back just below your rib cage. This is called flank pain.     · There is new blood or pus in your urine.     · You have any problems with your antibiotic medicine. Watch closely for changes in your health, and be sure to contact your doctor if:    · You are not getting better after taking an antibiotic for 2 days.     · Your symptoms go away but then come back. Where can you learn more?   Go to http://www.gray.com/  Enter [id number] in the search box to learn more about \"Urinary Tract Infection in Women: Care Instructions. \"  Current as of: June 29, 2020               Content Version: 12.6  © 3423-6210 [employer]. Care instructions adapted under license by [employer] (which disclaims liability or warranty for this information). If you have questions about a medical condition or this instruction, always ask your healthcare professional. [address] any warranty or liability for your use of this information.

## 2020-10-15 NOTE — PROGRESS NOTES
Pt discharged in [facility] to [facility], tolerated well.  appt scheduled with Dr Nichol Unger on 11/3/2020 at 10:15

## 2020-10-19 ENCOUNTER — TELEPHONE (OUTPATIENT)
Dept: BEHAVIORAL/MENTAL HEALTH CLINIC | Age: 75
End: 2020-10-19

## 2020-10-19 DIAGNOSIS — F41.1 GENERALIZED ANXIETY DISORDER: Primary | ICD-10-CM

## 2020-10-19 RX ORDER — BUSPIRONE HYDROCHLORIDE 10 MG/1
10 TABLET ORAL 3 TIMES DAILY
Qty: 270 TAB | Refills: 0 | Status: SHIPPED | OUTPATIENT
Start: 2020-10-19 | End: 2021-01-01

## 2020-10-19 NOTE — TELEPHONE ENCOUNTER
Patient reports increasing anxiety. She was recently hospitalized for UTI and heart issues associated with A-fib. Patient is requesting to increase buspar due to increased anxiety.

## 2020-10-27 ENCOUNTER — TELEPHONE (OUTPATIENT)
Dept: BEHAVIORAL/MENTAL HEALTH CLINIC | Age: 75
End: 2020-10-27

## 2020-10-30 DIAGNOSIS — G62.9 NEUROPATHY: ICD-10-CM

## 2020-10-30 RX ORDER — GABAPENTIN 300 MG/1
300 CAPSULE ORAL 3 TIMES DAILY
Qty: 90 CAP | Refills: 0 | Status: SHIPPED | OUTPATIENT
Start: 2020-10-30 | End: 2020-11-30 | Stop reason: SDUPTHER

## 2020-11-04 ENCOUNTER — TRANSCRIBE ORDER (OUTPATIENT)
Dept: SCHEDULING | Age: 75
End: 2020-11-04

## 2020-11-04 DIAGNOSIS — R06.02 SHORTNESS OF BREATH: Primary | ICD-10-CM

## 2020-11-23 ENCOUNTER — TRANSCRIBE ORDER (OUTPATIENT)
Dept: SCHEDULING | Age: 75
End: 2020-11-23

## 2020-11-23 DIAGNOSIS — R06.02 SOB (SHORTNESS OF BREATH): Primary | ICD-10-CM

## 2020-11-25 ENCOUNTER — DOCUMENTATION ONLY (OUTPATIENT)
Dept: INTERNAL MEDICINE CLINIC | Age: 75
End: 2020-11-25

## 2020-11-25 NOTE — PROGRESS NOTES
Patient called in to find out what time her appointment is scheduled for for 11/30. Confirmed with patient that it is a 10 am. Patient voiced understanding.

## 2020-11-30 ENCOUNTER — OFFICE VISIT (OUTPATIENT)
Dept: INTERNAL MEDICINE CLINIC | Age: 75
End: 2020-11-30
Payer: MEDICARE

## 2020-11-30 ENCOUNTER — HOSPITAL ENCOUNTER (OUTPATIENT)
Dept: LAB | Age: 75
Discharge: HOME | End: 2020-11-30

## 2020-11-30 VITALS — TEMPERATURE: 97.7 F | RESPIRATION RATE: 20 BRPM | DIASTOLIC BLOOD PRESSURE: 76 MMHG | SYSTOLIC BLOOD PRESSURE: 128 MMHG

## 2020-11-30 DIAGNOSIS — G62.9 NEUROPATHY: ICD-10-CM

## 2020-11-30 DIAGNOSIS — Z23 NEEDS FLU SHOT: Primary | ICD-10-CM

## 2020-11-30 DIAGNOSIS — I48.0 PAROXYSMAL ATRIAL FIBRILLATION (HCC): ICD-10-CM

## 2020-11-30 DIAGNOSIS — Z98.84 GASTRIC BYPASS STATUS FOR OBESITY: ICD-10-CM

## 2020-11-30 DIAGNOSIS — Z23 ENCOUNTER FOR IMMUNIZATION: ICD-10-CM

## 2020-11-30 DIAGNOSIS — R77.8 ELEVATED TROPONIN: ICD-10-CM

## 2020-11-30 DIAGNOSIS — E03.9 ACQUIRED HYPOTHYROIDISM: ICD-10-CM

## 2020-11-30 PROCEDURE — 99213 OFFICE O/P EST LOW 20 MIN: CPT | Performed by: INTERNAL MEDICINE

## 2020-11-30 PROCEDURE — G0008 ADMIN INFLUENZA VIRUS VAC: HCPCS | Performed by: INTERNAL MEDICINE

## 2020-11-30 PROCEDURE — 90686 IIV4 VACC NO PRSV 0.5 ML IM: CPT | Performed by: INTERNAL MEDICINE

## 2020-11-30 RX ORDER — GABAPENTIN 300 MG/1
300 CAPSULE ORAL 3 TIMES DAILY
Qty: 90 CAP | Refills: 0 | Status: SHIPPED | OUTPATIENT
Start: 2020-11-30 | End: 2020-12-28 | Stop reason: SDUPTHER

## 2020-11-30 NOTE — PROGRESS NOTES
Aggie Monzon is a 77 y.o. female presenting for paroxysmal atrial fibrillation Chief Complaint Patient presents with  Hypertension HPI Jaquan Knott is in after recent hospitalization. She went in for a urinary tract infection but while there was found to have an elevated troponin. This was worked up and eventually they decided to let her go home and schedule her for an outpatient stress test which is scheduled for tomorrow. She has multiple other issues ongoing and is concerned about the stress test throwing her back into atrial fibrillation. I told her that the cardiologist would be there should she need assistance. Otherwise he has not had blood work in quite some time. He recently went to [facility] her old home and her gastroenterologist there was concerned that she may be developing a stricture in the outlet of her Mike-en-Y for obesity when she had bariatric surgery some years ago. She is having early satiety hand significant bloating when she starts to eat. Her gastroenterologist requested that we order an upper GI for her which we can do. Finally she needs some blood work done as well. Past Medical History:  
Diagnosis Date  CHF (congestive heart failure) ([facility] [address]  Chronic neck and back pain  Frequent UTI  Hypercholesteremia  Hypertension  Obese Current Outpatient Medications on File Prior to Visit Medication Sig Dispense Refill  busPIRone (BUSPAR) 10 mg tablet Take 1 Tab by mouth three (3) times daily for 90 days. 270 Tab 0  
 acetaminophen (TYLENOL) 500 mg tablet Take 1 Tab by mouth every six (6) hours as needed for Pain. 60 Tab 1  
 fexofenadine (Allegra Allergy) 180 mg tablet Take 1 Tab by mouth daily.  omega-3 fatty acids (Fish Oil Concentrate) 1,000 mg cap Take 1 Tab by mouth daily.  magnesium oxide (MAG-OX) 400 mg tablet Take 1 Tab by mouth daily.  b complex-vitamin c-folic acid 0.8 mg (Isabel-Seth) 0.8 mg tab tablet Take 1 Tab by mouth daily.  diclofenac (Voltaren) 1 % gel Apply 1 Applicator to affected area as needed.  amiodarone (CORDARONE) 200 mg tablet Take 1 Tab by mouth daily.  Eliquis 2.5 mg tablet Take 1 Tab by mouth daily.  cranberry extract 425 mg cap Take 425 mg by mouth two (2) times a day.  divalproex ER (DEPAKOTE ER) 500 mg ER tablet Take 1,000 mg by mouth every twelve (12) hours.  fluticasone propionate (FLONASE) 50 mcg/actuation nasal spray 2 Sprays by Both Nostrils route daily.  Isabel-Seth 0.8 mg tab tablet Take 1 Tab by mouth daily.  ibuprofen (MOTRIN) 200 mg tablet Take 400 mg by mouth daily as needed.  levothyroxine (SYNTHROID) 200 mcg tablet Take 1 Tab by mouth daily.  lisinopriL (PRINIVIL, ZESTRIL) 10 mg tablet Take 1 Tab by mouth daily.  ferrous sulfate 325 mg (65 mg iron) tablet TAKE 1 TABLET EVERY DAY 90 Tab 2  
 furosemide (LASIX) 20 mg tablet Take 2 Tabs by mouth daily. 180 Tab 3  
 baclofen (LIORESAL) 10 mg tablet TAKE 1 TABLET THREE TIMES DAILY 270 Tab 2  
 QUEtiapine (SEROquel) 50 mg tablet TAKE 1 TABLET BY MOUTH EVERYDAY AT BEDTIME 90 Tab 1  [DISCONTINUED] gabapentin (NEURONTIN) 300 mg capsule TAKE 1 CAP BY MOUTH THREE (3) TIMES DAILY. MAX DAILY AMOUNT: 900 MG. INDICATIONS: NEUROPATHIC PAIN 90 Cap 0 No current facility-administered medications on file prior to visit. ROS generally feels well except as noted in the history of present illness Visit Vitals /76 (BP 1 Location: Left arm, BP Patient Position: Sitting) Temp 97.7 °F (36.5 °C) Resp 20 Physical Exam obese  woman seated in wheelchair previous AKA amputation on the right ENT unremarkable neck no lymphadenopathy or mass no thyromegaly lungs bilaterally clear to auscultation heart regular rhythm she sounds like she is in sinus at present trace ankle edema bilaterally Assessment & Plan Tre Ping needs x-rays and blood work and we will see her routinely. She needs her flu shot today.

## 2020-12-01 ENCOUNTER — HOSPITAL ENCOUNTER (OUTPATIENT)
Dept: NUCLEAR MEDICINE | Age: 75
Discharge: HOME | End: 2020-12-01
Attending: NURSE PRACTITIONER
Payer: MEDICARE

## 2020-12-01 ENCOUNTER — HOSPITAL ENCOUNTER (OUTPATIENT)
Dept: NON INVASIVE DIAGNOSTICS | Age: 75
Discharge: HOME | End: 2020-12-01
Attending: NURSE PRACTITIONER
Payer: MEDICARE

## 2020-12-01 DIAGNOSIS — R06.02 SOB (SHORTNESS OF BREATH): ICD-10-CM

## 2020-12-01 LAB
STRESS BASELINE DIAS BP: 64 MMHG
STRESS BASELINE HR: 70 BPM
STRESS BASELINE SYS BP: 140 MMHG
STRESS PEAK DIAS BP: 64 MMHG
STRESS PEAK SYS BP: 140 MMHG
STRESS PERCENT HR ACHIEVED: 58 %
STRESS POST PEAK HR: 85 BPM
STRESS RATE PRESSURE PRODUCT: NORMAL BPM*MMHG
STRESS ST DEPRESSION: 0 MM
STRESS ST ELEVATION: 0 MM
STRESS STAGE 1 DURATION: 1 MIN:SEC
STRESS STAGE 1 HR: 76 BPM
STRESS STAGE 2 BP: NORMAL MMHG
STRESS STAGE 2 DURATION: 1 MIN:SEC
STRESS STAGE 2 HR: 77 BPM
STRESS STAGE 3 BP: NORMAL MMHG
STRESS STAGE 3 DURATION: 1 MIN:SEC
STRESS STAGE 3 HR: 76 BPM
STRESS STAGE 4 DURATION: 1 MIN:SEC
STRESS STAGE 4 HR: 75 BPM
STRESS STAGE 5 BP: NORMAL MMHG
STRESS STAGE 5 DURATION: 1 MIN:SEC
STRESS STAGE 5 HR: 76 BPM
STRESS STAGE RECOVERY 1 BP: NORMAL MMHG
STRESS STAGE RECOVERY 1 DURATION: 1 MIN:SEC
STRESS STAGE RECOVERY 1 HR: 76 BPM
STRESS TARGET HR: 146 BPM

## 2020-12-01 PROCEDURE — 93017 CV STRESS TEST TRACING ONLY: CPT

## 2020-12-01 PROCEDURE — 74011250636 HC RX REV CODE- 250/636: Performed by: NURSE PRACTITIONER

## 2020-12-01 PROCEDURE — A9500 TC99M SESTAMIBI: HCPCS

## 2020-12-01 RX ADMIN — REGADENOSON 0.4 MG: 0.08 INJECTION, SOLUTION INTRAVENOUS at 12:39

## 2020-12-07 DIAGNOSIS — F31.9 BIPOLAR DISORDER, UNSPECIFIED (HCC): ICD-10-CM

## 2020-12-07 RX ORDER — QUETIAPINE FUMARATE 50 MG/1
TABLET, FILM COATED ORAL
Qty: 90 TAB | Refills: 1 | Status: SHIPPED | OUTPATIENT
Start: 2020-12-07 | End: 2021-01-01

## 2020-12-08 LAB
ALBUMIN SERPL-MCNC: 3.7 G/DL (ref 3.7–4.7)
ALBUMIN/GLOB SERPL: 1.5 {RATIO} (ref 1.2–2.2)
ALP SERPL-CCNC: 109 IU/L (ref 39–117)
ALT SERPL-CCNC: 11 IU/L (ref 0–32)
AST SERPL-CCNC: 15 IU/L (ref 0–40)
BILIRUB SERPL-MCNC: 0.2 MG/DL (ref 0–1.2)
BUN SERPL-MCNC: 36 MG/DL (ref 8–27)
BUN/CREAT SERPL: 22 (ref 12–28)
CALCIUM SERPL-MCNC: 8.8 MG/DL (ref 8.7–10.3)
CHLORIDE SERPL-SCNC: 108 MMOL/L (ref 96–106)
CO2 SERPL-SCNC: 22 MMOL/L (ref 20–29)
CREAT SERPL-MCNC: 1.66 MG/DL (ref 0.57–1)
GLOBULIN SER CALC-MCNC: 2.5 G/DL (ref 1.5–4.5)
GLUCOSE SERPL-MCNC: 76 MG/DL (ref 65–99)
POTASSIUM SERPL-SCNC: 4.5 MMOL/L (ref 3.5–5.2)
PROT SERPL-MCNC: 6.2 G/DL (ref 6–8.5)
SODIUM SERPL-SCNC: 145 MMOL/L (ref 134–144)
T4 SERPL-MCNC: 9.3 UG/DL (ref 4.5–12)
TSH SERPL DL<=0.005 MIU/L-ACNC: 7.88 UIU/ML (ref 0.45–4.5)
VALPROATE SERPL-MCNC: 45 UG/ML (ref 50–100)

## 2020-12-28 DIAGNOSIS — G62.9 NEUROPATHY: ICD-10-CM

## 2020-12-28 RX ORDER — GABAPENTIN 300 MG/1
300 CAPSULE ORAL 3 TIMES DAILY
Qty: 90 CAP | Refills: 0 | Status: SHIPPED | OUTPATIENT
Start: 2020-12-28 | End: 2021-01-01

## 2021-01-01 ENCOUNTER — HOSPITAL ENCOUNTER (OUTPATIENT)
Dept: LAB | Age: 76
Discharge: HOME | End: 2021-03-24

## 2021-01-01 ENCOUNTER — HOSPITAL ENCOUNTER (INPATIENT)
Age: 76
LOS: 3 days | Discharge: NURSING, REHAB OR LONG-TERM CARE | DRG: 563 | End: 2021-07-14
Attending: EMERGENCY MEDICINE | Admitting: INTERNAL MEDICINE
Payer: MEDICARE

## 2021-01-01 ENCOUNTER — OFFICE VISIT (OUTPATIENT)
Dept: BEHAVIORAL/MENTAL HEALTH CLINIC | Age: 76
End: 2021-01-01
Payer: MEDICARE

## 2021-01-01 ENCOUNTER — TELEPHONE (OUTPATIENT)
Dept: BEHAVIORAL/MENTAL HEALTH CLINIC | Age: 76
End: 2021-01-01

## 2021-01-01 ENCOUNTER — VIRTUAL VISIT (OUTPATIENT)
Dept: BEHAVIORAL/MENTAL HEALTH CLINIC | Age: 76
End: 2021-01-01
Payer: MEDICARE

## 2021-01-01 ENCOUNTER — APPOINTMENT (OUTPATIENT)
Dept: GENERAL RADIOLOGY | Age: 76
DRG: 563 | End: 2021-01-01
Attending: EMERGENCY MEDICINE
Payer: MEDICARE

## 2021-01-01 ENCOUNTER — TELEPHONE (OUTPATIENT)
Dept: PRIMARY CARE CLINIC | Age: 76
End: 2021-01-01

## 2021-01-01 ENCOUNTER — HOSPITAL ENCOUNTER (INPATIENT)
Age: 76
LOS: 1 days | Discharge: HOME | DRG: 683 | End: 2021-06-24
Attending: FAMILY MEDICINE | Admitting: INTERNAL MEDICINE
Payer: MEDICARE

## 2021-01-01 ENCOUNTER — OFFICE VISIT (OUTPATIENT)
Dept: ORTHOPEDIC SURGERY | Age: 76
End: 2021-01-01
Payer: MEDICARE

## 2021-01-01 ENCOUNTER — OFFICE VISIT (OUTPATIENT)
Dept: INTERNAL MEDICINE CLINIC | Age: 76
End: 2021-01-01
Payer: MEDICARE

## 2021-01-01 ENCOUNTER — PATIENT OUTREACH (OUTPATIENT)
Dept: CASE MANAGEMENT | Age: 76
End: 2021-01-01

## 2021-01-01 ENCOUNTER — TELEPHONE (OUTPATIENT)
Dept: INTERNAL MEDICINE CLINIC | Age: 76
End: 2021-01-01

## 2021-01-01 ENCOUNTER — HOSPITAL ENCOUNTER (OUTPATIENT)
Dept: GENERAL RADIOLOGY | Age: 76
Discharge: HOME | End: 2021-01-15
Attending: INTERNAL MEDICINE
Payer: MEDICARE

## 2021-01-01 ENCOUNTER — TELEPHONE (OUTPATIENT)
Dept: ORTHOPEDIC SURGERY | Age: 76
End: 2021-01-01

## 2021-01-01 ENCOUNTER — HOSPITAL ENCOUNTER (EMERGENCY)
Age: 76
Discharge: HOME | End: 2021-12-30
Attending: EMERGENCY MEDICINE
Payer: MEDICARE

## 2021-01-01 ENCOUNTER — APPOINTMENT (OUTPATIENT)
Dept: GENERAL RADIOLOGY | Age: 76
End: 2021-01-01
Attending: INTERNAL MEDICINE
Payer: MEDICARE

## 2021-01-01 ENCOUNTER — APPOINTMENT (OUTPATIENT)
Dept: GENERAL RADIOLOGY | Age: 76
End: 2021-01-01
Attending: EMERGENCY MEDICINE
Payer: MEDICARE

## 2021-01-01 ENCOUNTER — APPOINTMENT (OUTPATIENT)
Dept: GENERAL RADIOLOGY | Age: 76
DRG: 563 | End: 2021-01-01
Attending: INTERNAL MEDICINE
Payer: MEDICARE

## 2021-01-01 ENCOUNTER — HOSPITAL ENCOUNTER (EMERGENCY)
Age: 76
Discharge: HOME | End: 2021-12-23
Attending: INTERNAL MEDICINE
Payer: MEDICARE

## 2021-01-01 ENCOUNTER — APPOINTMENT (OUTPATIENT)
Dept: CT IMAGING | Age: 76
DRG: 563 | End: 2021-01-01
Attending: EMERGENCY MEDICINE
Payer: MEDICARE

## 2021-01-01 VITALS — RESPIRATION RATE: 20 BRPM | SYSTOLIC BLOOD PRESSURE: 135 MMHG | DIASTOLIC BLOOD PRESSURE: 80 MMHG

## 2021-01-01 VITALS
DIASTOLIC BLOOD PRESSURE: 66 MMHG | HEIGHT: 69 IN | TEMPERATURE: 98.8 F | RESPIRATION RATE: 18 BRPM | BODY MASS INDEX: 36.88 KG/M2 | HEART RATE: 87 BPM | WEIGHT: 249 LBS | SYSTOLIC BLOOD PRESSURE: 151 MMHG | OXYGEN SATURATION: 100 %

## 2021-01-01 VITALS
HEART RATE: 71 BPM | TEMPERATURE: 98.5 F | SYSTOLIC BLOOD PRESSURE: 146 MMHG | WEIGHT: 249 LBS | BODY MASS INDEX: 36.88 KG/M2 | DIASTOLIC BLOOD PRESSURE: 66 MMHG | RESPIRATION RATE: 20 BRPM | HEIGHT: 69 IN | OXYGEN SATURATION: 94 %

## 2021-01-01 VITALS
TEMPERATURE: 98.4 F | BODY MASS INDEX: 29.62 KG/M2 | DIASTOLIC BLOOD PRESSURE: 60 MMHG | WEIGHT: 194.8 LBS | SYSTOLIC BLOOD PRESSURE: 110 MMHG

## 2021-01-01 VITALS
HEIGHT: 69 IN | OXYGEN SATURATION: 95 % | HEART RATE: 55 BPM | WEIGHT: 260 LBS | SYSTOLIC BLOOD PRESSURE: 132 MMHG | TEMPERATURE: 97.5 F | BODY MASS INDEX: 38.51 KG/M2 | DIASTOLIC BLOOD PRESSURE: 61 MMHG | RESPIRATION RATE: 16 BRPM

## 2021-01-01 VITALS
DIASTOLIC BLOOD PRESSURE: 53 MMHG | SYSTOLIC BLOOD PRESSURE: 128 MMHG | WEIGHT: 271.8 LBS | OXYGEN SATURATION: 96 % | HEIGHT: 69 IN | HEART RATE: 68 BPM | BODY MASS INDEX: 40.26 KG/M2 | RESPIRATION RATE: 17 BRPM | TEMPERATURE: 97 F

## 2021-01-01 VITALS — SYSTOLIC BLOOD PRESSURE: 124 MMHG | RESPIRATION RATE: 20 BRPM | DIASTOLIC BLOOD PRESSURE: 76 MMHG | TEMPERATURE: 97.4 F

## 2021-01-01 VITALS — BODY MASS INDEX: 39.53 KG/M2 | WEIGHT: 260 LBS

## 2021-01-01 VITALS — DIASTOLIC BLOOD PRESSURE: 70 MMHG | SYSTOLIC BLOOD PRESSURE: 110 MMHG | RESPIRATION RATE: 20 BRPM

## 2021-01-01 VITALS — RESPIRATION RATE: 20 BRPM | DIASTOLIC BLOOD PRESSURE: 88 MMHG | SYSTOLIC BLOOD PRESSURE: 138 MMHG

## 2021-01-01 DIAGNOSIS — F25.0 SCHIZOAFFECTIVE DISORDER, BIPOLAR TYPE (HCC): Primary | ICD-10-CM

## 2021-01-01 DIAGNOSIS — F41.1 GENERALIZED ANXIETY DISORDER: Primary | ICD-10-CM

## 2021-01-01 DIAGNOSIS — J41.1 MUCOPURULENT CHRONIC BRONCHITIS (HCC): Primary | ICD-10-CM

## 2021-01-01 DIAGNOSIS — F41.1 GENERALIZED ANXIETY DISORDER: ICD-10-CM

## 2021-01-01 DIAGNOSIS — M25.512 ACUTE PAIN OF LEFT SHOULDER: Primary | ICD-10-CM

## 2021-01-01 DIAGNOSIS — U07.1 COVID: ICD-10-CM

## 2021-01-01 DIAGNOSIS — G47.00 INSOMNIA, UNSPECIFIED TYPE: Primary | ICD-10-CM

## 2021-01-01 DIAGNOSIS — G62.9 NEUROPATHY: Primary | ICD-10-CM

## 2021-01-01 DIAGNOSIS — G47.00 INSOMNIA, UNSPECIFIED TYPE: ICD-10-CM

## 2021-01-01 DIAGNOSIS — N39.0 URINARY TRACT INFECTION WITH HEMATURIA, SITE UNSPECIFIED: Primary | ICD-10-CM

## 2021-01-01 DIAGNOSIS — G62.9 NEUROPATHY: ICD-10-CM

## 2021-01-01 DIAGNOSIS — S42.225A CLOSED 2-PART NONDISPLACED FRACTURE OF SURGICAL NECK OF LEFT HUMERUS, INITIAL ENCOUNTER: ICD-10-CM

## 2021-01-01 DIAGNOSIS — M19.012 PRIMARY OSTEOARTHRITIS OF LEFT SHOULDER: Primary | ICD-10-CM

## 2021-01-01 DIAGNOSIS — G89.29 CHRONIC RIGHT SHOULDER PAIN: ICD-10-CM

## 2021-01-01 DIAGNOSIS — M25.511 CHRONIC RIGHT SHOULDER PAIN: ICD-10-CM

## 2021-01-01 DIAGNOSIS — E44.0 PROTEIN-CALORIE MALNUTRITION, MODERATE (HCC): ICD-10-CM

## 2021-01-01 DIAGNOSIS — D64.9 SEVERE ANEMIA: Primary | ICD-10-CM

## 2021-01-01 DIAGNOSIS — I10 ESSENTIAL (PRIMARY) HYPERTENSION: Primary | ICD-10-CM

## 2021-01-01 DIAGNOSIS — R39.9 SYMPTOMS OF URINARY TRACT INFECTION: ICD-10-CM

## 2021-01-01 DIAGNOSIS — R25.1 TREMOR: ICD-10-CM

## 2021-01-01 DIAGNOSIS — N30.00 ACUTE CYSTITIS WITHOUT HEMATURIA: Primary | ICD-10-CM

## 2021-01-01 DIAGNOSIS — I10 ESSENTIAL (PRIMARY) HYPERTENSION: ICD-10-CM

## 2021-01-01 DIAGNOSIS — R60.9 EDEMA, UNSPECIFIED TYPE: Primary | ICD-10-CM

## 2021-01-01 DIAGNOSIS — Z51.81 MEDICATION MONITORING ENCOUNTER: Primary | ICD-10-CM

## 2021-01-01 DIAGNOSIS — N17.9 ACUTE RENAL FAILURE, UNSPECIFIED ACUTE RENAL FAILURE TYPE (HCC): ICD-10-CM

## 2021-01-01 DIAGNOSIS — E86.0 DEHYDRATION: ICD-10-CM

## 2021-01-01 DIAGNOSIS — N39.0 URINARY TRACT INFECTION WITHOUT HEMATURIA, SITE UNSPECIFIED: ICD-10-CM

## 2021-01-01 DIAGNOSIS — R25.1 TREMORS OF NERVOUS SYSTEM: ICD-10-CM

## 2021-01-01 DIAGNOSIS — Z51.81 MEDICATION MONITORING ENCOUNTER: ICD-10-CM

## 2021-01-01 DIAGNOSIS — R25.1 TREMOR: Primary | ICD-10-CM

## 2021-01-01 DIAGNOSIS — F33.0 MILD EPISODE OF RECURRENT MAJOR DEPRESSIVE DISORDER (HCC): ICD-10-CM

## 2021-01-01 DIAGNOSIS — F31.9 BIPOLAR I DISORDER (HCC): Primary | ICD-10-CM

## 2021-01-01 DIAGNOSIS — I48.0 PAROXYSMAL ATRIAL FIBRILLATION (HCC): Primary | ICD-10-CM

## 2021-01-01 DIAGNOSIS — R60.9 EDEMA, UNSPECIFIED TYPE: ICD-10-CM

## 2021-01-01 DIAGNOSIS — B35.4: Primary | ICD-10-CM

## 2021-01-01 DIAGNOSIS — G54.6 PHANTOM LIMB PAIN (HCC): ICD-10-CM

## 2021-01-01 DIAGNOSIS — R31.9 URINARY TRACT INFECTION WITH HEMATURIA, SITE UNSPECIFIED: Primary | ICD-10-CM

## 2021-01-01 LAB
ALBUMIN SERPL-MCNC: 1.8 G/DL (ref 3.5–4.7)
ALBUMIN SERPL-MCNC: 3.4 G/DL (ref 3.5–4.7)
ALBUMIN/GLOB SERPL: 0.6 {RATIO}
ALBUMIN/GLOB SERPL: 1.1 {RATIO}
ALP SERPL-CCNC: 147 U/L (ref 38–126)
ALP SERPL-CCNC: 65 U/L (ref 38–126)
ALT SERPL-CCNC: 18 U/L (ref 3–52)
ALT SERPL-CCNC: 25 U/L (ref 3–52)
ANION GAP SERPL CALC-SCNC: 10 MMOL/L
ANION GAP SERPL CALC-SCNC: 10 MMOL/L
ANION GAP SERPL CALC-SCNC: 11 MMOL/L
ANION GAP SERPL CALC-SCNC: 14 MMOL/L
ANION GAP SERPL CALC-SCNC: 8 MMOL/L
ANION GAP SERPL CALC-SCNC: 8 MMOL/L
ANION GAP SERPL CALC-SCNC: 9 MMOL/L
APPEARANCE UR: ABNORMAL
AST SERPL W P-5'-P-CCNC: 20 U/L (ref 14–74)
AST SERPL W P-5'-P-CCNC: 60 U/L (ref 14–74)
ATRIAL RATE: 63 BPM
BACTERIA SPEC CULT: ABNORMAL
BACTERIA SPEC CULT: NORMAL
BACTERIA URNS QL MICRO: ABNORMAL /HPF
BASOPHILS # BLD: 0 K/UL (ref 0–0.1)
BASOPHILS NFR BLD: 0 % (ref 0–2)
BILIRUB SERPL-MCNC: 0.6 MG/DL (ref 0.2–1)
BILIRUB SERPL-MCNC: 1.1 MG/DL (ref 0.2–1)
BILIRUB UR QL: ABNORMAL
BILIRUB UR QL: NEGATIVE
BUN SERPL-MCNC: 16 MG/DL (ref 9–21)
BUN SERPL-MCNC: 40 MG/DL (ref 9–21)
BUN SERPL-MCNC: 41 MG/DL (ref 9–21)
BUN SERPL-MCNC: 47 MG/DL (ref 9–21)
BUN SERPL-MCNC: 47 MG/DL (ref 9–21)
BUN SERPL-MCNC: 48 MG/DL (ref 8–27)
BUN SERPL-MCNC: 60 MG/DL (ref 9–21)
BUN SERPL-MCNC: 75 MG/DL (ref 9–21)
BUN/CREAT SERPL: 16
BUN/CREAT SERPL: 23
BUN/CREAT SERPL: 26
BUN/CREAT SERPL: 27
BUN/CREAT SERPL: 29
BUN/CREAT SERPL: 33
BUN/CREAT SERPL: 34 (ref 12–28)
BUN/CREAT SERPL: 39
CA-I BLD-MCNC: 8 MG/DL (ref 8.5–10.5)
CA-I BLD-MCNC: 8.3 MG/DL (ref 8.5–10.5)
CA-I BLD-MCNC: 8.3 MG/DL (ref 8.5–10.5)
CA-I BLD-MCNC: 8.5 MG/DL (ref 8.5–10.5)
CA-I BLD-MCNC: 8.5 MG/DL (ref 8.5–10.5)
CA-I BLD-MCNC: 8.6 MG/DL (ref 8.5–10.5)
CA-I BLD-MCNC: 8.7 MG/DL (ref 8.5–10.5)
CALCIUM SERPL-MCNC: 8.8 MG/DL (ref 8.7–10.3)
CALCULATED P AXIS, ECG09: 42 DEGREES
CALCULATED R AXIS, ECG10: -39 DEGREES
CALCULATED T AXIS, ECG11: 58 DEGREES
CHLORIDE SERPL-SCNC: 101 MMOL/L (ref 94–111)
CHLORIDE SERPL-SCNC: 105 MMOL/L (ref 94–111)
CHLORIDE SERPL-SCNC: 107 MMOL/L (ref 96–106)
CHLORIDE SERPL-SCNC: 109 MMOL/L (ref 94–111)
CHLORIDE SERPL-SCNC: 96 MMOL/L (ref 94–111)
CHLORIDE SERPL-SCNC: 98 MMOL/L (ref 94–111)
CO2 SERPL-SCNC: 23 MMOL/L (ref 20–29)
CO2 SERPL-SCNC: 23 MMOL/L (ref 21–33)
CO2 SERPL-SCNC: 24 MMOL/L (ref 21–33)
CO2 SERPL-SCNC: 25 MMOL/L (ref 21–33)
CO2 SERPL-SCNC: 26 MMOL/L (ref 21–33)
CO2 SERPL-SCNC: 27 MMOL/L (ref 21–33)
CO2 SERPL-SCNC: 27 MMOL/L (ref 21–33)
CO2 SERPL-SCNC: 28 MMOL/L (ref 21–33)
COLONY COUNT,CNT: ABNORMAL
COLONY COUNT,CNT: ABNORMAL
COLONY COUNT,CNT: NORMAL
COLOR UR: ABNORMAL
COLOR UR: ABNORMAL
COLOR UR: YELLOW
COLOR UR: YELLOW
COVID-19 RAPID TEST, COVR: NOT DETECTED
CREAT SERPL-MCNC: 1 MG/DL (ref 0.7–1.2)
CREAT SERPL-MCNC: 1.2 MG/DL (ref 0.7–1.2)
CREAT SERPL-MCNC: 1.4 MG/DL (ref 0.7–1.2)
CREAT SERPL-MCNC: 1.43 MG/DL (ref 0.57–1)
CREAT SERPL-MCNC: 1.5 MG/DL (ref 0.7–1.2)
CREAT SERPL-MCNC: 1.8 MG/DL (ref 0.7–1.2)
CREAT SERPL-MCNC: 1.8 MG/DL (ref 0.7–1.2)
CREAT SERPL-MCNC: 3.2 MG/DL (ref 0.7–1.2)
DIAGNOSIS, 93000: NORMAL
DIFFERENTIAL METHOD BLD: ABNORMAL
DIFFERENTIAL METHOD BLD: ABNORMAL
EOSINOPHIL # BLD: 0.1 K/UL (ref 0–0.4)
EOSINOPHIL NFR BLD: 1 % (ref 0–5)
EOSINOPHIL NFR BLD: 2 % (ref 0–5)
EOSINOPHIL NFR BLD: 3 % (ref 0–5)
EPITH CASTS URNS QL MICRO: ABNORMAL /LPF (ref 0–20)
ERYTHROCYTE [DISTWIDTH] IN BLOOD BY AUTOMATED COUNT: 12.1 % (ref 11.6–14.5)
ERYTHROCYTE [DISTWIDTH] IN BLOOD BY AUTOMATED COUNT: 12.1 % (ref 11.6–14.5)
ERYTHROCYTE [DISTWIDTH] IN BLOOD BY AUTOMATED COUNT: 12.4 % (ref 11.6–14.5)
ERYTHROCYTE [DISTWIDTH] IN BLOOD BY AUTOMATED COUNT: 12.4 % (ref 11.6–14.5)
ERYTHROCYTE [DISTWIDTH] IN BLOOD BY AUTOMATED COUNT: 12.6 % (ref 11.6–14.5)
ERYTHROCYTE [DISTWIDTH] IN BLOOD BY AUTOMATED COUNT: 12.6 % (ref 11.6–14.5)
ERYTHROCYTE [DISTWIDTH] IN BLOOD BY AUTOMATED COUNT: 12.8 % (ref 11.6–14.5)
ERYTHROCYTE [DISTWIDTH] IN BLOOD BY AUTOMATED COUNT: 17 % (ref 11.6–14.5)
GLOBULIN SER CALC-MCNC: 3 G/DL
GLOBULIN SER CALC-MCNC: 3.1 G/DL
GLUCOSE BLD STRIP.AUTO-MCNC: 199 MG/DL (ref 70–110)
GLUCOSE BLD STRIP.AUTO-MCNC: 90 MG/DL (ref 70–110)
GLUCOSE SERPL-MCNC: 100 MG/DL (ref 70–110)
GLUCOSE SERPL-MCNC: 100 MG/DL (ref 70–110)
GLUCOSE SERPL-MCNC: 101 MG/DL (ref 70–110)
GLUCOSE SERPL-MCNC: 115 MG/DL (ref 70–110)
GLUCOSE SERPL-MCNC: 87 MG/DL (ref 65–99)
GLUCOSE SERPL-MCNC: 88 MG/DL (ref 70–110)
GLUCOSE SERPL-MCNC: 93 MG/DL (ref 70–110)
GLUCOSE SERPL-MCNC: 95 MG/DL (ref 70–110)
GLUCOSE UR STRIP.AUTO-MCNC: NEGATIVE MG/DL
HCT VFR BLD AUTO: 33.1 % (ref 35–45)
HCT VFR BLD AUTO: 34.5 % (ref 35–45)
HCT VFR BLD AUTO: 35.1 % (ref 35–45)
HCT VFR BLD AUTO: 35.7 % (ref 35–45)
HCT VFR BLD AUTO: 36.2 % (ref 35–45)
HCT VFR BLD AUTO: 36.3 % (ref 35–45)
HCT VFR BLD AUTO: 36.5 % (ref 35–45)
HCT VFR BLD AUTO: 40.2 % (ref 35–45)
HGB BLD-MCNC: 10.3 G/DL (ref 12–16)
HGB BLD-MCNC: 10.6 G/DL (ref 12–16)
HGB BLD-MCNC: 10.8 G/DL (ref 12–16)
HGB BLD-MCNC: 10.8 G/DL (ref 12–16)
HGB BLD-MCNC: 11 G/DL (ref 12–16)
HGB BLD-MCNC: 11.1 G/DL (ref 12–16)
HGB BLD-MCNC: 11.1 G/DL (ref 12–16)
HGB BLD-MCNC: 12.2 G/DL (ref 12–16)
HGB UR QL STRIP: ABNORMAL
IMM GRANULOCYTES # BLD AUTO: 0 K/UL
IMM GRANULOCYTES # BLD AUTO: 0.1 K/UL (ref 0–0.04)
IMM GRANULOCYTES NFR BLD AUTO: 0 %
IMM GRANULOCYTES NFR BLD AUTO: 1 % (ref 0–0.5)
KETONES UR QL STRIP.AUTO: ABNORMAL MG/DL
KETONES UR QL STRIP.AUTO: NEGATIVE MG/DL
LEUKOCYTE ESTERASE UR QL STRIP.AUTO: ABNORMAL
LIPASE SERPL-CCNC: 39 U/L (ref 10–57)
LYMPHOCYTES # BLD: 1.5 K/UL (ref 0.9–3.6)
LYMPHOCYTES # BLD: 1.5 K/UL (ref 0.9–3.6)
LYMPHOCYTES # BLD: 1.6 K/UL (ref 0.9–3.6)
LYMPHOCYTES # BLD: 2 K/UL (ref 0.9–3.6)
LYMPHOCYTES # BLD: 2.1 K/UL (ref 0.9–3.6)
LYMPHOCYTES NFR BLD: 21 % (ref 21–52)
LYMPHOCYTES NFR BLD: 28 % (ref 21–52)
LYMPHOCYTES NFR BLD: 28 % (ref 21–52)
LYMPHOCYTES NFR BLD: 31 % (ref 21–52)
LYMPHOCYTES NFR BLD: 31 % (ref 21–52)
MAGNESIUM SERPL-MCNC: 1.9 MG/DL (ref 1.7–2.8)
MAGNESIUM SERPL-MCNC: 2 MG/DL (ref 1.7–2.8)
MAGNESIUM SERPL-MCNC: 2.2 MG/DL (ref 1.7–2.8)
MAGNESIUM SERPL-MCNC: 2.2 MG/DL (ref 1.7–2.8)
MAGNESIUM SERPL-MCNC: 2.3 MG/DL (ref 1.7–2.8)
MAGNESIUM SERPL-MCNC: 2.3 MG/DL (ref 1.7–2.8)
MCH RBC QN AUTO: 34.3 PG (ref 24–34)
MCH RBC QN AUTO: 34.4 PG (ref 24–34)
MCH RBC QN AUTO: 34.5 PG (ref 24–34)
MCH RBC QN AUTO: 34.5 PG (ref 24–34)
MCH RBC QN AUTO: 34.6 PG (ref 24–34)
MCH RBC QN AUTO: 34.9 PG (ref 24–34)
MCH RBC QN AUTO: 35.2 PG (ref 24–34)
MCH RBC QN AUTO: 35.3 PG (ref 24–34)
MCHC RBC AUTO-ENTMCNC: 30.3 G/DL (ref 31–37)
MCHC RBC AUTO-ENTMCNC: 30.3 G/DL (ref 31–37)
MCHC RBC AUTO-ENTMCNC: 30.4 G/DL (ref 31–37)
MCHC RBC AUTO-ENTMCNC: 30.4 G/DL (ref 31–37)
MCHC RBC AUTO-ENTMCNC: 30.6 G/DL (ref 31–37)
MCHC RBC AUTO-ENTMCNC: 30.7 G/DL (ref 31–37)
MCHC RBC AUTO-ENTMCNC: 30.8 G/DL (ref 31–37)
MCHC RBC AUTO-ENTMCNC: 31.1 G/DL (ref 31–37)
MCV RBC AUTO: 112.1 FL (ref 74–97)
MCV RBC AUTO: 112.8 FL (ref 74–97)
MCV RBC AUTO: 113 FL (ref 78–100)
MCV RBC AUTO: 113.4 FL (ref 74–97)
MCV RBC AUTO: 113.7 FL (ref 74–97)
MCV RBC AUTO: 113.9 FL (ref 74–97)
MCV RBC AUTO: 114.2 FL (ref 74–97)
MCV RBC AUTO: 115 FL (ref 74–97)
MONOCYTES # BLD: 0.6 K/UL (ref 0.05–1.2)
MONOCYTES # BLD: 0.7 K/UL (ref 0.05–1.2)
MONOCYTES # BLD: 0.7 K/UL (ref 0.05–1.2)
MONOCYTES # BLD: 0.8 K/UL (ref 0.05–1.2)
MONOCYTES # BLD: 1.1 K/UL (ref 0.05–1.2)
MONOCYTES NFR BLD: 10 % (ref 3–10)
MONOCYTES NFR BLD: 13 % (ref 3–10)
MONOCYTES NFR BLD: 14 % (ref 3–10)
MONOCYTES NFR BLD: 14 % (ref 3–10)
MONOCYTES NFR BLD: 9 % (ref 3–10)
MUCOUS THREADS URNS QL MICRO: ABNORMAL /LPF
NEUTS SEG # BLD: 2.6 K/UL (ref 1.8–8)
NEUTS SEG # BLD: 2.8 K/UL (ref 1.8–8)
NEUTS SEG # BLD: 3.3 K/UL (ref 1.8–8)
NEUTS SEG # BLD: 4.3 K/UL (ref 1.8–8)
NEUTS SEG # BLD: 6.4 K/UL (ref 1.8–8)
NEUTS SEG NFR BLD: 52 % (ref 40–73)
NEUTS SEG NFR BLD: 54 % (ref 40–73)
NEUTS SEG NFR BLD: 57 % (ref 40–73)
NEUTS SEG NFR BLD: 60 % (ref 40–73)
NEUTS SEG NFR BLD: 69 % (ref 40–73)
NITRITE UR QL STRIP.AUTO: NEGATIVE
NITRITE UR QL STRIP.AUTO: POSITIVE
NRBC # BLD: 0 K/UL (ref 0–0.01)
NRBC BLD-RTO: 0 PER 100 WBC
P-R INTERVAL, ECG05: 209 MS
PERFORMED BY, TECHID: ABNORMAL
PERFORMED BY, TECHID: NORMAL
PH UR STRIP: 5 [PH] (ref 5–8)
PH UR STRIP: 5.5 [PH] (ref 5–8)
PH UR STRIP: 6 [PH] (ref 5–8)
PH UR STRIP: 6 [PH] (ref 5–9)
PHOSPHATE SERPL-MCNC: 3.4 MG/DL (ref 2.5–4.5)
PHOSPHATE SERPL-MCNC: 3.8 MG/DL (ref 2.5–4.5)
PLATELET # BLD AUTO: 170 K/UL (ref 135–420)
PLATELET # BLD AUTO: 186 K/UL (ref 135–420)
PLATELET # BLD AUTO: 194 K/UL (ref 135–420)
PLATELET # BLD AUTO: 196 K/UL (ref 135–420)
PLATELET # BLD AUTO: 197 K/UL (ref 135–420)
PLATELET # BLD AUTO: 199 K/UL (ref 135–420)
PLATELET # BLD AUTO: 209 K/UL (ref 135–420)
PLATELET # BLD AUTO: 237 K/UL (ref 135–420)
PLATELET COMMENTS,PCOM: ABNORMAL
PLATELET COMMENTS,PCOM: ADEQUATE
PMV BLD AUTO: 10.1 FL (ref 9.2–11.8)
PMV BLD AUTO: 9.1 FL (ref 9.2–11.8)
PMV BLD AUTO: 9.3 FL (ref 9.2–11.8)
PMV BLD AUTO: 9.4 FL (ref 9.2–11.8)
PMV BLD AUTO: 9.4 FL (ref 9.2–11.8)
PMV BLD AUTO: 9.6 FL (ref 9.2–11.8)
PMV BLD AUTO: 9.6 FL (ref 9.2–11.8)
PMV BLD AUTO: 9.7 FL (ref 9.2–11.8)
POTASSIUM SERPL-SCNC: 3.7 MMOL/L (ref 3.2–5.1)
POTASSIUM SERPL-SCNC: 3.8 MMOL/L (ref 3.2–5.1)
POTASSIUM SERPL-SCNC: 4 MMOL/L (ref 3.2–5.1)
POTASSIUM SERPL-SCNC: 4.2 MMOL/L (ref 3.2–5.1)
POTASSIUM SERPL-SCNC: 4.6 MMOL/L (ref 3.2–5.1)
POTASSIUM SERPL-SCNC: 5.3 MMOL/L (ref 3.5–5.2)
PROT SERPL-MCNC: 4.8 G/DL (ref 6.1–8.4)
PROT SERPL-MCNC: 6.5 G/DL (ref 6.1–8.4)
PROT UR STRIP-MCNC: 100 MG/DL
PROT UR STRIP-MCNC: 100 MG/DL
PROT UR STRIP-MCNC: ABNORMAL MG/DL
PROT UR STRIP-MCNC: ABNORMAL MG/DL
Q-T INTERVAL, ECG07: 466 MS
QRS DURATION, ECG06: 133 MS
QTC CALCULATION (BEZET), ECG08: 489 MS
RBC # BLD AUTO: 2.93 M/UL (ref 4.2–5.3)
RBC # BLD AUTO: 3 M/UL (ref 4.2–5.3)
RBC # BLD AUTO: 3.13 M/UL (ref 4.2–5.3)
RBC # BLD AUTO: 3.14 M/UL (ref 4.2–5.3)
RBC # BLD AUTO: 3.18 M/UL (ref 4.2–5.3)
RBC # BLD AUTO: 3.21 M/UL (ref 4.2–5.3)
RBC # BLD AUTO: 3.22 M/UL (ref 4.2–5.3)
RBC # BLD AUTO: 3.53 M/UL (ref 4.2–5.3)
RBC #/AREA URNS HPF: ABNORMAL /HPF (ref 0–2)
RBC MORPH BLD: ABNORMAL
SARS-COV-2, COV2: NORMAL
SARS-COV-2, NAA: NOT DETECTED
SODIUM SERPL-SCNC: 132 MMOL/L (ref 135–145)
SODIUM SERPL-SCNC: 133 MMOL/L (ref 135–145)
SODIUM SERPL-SCNC: 134 MMOL/L (ref 135–145)
SODIUM SERPL-SCNC: 134 MMOL/L (ref 135–145)
SODIUM SERPL-SCNC: 139 MMOL/L (ref 135–145)
SODIUM SERPL-SCNC: 141 MMOL/L (ref 135–145)
SODIUM SERPL-SCNC: 142 MMOL/L (ref 134–144)
SODIUM SERPL-SCNC: 142 MMOL/L (ref 135–145)
SP GR UR REFRACTOMETRY: 1.01 (ref 1–1.03)
SP GR UR REFRACTOMETRY: 1.01 (ref 1–1.03)
SP GR UR REFRACTOMETRY: 1.02 (ref 1–1.03)
SP GR UR REFRACTOMETRY: 1.02 (ref 1–1.03)
SPECIAL REQUESTS,SREQ: ABNORMAL
SPECIAL REQUESTS,SREQ: ABNORMAL
SPECIAL REQUESTS,SREQ: NORMAL
SPECIMEN SOURCE: NORMAL
T4 SERPL-MCNC: 9.1 UG/DL (ref 4.5–12)
TROPONIN I SERPL-MCNC: <0.02 NG/ML (ref 0.02–0.05)
TSH SERPL DL<=0.005 MIU/L-ACNC: 0.07 UIU/ML (ref 0.45–4.5)
UA: UC IF INDICATED,UAUC: ABNORMAL
UROBILINOGEN UR QL STRIP.AUTO: 0.2 EU/DL (ref 0.2–1)
UROBILINOGEN UR QL STRIP.AUTO: 0.2 EU/DL (ref 0.2–1)
UROBILINOGEN UR QL STRIP.AUTO: 1 EU/DL (ref 0.2–1)
UROBILINOGEN UR QL STRIP.AUTO: 1 EU/DL (ref 0.2–1)
VALPROATE SERPL-MCNC: 36 UG/ML (ref 50–100)
VENTRICULAR RATE, ECG03: 66 BPM
WBC # BLD AUTO: 4.9 K/UL (ref 4.6–13.2)
WBC # BLD AUTO: 5.3 K/UL (ref 4.6–13.2)
WBC # BLD AUTO: 5.5 K/UL (ref 4.6–13.2)
WBC # BLD AUTO: 6.2 K/UL (ref 4.6–13.2)
WBC # BLD AUTO: 7.6 K/UL (ref 4.6–13.2)
WBC # BLD AUTO: 7.7 K/UL (ref 4.6–13.2)
WBC # BLD AUTO: 8.7 K/UL (ref 4.6–13.2)
WBC # BLD AUTO: 9.3 K/UL (ref 4.6–13.2)
WBC URNS QL MICRO: >100 /HPF (ref 0–4)

## 2021-01-01 PROCEDURE — 1101F PT FALLS ASSESS-DOCD LE1/YR: CPT | Performed by: NURSE PRACTITIONER

## 2021-01-01 PROCEDURE — 74011250637 HC RX REV CODE- 250/637: Performed by: INTERNAL MEDICINE

## 2021-01-01 PROCEDURE — 36415 COLL VENOUS BLD VENIPUNCTURE: CPT

## 2021-01-01 PROCEDURE — 85025 COMPLETE CBC W/AUTO DIFF WBC: CPT

## 2021-01-01 PROCEDURE — 83735 ASSAY OF MAGNESIUM: CPT

## 2021-01-01 PROCEDURE — 99213 OFFICE O/P EST LOW 20 MIN: CPT | Performed by: INTERNAL MEDICINE

## 2021-01-01 PROCEDURE — G8400 PT W/DXA NO RESULTS DOC: HCPCS | Performed by: NURSE PRACTITIONER

## 2021-01-01 PROCEDURE — G8427 DOCREV CUR MEDS BY ELIG CLIN: HCPCS | Performed by: NURSE PRACTITIONER

## 2021-01-01 PROCEDURE — 99441 PR PHYS/QHP TELEPHONE EVALUATION 5-10 MIN: CPT | Performed by: NURSE PRACTITIONER

## 2021-01-01 PROCEDURE — 74011250637 HC RX REV CODE- 250/637: Performed by: NURSE PRACTITIONER

## 2021-01-01 PROCEDURE — 1090F PRES/ABSN URINE INCON ASSESS: CPT | Performed by: INTERNAL MEDICINE

## 2021-01-01 PROCEDURE — 99285 EMERGENCY DEPT VISIT HI MDM: CPT

## 2021-01-01 PROCEDURE — 97530 THERAPEUTIC ACTIVITIES: CPT

## 2021-01-01 PROCEDURE — 96374 THER/PROPH/DIAG INJ IV PUSH: CPT

## 2021-01-01 PROCEDURE — 87186 SC STD MICRODIL/AGAR DIL: CPT

## 2021-01-01 PROCEDURE — 74011250636 HC RX REV CODE- 250/636: Performed by: EMERGENCY MEDICINE

## 2021-01-01 PROCEDURE — 1101F PT FALLS ASSESS-DOCD LE1/YR: CPT | Performed by: INTERNAL MEDICINE

## 2021-01-01 PROCEDURE — G9717 DOC PT DX DEP/BP F/U NT REQ: HCPCS | Performed by: INTERNAL MEDICINE

## 2021-01-01 PROCEDURE — 1090F PRES/ABSN URINE INCON ASSESS: CPT | Performed by: NURSE PRACTITIONER

## 2021-01-01 PROCEDURE — G8417 CALC BMI ABV UP PARAM F/U: HCPCS | Performed by: INTERNAL MEDICINE

## 2021-01-01 PROCEDURE — 81001 URINALYSIS AUTO W/SCOPE: CPT

## 2021-01-01 PROCEDURE — 80048 BASIC METABOLIC PNL TOTAL CA: CPT

## 2021-01-01 PROCEDURE — 87635 SARS-COV-2 COVID-19 AMP PRB: CPT

## 2021-01-01 PROCEDURE — 87086 URINE CULTURE/COLONY COUNT: CPT

## 2021-01-01 PROCEDURE — 3017F COLORECTAL CA SCREEN DOC REV: CPT | Performed by: INTERNAL MEDICINE

## 2021-01-01 PROCEDURE — G8417 CALC BMI ABV UP PARAM F/U: HCPCS | Performed by: NURSE PRACTITIONER

## 2021-01-01 PROCEDURE — G9717 DOC PT DX DEP/BP F/U NT REQ: HCPCS | Performed by: NURSE PRACTITIONER

## 2021-01-01 PROCEDURE — 96375 TX/PRO/DX INJ NEW DRUG ADDON: CPT

## 2021-01-01 PROCEDURE — G8536 NO DOC ELDER MAL SCRN: HCPCS | Performed by: INTERNAL MEDICINE

## 2021-01-01 PROCEDURE — 84100 ASSAY OF PHOSPHORUS: CPT

## 2021-01-01 PROCEDURE — 3017F COLORECTAL CA SCREEN DOC REV: CPT | Performed by: NURSE PRACTITIONER

## 2021-01-01 PROCEDURE — G8400 PT W/DXA NO RESULTS DOC: HCPCS | Performed by: INTERNAL MEDICINE

## 2021-01-01 PROCEDURE — 1111F DSCHRG MED/CURRENT MED MERGE: CPT | Performed by: NURSE PRACTITIONER

## 2021-01-01 PROCEDURE — 74246 X-RAY XM UPR GI TRC 2CNTRST: CPT

## 2021-01-01 PROCEDURE — G8427 DOCREV CUR MEDS BY ELIG CLIN: HCPCS | Performed by: ORTHOPAEDIC SURGERY

## 2021-01-01 PROCEDURE — G8754 DIAS BP LESS 90: HCPCS | Performed by: INTERNAL MEDICINE

## 2021-01-01 PROCEDURE — 85027 COMPLETE CBC AUTOMATED: CPT

## 2021-01-01 PROCEDURE — G8536 NO DOC ELDER MAL SCRN: HCPCS | Performed by: ORTHOPAEDIC SURGERY

## 2021-01-01 PROCEDURE — 73030 X-RAY EXAM OF SHOULDER: CPT

## 2021-01-01 PROCEDURE — 74011000250 HC RX REV CODE- 250: Performed by: INTERNAL MEDICINE

## 2021-01-01 PROCEDURE — 99214 OFFICE O/P EST MOD 30 MIN: CPT | Performed by: NURSE PRACTITIONER

## 2021-01-01 PROCEDURE — G8536 NO DOC ELDER MAL SCRN: HCPCS | Performed by: NURSE PRACTITIONER

## 2021-01-01 PROCEDURE — 74011250636 HC RX REV CODE- 250/636: Performed by: FAMILY MEDICINE

## 2021-01-01 PROCEDURE — 74011000255 HC RX REV CODE- 255: Performed by: INTERNAL MEDICINE

## 2021-01-01 PROCEDURE — 72125 CT NECK SPINE W/O DYE: CPT

## 2021-01-01 PROCEDURE — U0003 INFECTIOUS AGENT DETECTION BY NUCLEIC ACID (DNA OR RNA); SEVERE ACUTE RESPIRATORY SYNDROME CORONAVIRUS 2 (SARS-COV-2) (CORONAVIRUS DISEASE [COVID-19]), AMPLIFIED PROBE TECHNIQUE, MAKING USE OF HIGH THROUGHPUT TECHNOLOGIES AS DESCRIBED BY CMS-2020-01-R: HCPCS

## 2021-01-01 PROCEDURE — 65270000029 HC RM PRIVATE

## 2021-01-01 PROCEDURE — G8752 SYS BP LESS 140: HCPCS | Performed by: INTERNAL MEDICINE

## 2021-01-01 PROCEDURE — 99218 HC RM OBSERVATION: CPT

## 2021-01-01 PROCEDURE — G8400 PT W/DXA NO RESULTS DOC: HCPCS | Performed by: ORTHOPAEDIC SURGERY

## 2021-01-01 PROCEDURE — 96376 TX/PRO/DX INJ SAME DRUG ADON: CPT

## 2021-01-01 PROCEDURE — 87077 CULTURE AEROBIC IDENTIFY: CPT

## 2021-01-01 PROCEDURE — 96365 THER/PROPH/DIAG IV INF INIT: CPT

## 2021-01-01 PROCEDURE — 74011250637 HC RX REV CODE- 250/637: Performed by: EMERGENCY MEDICINE

## 2021-01-01 PROCEDURE — 70450 CT HEAD/BRAIN W/O DYE: CPT

## 2021-01-01 PROCEDURE — G8756 NO BP MEASURE DOC: HCPCS | Performed by: NURSE PRACTITIONER

## 2021-01-01 PROCEDURE — 70486 CT MAXILLOFACIAL W/O DYE: CPT

## 2021-01-01 PROCEDURE — 99283 EMERGENCY DEPT VISIT LOW MDM: CPT

## 2021-01-01 PROCEDURE — 80053 COMPREHEN METABOLIC PANEL: CPT

## 2021-01-01 PROCEDURE — G8427 DOCREV CUR MEDS BY ELIG CLIN: HCPCS | Performed by: INTERNAL MEDICINE

## 2021-01-01 PROCEDURE — 82962 GLUCOSE BLOOD TEST: CPT

## 2021-01-01 PROCEDURE — 99001 SPECIMEN HANDLING PT-LAB: CPT

## 2021-01-01 PROCEDURE — 71045 X-RAY EXAM CHEST 1 VIEW: CPT

## 2021-01-01 PROCEDURE — 99212 OFFICE O/P EST SF 10 MIN: CPT | Performed by: NURSE PRACTITIONER

## 2021-01-01 PROCEDURE — 99024 POSTOP FOLLOW-UP VISIT: CPT | Performed by: ORTHOPAEDIC SURGERY

## 2021-01-01 PROCEDURE — 74011000258 HC RX REV CODE- 258: Performed by: FAMILY MEDICINE

## 2021-01-01 PROCEDURE — 97161 PT EVAL LOW COMPLEX 20 MIN: CPT

## 2021-01-01 PROCEDURE — 1101F PT FALLS ASSESS-DOCD LE1/YR: CPT | Performed by: ORTHOPAEDIC SURGERY

## 2021-01-01 PROCEDURE — 83690 ASSAY OF LIPASE: CPT

## 2021-01-01 PROCEDURE — G9717 DOC PT DX DEP/BP F/U NT REQ: HCPCS | Performed by: ORTHOPAEDIC SURGERY

## 2021-01-01 PROCEDURE — 99284 EMERGENCY DEPT VISIT MOD MDM: CPT

## 2021-01-01 PROCEDURE — 74011250636 HC RX REV CODE- 250/636: Performed by: NURSE PRACTITIONER

## 2021-01-01 PROCEDURE — 97110 THERAPEUTIC EXERCISES: CPT

## 2021-01-01 PROCEDURE — G8417 CALC BMI ABV UP PARAM F/U: HCPCS | Performed by: ORTHOPAEDIC SURGERY

## 2021-01-01 PROCEDURE — 84484 ASSAY OF TROPONIN QUANT: CPT

## 2021-01-01 PROCEDURE — 96360 HYDRATION IV INFUSION INIT: CPT

## 2021-01-01 PROCEDURE — 93005 ELECTROCARDIOGRAM TRACING: CPT

## 2021-01-01 PROCEDURE — 97166 OT EVAL MOD COMPLEX 45 MIN: CPT

## 2021-01-01 PROCEDURE — 3017F COLORECTAL CA SCREEN DOC REV: CPT | Performed by: ORTHOPAEDIC SURGERY

## 2021-01-01 PROCEDURE — G8756 NO BP MEASURE DOC: HCPCS | Performed by: ORTHOPAEDIC SURGERY

## 2021-01-01 PROCEDURE — 1111F DSCHRG MED/CURRENT MED MERGE: CPT | Performed by: ORTHOPAEDIC SURGERY

## 2021-01-01 PROCEDURE — 1090F PRES/ABSN URINE INCON ASSESS: CPT | Performed by: ORTHOPAEDIC SURGERY

## 2021-01-01 PROCEDURE — 74011000258 HC RX REV CODE- 258: Performed by: EMERGENCY MEDICINE

## 2021-01-01 RX ORDER — FOLIC ACID/VIT B COMPLEX AND C 0.8 MG
TABLET ORAL
Qty: 90 TAB | Refills: 3 | Status: SHIPPED | OUTPATIENT
Start: 2021-01-01 | End: 2021-01-01 | Stop reason: SDUPTHER

## 2021-01-01 RX ORDER — ONDANSETRON 4 MG/1
TABLET, FILM COATED ORAL
COMMUNITY
Start: 2021-01-01

## 2021-01-01 RX ORDER — FUROSEMIDE 40 MG/1
TABLET ORAL
Qty: 60 TAB | Refills: 1 | Status: SHIPPED | OUTPATIENT
Start: 2021-01-01 | End: 2021-01-01 | Stop reason: SDUPTHER

## 2021-01-01 RX ORDER — DIVALPROEX SODIUM 500 MG/1
500 TABLET, DELAYED RELEASE ORAL
Qty: 90 TABLET | Refills: 2 | Status: SHIPPED | OUTPATIENT
Start: 2021-01-01

## 2021-01-01 RX ORDER — DIVALPROEX SODIUM 500 MG/1
500 TABLET, DELAYED RELEASE ORAL DAILY
Status: DISCONTINUED | OUTPATIENT
Start: 2021-01-01 | End: 2021-01-01 | Stop reason: HOSPADM

## 2021-01-01 RX ORDER — CLOTRIMAZOLE AND BETAMETHASONE DIPROPIONATE 10; .64 MG/G; MG/G
CREAM TOPICAL
Qty: 15 G | Refills: 3 | Status: ON HOLD | OUTPATIENT
Start: 2021-01-01 | End: 2021-01-01

## 2021-01-01 RX ORDER — SULFAMETHOXAZOLE AND TRIMETHOPRIM 800; 160 MG/1; MG/1
1 TABLET ORAL 2 TIMES DAILY
Qty: 14 TAB | Refills: 0 | Status: SHIPPED | OUTPATIENT
Start: 2021-01-01 | End: 2021-01-01

## 2021-01-01 RX ORDER — GABAPENTIN 300 MG/1
300 CAPSULE ORAL 3 TIMES DAILY
Status: DISCONTINUED | OUTPATIENT
Start: 2021-01-01 | End: 2021-01-01 | Stop reason: HOSPADM

## 2021-01-01 RX ORDER — ONDANSETRON 4 MG/1
4 TABLET, ORALLY DISINTEGRATING ORAL
Status: DISCONTINUED | OUTPATIENT
Start: 2021-01-01 | End: 2021-01-01 | Stop reason: HOSPADM

## 2021-01-01 RX ORDER — BUSPIRONE HYDROCHLORIDE 15 MG/1
15 TABLET ORAL 3 TIMES DAILY
COMMUNITY
Start: 2021-01-01

## 2021-01-01 RX ORDER — ONDANSETRON 4 MG/1
4 TABLET, ORALLY DISINTEGRATING ORAL
Qty: 30 TABLET | Refills: 0 | Status: SHIPPED | OUTPATIENT
Start: 2021-01-01 | End: 2021-01-01

## 2021-01-01 RX ORDER — TRAZODONE HYDROCHLORIDE 50 MG/1
150 TABLET ORAL
Status: DISCONTINUED | OUTPATIENT
Start: 2021-01-01 | End: 2021-01-01 | Stop reason: HOSPADM

## 2021-01-01 RX ORDER — RISPERIDONE 0.5 MG/1
0.5 TABLET, FILM COATED ORAL
Qty: 90 TAB | Refills: 0 | Status: SHIPPED | OUTPATIENT
Start: 2021-01-01 | End: 2021-01-01

## 2021-01-01 RX ORDER — BUSPIRONE HYDROCHLORIDE 10 MG/1
10 TABLET ORAL 3 TIMES DAILY
Qty: 270 TAB | Refills: 0 | Status: SHIPPED | OUTPATIENT
Start: 2021-01-01 | End: 2021-01-01

## 2021-01-01 RX ORDER — CEPHALEXIN 500 MG/1
500 CAPSULE ORAL 3 TIMES DAILY
Qty: 30 CAP | Refills: 0 | Status: SHIPPED | OUTPATIENT
Start: 2021-01-01 | End: 2021-01-01

## 2021-01-01 RX ORDER — TRAZODONE HYDROCHLORIDE 50 MG/1
50 TABLET ORAL
Qty: 90 TAB | Refills: 0 | Status: SHIPPED | OUTPATIENT
Start: 2021-01-01 | End: 2021-01-01 | Stop reason: SDUPTHER

## 2021-01-01 RX ORDER — HYDROCODONE BITARTRATE AND ACETAMINOPHEN 5; 325 MG/1; MG/1
1 TABLET ORAL
Status: DISCONTINUED | OUTPATIENT
Start: 2021-01-01 | End: 2021-01-01 | Stop reason: HOSPADM

## 2021-01-01 RX ORDER — GABAPENTIN 300 MG/1
300 CAPSULE ORAL 3 TIMES DAILY
Qty: 90 CAPSULE | Refills: 3 | Status: SHIPPED | OUTPATIENT
Start: 2021-01-01

## 2021-01-01 RX ORDER — DIVALPROEX SODIUM 500 MG/1
500 TABLET, DELAYED RELEASE ORAL DAILY
Qty: 90 TAB | Refills: 0 | Status: SHIPPED | OUTPATIENT
Start: 2021-01-01 | End: 2021-01-01

## 2021-01-01 RX ORDER — NITROFURANTOIN 25; 75 MG/1; MG/1
100 CAPSULE ORAL 2 TIMES DAILY
Status: DISCONTINUED | OUTPATIENT
Start: 2021-01-01 | End: 2021-01-01 | Stop reason: HOSPADM

## 2021-01-01 RX ORDER — SULFAMETHOXAZOLE AND TRIMETHOPRIM 800; 160 MG/1; MG/1
1 TABLET ORAL 2 TIMES DAILY
Qty: 14 TABLET | Refills: 0 | Status: SHIPPED | OUTPATIENT
Start: 2021-01-01 | End: 2021-01-01 | Stop reason: SDUPTHER

## 2021-01-01 RX ORDER — SODIUM CHLORIDE 450 MG/100ML
50 INJECTION, SOLUTION INTRAVENOUS CONTINUOUS
Status: DISCONTINUED | OUTPATIENT
Start: 2021-01-01 | End: 2021-01-01

## 2021-01-01 RX ORDER — TRAZODONE HYDROCHLORIDE 100 MG/1
100 TABLET ORAL
Qty: 90 TAB | Refills: 0 | Status: SHIPPED | OUTPATIENT
Start: 2021-01-01 | End: 2021-01-01 | Stop reason: SDUPTHER

## 2021-01-01 RX ORDER — TRIHEXYPHENIDYL HYDROCHLORIDE 2 MG/1
2 TABLET ORAL DAILY
Qty: 30 TABLET | Refills: 0 | Status: SHIPPED | OUTPATIENT
Start: 2021-01-01 | End: 2021-01-01

## 2021-01-01 RX ORDER — AMIODARONE HYDROCHLORIDE 200 MG/1
200 TABLET ORAL DAILY
Status: DISCONTINUED | OUTPATIENT
Start: 2021-01-01 | End: 2021-01-01 | Stop reason: HOSPADM

## 2021-01-01 RX ORDER — ONDANSETRON 2 MG/ML
4 INJECTION INTRAMUSCULAR; INTRAVENOUS
Status: DISCONTINUED | OUTPATIENT
Start: 2021-01-01 | End: 2021-01-01 | Stop reason: HOSPADM

## 2021-01-01 RX ORDER — SODIUM CHLORIDE 0.9 % (FLUSH) 0.9 %
5-40 SYRINGE (ML) INJECTION EVERY 8 HOURS
Status: DISCONTINUED | OUTPATIENT
Start: 2021-01-01 | End: 2021-01-01

## 2021-01-01 RX ORDER — SODIUM CHLORIDE 0.9 % (FLUSH) 0.9 %
5-40 SYRINGE (ML) INJECTION AS NEEDED
Status: DISCONTINUED | OUTPATIENT
Start: 2021-01-01 | End: 2021-01-01 | Stop reason: HOSPADM

## 2021-01-01 RX ORDER — OMEGA-3 FATTY ACIDS 1000 MG
1 CAPSULE ORAL DAILY
Status: DISCONTINUED | OUTPATIENT
Start: 2021-01-01 | End: 2021-01-01 | Stop reason: HOSPADM

## 2021-01-01 RX ORDER — TRAZODONE HYDROCHLORIDE 150 MG/1
TABLET ORAL
COMMUNITY
Start: 2021-01-01

## 2021-01-01 RX ORDER — SULFAMETHOXAZOLE AND TRIMETHOPRIM 800; 160 MG/1; MG/1
1 TABLET ORAL 2 TIMES DAILY
Qty: 14 TABLET | Refills: 0 | Status: SHIPPED | OUTPATIENT
Start: 2021-01-01 | End: 2022-01-01

## 2021-01-01 RX ORDER — DIPHENHYDRAMINE HCL 25 MG
25 TABLET ORAL
Status: DISCONTINUED | OUTPATIENT
Start: 2021-01-01 | End: 2021-01-01 | Stop reason: HOSPADM

## 2021-01-01 RX ORDER — GABAPENTIN 300 MG/1
300 CAPSULE ORAL 3 TIMES DAILY
Qty: 90 CAP | Refills: 0 | Status: SHIPPED | OUTPATIENT
Start: 2021-01-01 | End: 2021-01-01

## 2021-01-01 RX ORDER — FENTANYL CITRATE 50 UG/ML
25 INJECTION, SOLUTION INTRAMUSCULAR; INTRAVENOUS
Status: DISCONTINUED | OUTPATIENT
Start: 2021-01-01 | End: 2021-01-01

## 2021-01-01 RX ORDER — AMOXICILLIN AND CLAVULANATE POTASSIUM 875; 125 MG/1; MG/1
TABLET, FILM COATED ORAL
COMMUNITY
Start: 2021-01-01

## 2021-01-01 RX ORDER — BUSPIRONE HYDROCHLORIDE 5 MG/1
15 TABLET ORAL 3 TIMES DAILY
Status: DISCONTINUED | OUTPATIENT
Start: 2021-01-01 | End: 2021-01-01 | Stop reason: HOSPADM

## 2021-01-01 RX ORDER — CIPROFLOXACIN 500 MG/1
TABLET ORAL
COMMUNITY
Start: 2021-01-01 | End: 2021-01-01

## 2021-01-01 RX ORDER — ACETAMINOPHEN 325 MG/1
650 TABLET ORAL
Status: DISCONTINUED | OUTPATIENT
Start: 2021-01-01 | End: 2021-01-01 | Stop reason: HOSPADM

## 2021-01-01 RX ORDER — SODIUM CHLORIDE 0.9 % (FLUSH) 0.9 %
5-40 SYRINGE (ML) INJECTION EVERY 8 HOURS
Status: DISCONTINUED | OUTPATIENT
Start: 2021-01-01 | End: 2021-01-01 | Stop reason: HOSPADM

## 2021-01-01 RX ORDER — ARIPIPRAZOLE 10 MG/1
TABLET ORAL
COMMUNITY
Start: 2021-01-01

## 2021-01-01 RX ORDER — AMIODARONE HYDROCHLORIDE 200 MG/1
TABLET ORAL
Qty: 90 TABLET | Refills: 3 | Status: SHIPPED | OUTPATIENT
Start: 2021-01-01

## 2021-01-01 RX ORDER — QUETIAPINE FUMARATE 100 MG/1
100 TABLET, FILM COATED ORAL
COMMUNITY
End: 2021-01-01

## 2021-01-01 RX ORDER — LEVOTHYROXINE SODIUM 200 UG/1
TABLET ORAL
Qty: 90 TABLET | Refills: 3 | Status: SHIPPED | OUTPATIENT
Start: 2021-01-01 | End: 2021-01-01 | Stop reason: SDUPTHER

## 2021-01-01 RX ORDER — FUROSEMIDE 40 MG/1
40 TABLET ORAL DAILY
Qty: 30 TABLET | Refills: 0 | Status: ON HOLD
Start: 2021-01-01 | End: 2021-01-01 | Stop reason: SDUPTHER

## 2021-01-01 RX ORDER — GABAPENTIN 300 MG/1
CAPSULE ORAL
Qty: 90 CAP | Refills: 0 | Status: SHIPPED | OUTPATIENT
Start: 2021-01-01 | End: 2021-01-01

## 2021-01-01 RX ORDER — THERA TABS 400 MCG
1 TAB ORAL DAILY
Status: DISCONTINUED | OUTPATIENT
Start: 2021-01-01 | End: 2021-01-01 | Stop reason: HOSPADM

## 2021-01-01 RX ORDER — HYDROCODONE BITARTRATE AND ACETAMINOPHEN 5; 325 MG/1; MG/1
2 TABLET ORAL
Status: DISCONTINUED | OUTPATIENT
Start: 2021-01-01 | End: 2021-01-01 | Stop reason: HOSPADM

## 2021-01-01 RX ORDER — FUROSEMIDE 40 MG/1
40 TABLET ORAL DAILY
Qty: 30 TABLET | Refills: 0 | Status: SHIPPED | OUTPATIENT
Start: 2021-01-01

## 2021-01-01 RX ORDER — GABAPENTIN 300 MG/1
300 CAPSULE ORAL 3 TIMES DAILY
Qty: 90 CAP | Refills: 3 | Status: SHIPPED | OUTPATIENT
Start: 2021-01-01 | End: 2021-01-01 | Stop reason: SDUPTHER

## 2021-01-01 RX ORDER — ACETAMINOPHEN 325 MG/1
975 TABLET ORAL
Status: COMPLETED | OUTPATIENT
Start: 2021-01-01 | End: 2021-01-01

## 2021-01-01 RX ORDER — CEPHALEXIN 500 MG/1
500 CAPSULE ORAL 4 TIMES DAILY
Qty: 16 CAPSULE | Refills: 0 | Status: SHIPPED | OUTPATIENT
Start: 2021-01-01 | End: 2021-01-01

## 2021-01-01 RX ORDER — FENTANYL CITRATE 50 UG/ML
100 INJECTION, SOLUTION INTRAMUSCULAR; INTRAVENOUS
Status: COMPLETED | OUTPATIENT
Start: 2021-01-01 | End: 2021-01-01

## 2021-01-01 RX ORDER — ACETAMINOPHEN 650 MG/1
650 SUPPOSITORY RECTAL
Status: DISCONTINUED | OUTPATIENT
Start: 2021-01-01 | End: 2021-01-01 | Stop reason: HOSPADM

## 2021-01-01 RX ORDER — ACETAMINOPHEN 325 MG/1
650 TABLET ORAL
Qty: 40 TABLET | Refills: 0 | Status: SHIPPED | OUTPATIENT
Start: 2021-01-01

## 2021-01-01 RX ORDER — BUSPIRONE HYDROCHLORIDE 15 MG/1
15 TABLET ORAL 3 TIMES DAILY
Qty: 270 TABLET | Refills: 0 | Status: SHIPPED | OUTPATIENT
Start: 2021-01-01 | End: 2021-01-01

## 2021-01-01 RX ORDER — POLYETHYLENE GLYCOL 3350 17 G/17G
17 POWDER, FOR SOLUTION ORAL DAILY PRN
Status: DISCONTINUED | OUTPATIENT
Start: 2021-01-01 | End: 2021-01-01 | Stop reason: HOSPADM

## 2021-01-01 RX ORDER — ONDANSETRON 2 MG/ML
4 INJECTION INTRAMUSCULAR; INTRAVENOUS
Status: COMPLETED | OUTPATIENT
Start: 2021-01-01 | End: 2021-01-01

## 2021-01-01 RX ORDER — MAG HYDROX/ALUMINUM HYD/SIMETH 200-200-20
30 SUSPENSION, ORAL (FINAL DOSE FORM) ORAL
Status: DISCONTINUED | OUTPATIENT
Start: 2021-01-01 | End: 2021-01-01 | Stop reason: HOSPADM

## 2021-01-01 RX ORDER — IBUPROFEN 600 MG/1
600 TABLET ORAL
Qty: 90 TAB | Refills: 3 | Status: SHIPPED | OUTPATIENT
Start: 2021-01-01

## 2021-01-01 RX ORDER — FERROUS SULFATE TAB 325 MG (65 MG ELEMENTAL FE) 325 (65 FE) MG
1 TAB ORAL DAILY
COMMUNITY
Start: 2021-01-01

## 2021-01-01 RX ORDER — FUROSEMIDE 40 MG/1
TABLET ORAL
Qty: 60 TAB | Refills: 1 | Status: SHIPPED | OUTPATIENT
Start: 2021-01-01 | End: 2021-01-01

## 2021-01-01 RX ORDER — CETIRIZINE HCL 10 MG
10 TABLET ORAL DAILY
Status: DISCONTINUED | OUTPATIENT
Start: 2021-01-01 | End: 2021-01-01 | Stop reason: HOSPADM

## 2021-01-01 RX ORDER — NITROFURANTOIN 25; 75 MG/1; MG/1
100 CAPSULE ORAL 2 TIMES DAILY
Qty: 14 CAPSULE | Refills: 0 | Status: SHIPPED | OUTPATIENT
Start: 2021-01-01 | End: 2021-01-01

## 2021-01-01 RX ORDER — FUROSEMIDE 40 MG/1
40 TABLET ORAL DAILY
Status: DISCONTINUED | OUTPATIENT
Start: 2021-01-01 | End: 2021-01-01 | Stop reason: HOSPADM

## 2021-01-01 RX ORDER — LEVOTHYROXINE SODIUM 200 UG/1
TABLET ORAL
Qty: 90 TABLET | Refills: 3 | Status: SHIPPED | OUTPATIENT
Start: 2021-01-01

## 2021-01-01 RX ORDER — BUSPIRONE HYDROCHLORIDE 15 MG/1
15 TABLET ORAL 3 TIMES DAILY
Qty: 270 TAB | Refills: 0 | Status: SHIPPED | OUTPATIENT
Start: 2021-01-01 | End: 2021-01-01 | Stop reason: SDUPTHER

## 2021-01-01 RX ORDER — HYDROCODONE BITARTRATE AND ACETAMINOPHEN 7.5; 325 MG/1; MG/1
TABLET ORAL
COMMUNITY
Start: 2021-01-01

## 2021-01-01 RX ORDER — LEVOTHYROXINE SODIUM 100 UG/1
200 TABLET ORAL
Status: DISCONTINUED | OUTPATIENT
Start: 2021-01-01 | End: 2021-01-01 | Stop reason: HOSPADM

## 2021-01-01 RX ORDER — GABAPENTIN 400 MG/1
CAPSULE ORAL
COMMUNITY
Start: 2021-01-01

## 2021-01-01 RX ORDER — TRAZODONE HYDROCHLORIDE 150 MG/1
150 TABLET ORAL
Qty: 90 TABLET | Refills: 0 | Status: SHIPPED | OUTPATIENT
Start: 2021-01-01 | End: 2021-01-01

## 2021-01-01 RX ORDER — POLYETHYLENE GLYCOL 3350 17 G/17G
17 POWDER, FOR SOLUTION ORAL DAILY
Qty: 10 PACKET | Refills: 0 | Status: SHIPPED | OUTPATIENT
Start: 2021-01-01

## 2021-01-01 RX ORDER — HYDROCODONE BITARTRATE AND ACETAMINOPHEN 10; 325 MG/1; MG/1
TABLET ORAL
COMMUNITY
Start: 2021-01-01

## 2021-01-01 RX ORDER — PANTOPRAZOLE SODIUM 40 MG/1
TABLET, DELAYED RELEASE ORAL
COMMUNITY
Start: 2021-01-01

## 2021-01-01 RX ORDER — FUROSEMIDE 40 MG/1
TABLET ORAL
Qty: 60 TAB | Refills: 5 | Status: ON HOLD | OUTPATIENT
Start: 2021-01-01 | End: 2021-01-01 | Stop reason: SDUPTHER

## 2021-01-01 RX ORDER — TRAZODONE HYDROCHLORIDE 50 MG/1
100 TABLET ORAL
Status: DISCONTINUED | OUTPATIENT
Start: 2021-01-01 | End: 2021-01-01 | Stop reason: HOSPADM

## 2021-01-01 RX ORDER — BUSPIRONE HYDROCHLORIDE 10 MG/1
TABLET ORAL
COMMUNITY
Start: 2020-09-02 | End: 2021-01-01 | Stop reason: SDUPTHER

## 2021-01-01 RX ORDER — TRIHEXYPHENIDYL HYDROCHLORIDE 2 MG/1
TABLET ORAL
Qty: 30 TABLET | Refills: 0 | Status: SHIPPED | OUTPATIENT
Start: 2021-01-01

## 2021-01-01 RX ADMIN — DIVALPROEX SODIUM 500 MG: 500 TABLET, DELAYED RELEASE ORAL at 08:36

## 2021-01-01 RX ADMIN — BUSPIRONE HYDROCHLORIDE 15 MG: 5 TABLET ORAL at 21:56

## 2021-01-01 RX ADMIN — HYDROCODONE BITARTRATE AND ACETAMINOPHEN 2 TABLET: 5; 325 TABLET ORAL at 06:12

## 2021-01-01 RX ADMIN — THERA TABS 1 TABLET: TAB at 08:15

## 2021-01-01 RX ADMIN — GABAPENTIN 300 MG: 300 CAPSULE ORAL at 08:45

## 2021-01-01 RX ADMIN — APIXABAN 2.5 MG: 2.5 TABLET, FILM COATED ORAL at 08:38

## 2021-01-01 RX ADMIN — BUSPIRONE HYDROCHLORIDE 15 MG: 5 TABLET ORAL at 22:20

## 2021-01-01 RX ADMIN — APIXABAN 2.5 MG: 2.5 TABLET, FILM COATED ORAL at 08:17

## 2021-01-01 RX ADMIN — GABAPENTIN 300 MG: 300 CAPSULE ORAL at 10:02

## 2021-01-01 RX ADMIN — APIXABAN 2.5 MG: 2.5 TABLET, FILM COATED ORAL at 17:08

## 2021-01-01 RX ADMIN — SODIUM CHLORIDE 1000 ML: 9 INJECTION, SOLUTION INTRAVENOUS at 13:28

## 2021-01-01 RX ADMIN — APIXABAN 2.5 MG: 2.5 TABLET, FILM COATED ORAL at 17:17

## 2021-01-01 RX ADMIN — BUSPIRONE HYDROCHLORIDE 15 MG: 5 TABLET ORAL at 08:20

## 2021-01-01 RX ADMIN — AMIODARONE HYDROCHLORIDE 200 MG: 200 TABLET ORAL at 10:02

## 2021-01-01 RX ADMIN — THERA TABS 1 TABLET: TAB at 08:45

## 2021-01-01 RX ADMIN — Medication 10 ML: at 05:34

## 2021-01-01 RX ADMIN — GABAPENTIN 300 MG: 300 CAPSULE ORAL at 09:05

## 2021-01-01 RX ADMIN — APIXABAN 2.5 MG: 2.5 TABLET, FILM COATED ORAL at 17:49

## 2021-01-01 RX ADMIN — Medication 10 ML: at 16:28

## 2021-01-01 RX ADMIN — THERA TABS 1 TABLET: TAB at 09:05

## 2021-01-01 RX ADMIN — SODIUM CHLORIDE 1000 ML: 9 INJECTION, SOLUTION INTRAVENOUS at 14:57

## 2021-01-01 RX ADMIN — GABAPENTIN 300 MG: 300 CAPSULE ORAL at 17:31

## 2021-01-01 RX ADMIN — CETIRIZINE HYDROCHLORIDE 10 MG: 10 TABLET, FILM COATED ORAL at 08:45

## 2021-01-01 RX ADMIN — GABAPENTIN 300 MG: 300 CAPSULE ORAL at 08:38

## 2021-01-01 RX ADMIN — DIPHENHYDRAMINE HYDROCHLORIDE 25 MG: 25 TABLET ORAL at 15:00

## 2021-01-01 RX ADMIN — GABAPENTIN 300 MG: 300 CAPSULE ORAL at 21:56

## 2021-01-01 RX ADMIN — DIVALPROEX SODIUM 500 MG: 500 TABLET, DELAYED RELEASE ORAL at 09:05

## 2021-01-01 RX ADMIN — DIVALPROEX SODIUM 500 MG: 500 TABLET, DELAYED RELEASE ORAL at 08:38

## 2021-01-01 RX ADMIN — FENTANYL CITRATE 100 MCG: 50 INJECTION, SOLUTION INTRAMUSCULAR; INTRAVENOUS at 15:25

## 2021-01-01 RX ADMIN — BUSPIRONE HYDROCHLORIDE 15 MG: 5 TABLET ORAL at 21:14

## 2021-01-01 RX ADMIN — TRAZODONE HYDROCHLORIDE 150 MG: 50 TABLET ORAL at 21:18

## 2021-01-01 RX ADMIN — Medication 10 ML: at 05:31

## 2021-01-01 RX ADMIN — TRAZODONE HYDROCHLORIDE 150 MG: 50 TABLET ORAL at 22:55

## 2021-01-01 RX ADMIN — LEVOTHYROXINE SODIUM 200 MCG: 0.1 TABLET ORAL at 05:50

## 2021-01-01 RX ADMIN — HYDROCODONE BITARTRATE AND ACETAMINOPHEN 2 TABLET: 5; 325 TABLET ORAL at 10:18

## 2021-01-01 RX ADMIN — GABAPENTIN 300 MG: 300 CAPSULE ORAL at 21:15

## 2021-01-01 RX ADMIN — ALUMINUM HYDROXIDE, MAGNESIUM HYDROXIDE, AND SIMETHICONE 30 ML: 200; 200; 20 SUSPENSION ORAL at 17:31

## 2021-01-01 RX ADMIN — THERA TABS 1 TABLET: TAB at 08:18

## 2021-01-01 RX ADMIN — ACETAMINOPHEN 975 MG: 325 TABLET ORAL at 13:48

## 2021-01-01 RX ADMIN — HYDROCODONE BITARTRATE AND ACETAMINOPHEN 2 TABLET: 5; 325 TABLET ORAL at 20:13

## 2021-01-01 RX ADMIN — AMIODARONE HYDROCHLORIDE 200 MG: 200 TABLET ORAL at 08:17

## 2021-01-01 RX ADMIN — DIPHENHYDRAMINE HYDROCHLORIDE 25 MG: 25 TABLET ORAL at 19:30

## 2021-01-01 RX ADMIN — DIVALPROEX SODIUM 500 MG: 500 TABLET, DELAYED RELEASE ORAL at 08:17

## 2021-01-01 RX ADMIN — DIPHENHYDRAMINE HYDROCHLORIDE 25 MG: 25 TABLET ORAL at 08:21

## 2021-01-01 RX ADMIN — BUSPIRONE HYDROCHLORIDE 15 MG: 5 TABLET ORAL at 16:07

## 2021-01-01 RX ADMIN — GABAPENTIN 300 MG: 300 CAPSULE ORAL at 22:20

## 2021-01-01 RX ADMIN — HYDROCODONE BITARTRATE AND ACETAMINOPHEN 2 TABLET: 5; 325 TABLET ORAL at 05:14

## 2021-01-01 RX ADMIN — DIPHENHYDRAMINE HYDROCHLORIDE 25 MG: 25 TABLET ORAL at 02:23

## 2021-01-01 RX ADMIN — HYDROCODONE BITARTRATE AND ACETAMINOPHEN 2 TABLET: 5; 325 TABLET ORAL at 16:27

## 2021-01-01 RX ADMIN — APIXABAN 2.5 MG: 2.5 TABLET, FILM COATED ORAL at 08:21

## 2021-01-01 RX ADMIN — TRAZODONE HYDROCHLORIDE 100 MG: 50 TABLET ORAL at 23:51

## 2021-01-01 RX ADMIN — AMIODARONE HYDROCHLORIDE 200 MG: 200 TABLET ORAL at 08:21

## 2021-01-01 RX ADMIN — BUSPIRONE HYDROCHLORIDE 15 MG: 5 TABLET ORAL at 17:06

## 2021-01-01 RX ADMIN — DIPHENHYDRAMINE HYDROCHLORIDE 25 MG: 25 TABLET ORAL at 08:15

## 2021-01-01 RX ADMIN — BUSPIRONE HYDROCHLORIDE 15 MG: 5 TABLET ORAL at 21:44

## 2021-01-01 RX ADMIN — LEVOTHYROXINE SODIUM 200 MCG: 0.1 TABLET ORAL at 05:31

## 2021-01-01 RX ADMIN — FENTANYL CITRATE 25 MCG: 50 INJECTION, SOLUTION INTRAMUSCULAR; INTRAVENOUS at 00:03

## 2021-01-01 RX ADMIN — DIPHENHYDRAMINE HYDROCHLORIDE 25 MG: 25 TABLET ORAL at 04:04

## 2021-01-01 RX ADMIN — ACETAMINOPHEN 650 MG: 325 TABLET ORAL at 23:52

## 2021-01-01 RX ADMIN — Medication 10 ML: at 21:18

## 2021-01-01 RX ADMIN — TRAZODONE HYDROCHLORIDE 150 MG: 50 TABLET ORAL at 00:26

## 2021-01-01 RX ADMIN — BUSPIRONE HYDROCHLORIDE 15 MG: 5 TABLET ORAL at 21:50

## 2021-01-01 RX ADMIN — HYDROCODONE BITARTRATE AND ACETAMINOPHEN 1 TABLET: 5; 325 TABLET ORAL at 04:04

## 2021-01-01 RX ADMIN — BUSPIRONE HYDROCHLORIDE 15 MG: 5 TABLET ORAL at 15:30

## 2021-01-01 RX ADMIN — LEVOTHYROXINE SODIUM 200 MCG: 0.1 TABLET ORAL at 05:52

## 2021-01-01 RX ADMIN — LEVOTHYROXINE SODIUM 200 MCG: 0.1 TABLET ORAL at 06:13

## 2021-01-01 RX ADMIN — DIVALPROEX SODIUM 500 MG: 500 TABLET, DELAYED RELEASE ORAL at 08:45

## 2021-01-01 RX ADMIN — HYDROCODONE BITARTRATE AND ACETAMINOPHEN 2 TABLET: 5; 325 TABLET ORAL at 12:34

## 2021-01-01 RX ADMIN — Medication 10 ML: at 05:14

## 2021-01-01 RX ADMIN — BUSPIRONE HYDROCHLORIDE 15 MG: 5 TABLET ORAL at 10:02

## 2021-01-01 RX ADMIN — GABAPENTIN 300 MG: 300 CAPSULE ORAL at 15:30

## 2021-01-01 RX ADMIN — HYDROCODONE BITARTRATE AND ACETAMINOPHEN 2 TABLET: 5; 325 TABLET ORAL at 14:40

## 2021-01-01 RX ADMIN — LEVOTHYROXINE SODIUM 200 MCG: 0.1 TABLET ORAL at 06:12

## 2021-01-01 RX ADMIN — FUROSEMIDE 40 MG: 40 TABLET ORAL at 08:21

## 2021-01-01 RX ADMIN — LEVOTHYROXINE SODIUM 200 MCG: 0.1 TABLET ORAL at 06:31

## 2021-01-01 RX ADMIN — HYDROCODONE BITARTRATE AND ACETAMINOPHEN 2 TABLET: 5; 325 TABLET ORAL at 13:41

## 2021-01-01 RX ADMIN — BUSPIRONE HYDROCHLORIDE 15 MG: 5 TABLET ORAL at 09:14

## 2021-01-01 RX ADMIN — DIPHENHYDRAMINE HYDROCHLORIDE 25 MG: 25 TABLET ORAL at 06:14

## 2021-01-01 RX ADMIN — FUROSEMIDE 40 MG: 40 TABLET ORAL at 08:15

## 2021-01-01 RX ADMIN — CETIRIZINE HYDROCHLORIDE 10 MG: 10 TABLET, FILM COATED ORAL at 09:05

## 2021-01-01 RX ADMIN — HYDROCODONE BITARTRATE AND ACETAMINOPHEN 2 TABLET: 5; 325 TABLET ORAL at 08:20

## 2021-01-01 RX ADMIN — LEVOTHYROXINE SODIUM 200 MCG: 0.1 TABLET ORAL at 05:33

## 2021-01-01 RX ADMIN — Medication 10 ML: at 06:23

## 2021-01-01 RX ADMIN — BARIUM SULFATE 528 G: 960 POWDER, FOR SUSPENSION ORAL at 08:20

## 2021-01-01 RX ADMIN — ONDANSETRON 4 MG: 2 INJECTION INTRAMUSCULAR; INTRAVENOUS at 15:25

## 2021-01-01 RX ADMIN — GABAPENTIN 300 MG: 300 CAPSULE ORAL at 21:50

## 2021-01-01 RX ADMIN — THERA TABS 1 TABLET: TAB at 10:02

## 2021-01-01 RX ADMIN — HYDROCODONE BITARTRATE AND ACETAMINOPHEN 2 TABLET: 5; 325 TABLET ORAL at 08:37

## 2021-01-01 RX ADMIN — AMIODARONE HYDROCHLORIDE 200 MG: 200 TABLET ORAL at 08:37

## 2021-01-01 RX ADMIN — DIPHENHYDRAMINE HYDROCHLORIDE 25 MG: 25 TABLET ORAL at 13:06

## 2021-01-01 RX ADMIN — TRAZODONE HYDROCHLORIDE 150 MG: 50 TABLET ORAL at 22:02

## 2021-01-01 RX ADMIN — HYDROCODONE BITARTRATE AND ACETAMINOPHEN 2 TABLET: 5; 325 TABLET ORAL at 16:06

## 2021-01-01 RX ADMIN — BUSPIRONE HYDROCHLORIDE 15 MG: 5 TABLET ORAL at 08:15

## 2021-01-01 RX ADMIN — BUSPIRONE HYDROCHLORIDE 15 MG: 5 TABLET ORAL at 15:00

## 2021-01-01 RX ADMIN — AMIODARONE HYDROCHLORIDE 200 MG: 200 TABLET ORAL at 08:15

## 2021-01-01 RX ADMIN — APIXABAN 2.5 MG: 2.5 TABLET, FILM COATED ORAL at 08:37

## 2021-01-01 RX ADMIN — HYDROCODONE BITARTRATE AND ACETAMINOPHEN 2 TABLET: 5; 325 TABLET ORAL at 20:53

## 2021-01-01 RX ADMIN — Medication 10 ML: at 22:58

## 2021-01-01 RX ADMIN — BUSPIRONE HYDROCHLORIDE 15 MG: 5 TABLET ORAL at 22:56

## 2021-01-01 RX ADMIN — GABAPENTIN 300 MG: 300 CAPSULE ORAL at 22:56

## 2021-01-01 RX ADMIN — GABAPENTIN 300 MG: 300 CAPSULE ORAL at 15:00

## 2021-01-01 RX ADMIN — CETIRIZINE HYDROCHLORIDE 10 MG: 10 TABLET, FILM COATED ORAL at 10:02

## 2021-01-01 RX ADMIN — BUSPIRONE HYDROCHLORIDE 15 MG: 5 TABLET ORAL at 08:44

## 2021-01-01 RX ADMIN — CETIRIZINE HYDROCHLORIDE 10 MG: 10 TABLET, FILM COATED ORAL at 08:21

## 2021-01-01 RX ADMIN — BUSPIRONE HYDROCHLORIDE 15 MG: 5 TABLET ORAL at 17:31

## 2021-01-01 RX ADMIN — HYDROCODONE BITARTRATE AND ACETAMINOPHEN 2 TABLET: 5; 325 TABLET ORAL at 01:49

## 2021-01-01 RX ADMIN — CETIRIZINE HYDROCHLORIDE 10 MG: 10 TABLET, FILM COATED ORAL at 08:17

## 2021-01-01 RX ADMIN — DIVALPROEX SODIUM 500 MG: 500 TABLET, DELAYED RELEASE ORAL at 08:21

## 2021-01-01 RX ADMIN — NITROFURANTOIN MONOHYDRATE/MACROCRYSTALLINE 100 MG: 25; 75 CAPSULE ORAL at 08:14

## 2021-01-01 RX ADMIN — BUSPIRONE HYDROCHLORIDE 15 MG: 5 TABLET ORAL at 21:45

## 2021-01-01 RX ADMIN — FUROSEMIDE 40 MG: 40 TABLET ORAL at 10:02

## 2021-01-01 RX ADMIN — NITROFURANTOIN MONOHYDRATE/MACROCRYSTALLINE 100 MG: 25; 75 CAPSULE ORAL at 17:08

## 2021-01-01 RX ADMIN — BUSPIRONE HYDROCHLORIDE 15 MG: 5 TABLET ORAL at 09:05

## 2021-01-01 RX ADMIN — HYDROCODONE BITARTRATE AND ACETAMINOPHEN 2 TABLET: 5; 325 TABLET ORAL at 21:14

## 2021-01-01 RX ADMIN — APIXABAN 2.5 MG: 2.5 TABLET, FILM COATED ORAL at 17:31

## 2021-01-01 RX ADMIN — APIXABAN 2.5 MG: 2.5 TABLET, FILM COATED ORAL at 08:15

## 2021-01-01 RX ADMIN — TRAZODONE HYDROCHLORIDE 150 MG: 50 TABLET ORAL at 21:50

## 2021-01-01 RX ADMIN — GABAPENTIN 300 MG: 300 CAPSULE ORAL at 08:14

## 2021-01-01 RX ADMIN — HYDROCODONE BITARTRATE AND ACETAMINOPHEN 2 TABLET: 5; 325 TABLET ORAL at 12:39

## 2021-01-01 RX ADMIN — GABAPENTIN 300 MG: 300 CAPSULE ORAL at 16:27

## 2021-01-01 RX ADMIN — DIPHENHYDRAMINE HYDROCHLORIDE 25 MG: 25 TABLET ORAL at 21:15

## 2021-01-01 RX ADMIN — Medication 10 ML: at 17:31

## 2021-01-01 RX ADMIN — HYDROCODONE BITARTRATE AND ACETAMINOPHEN 2 TABLET: 5; 325 TABLET ORAL at 17:51

## 2021-01-01 RX ADMIN — DIPHENHYDRAMINE HYDROCHLORIDE 25 MG: 25 TABLET ORAL at 20:13

## 2021-01-01 RX ADMIN — CEFTRIAXONE 1 G: 1 INJECTION, POWDER, FOR SOLUTION INTRAMUSCULAR; INTRAVENOUS at 14:56

## 2021-01-01 RX ADMIN — APIXABAN 2.5 MG: 2.5 TABLET, FILM COATED ORAL at 09:05

## 2021-01-01 RX ADMIN — CEFTRIAXONE 1 G: 1 INJECTION, POWDER, FOR SOLUTION INTRAMUSCULAR; INTRAVENOUS at 17:04

## 2021-01-01 RX ADMIN — GABAPENTIN 300 MG: 300 CAPSULE ORAL at 15:18

## 2021-01-01 RX ADMIN — DIVALPROEX SODIUM 500 MG: 500 TABLET, DELAYED RELEASE ORAL at 10:02

## 2021-01-01 RX ADMIN — Medication 10 ML: at 22:20

## 2021-01-01 RX ADMIN — GABAPENTIN 300 MG: 300 CAPSULE ORAL at 17:06

## 2021-01-01 RX ADMIN — AMIODARONE HYDROCHLORIDE 200 MG: 200 TABLET ORAL at 08:45

## 2021-01-01 RX ADMIN — DIPHENHYDRAMINE HYDROCHLORIDE 25 MG: 25 TABLET ORAL at 12:35

## 2021-01-01 RX ADMIN — GABAPENTIN 300 MG: 300 CAPSULE ORAL at 08:21

## 2021-01-01 RX ADMIN — HYDROCODONE BITARTRATE AND ACETAMINOPHEN 2 TABLET: 5; 325 TABLET ORAL at 20:16

## 2021-01-01 RX ADMIN — Medication 10 ML: at 14:42

## 2021-01-01 RX ADMIN — BUSPIRONE HYDROCHLORIDE 15 MG: 5 TABLET ORAL at 08:38

## 2021-01-01 RX ADMIN — FUROSEMIDE 40 MG: 40 TABLET ORAL at 08:45

## 2021-01-01 RX ADMIN — CETIRIZINE HYDROCHLORIDE 10 MG: 10 TABLET, FILM COATED ORAL at 08:38

## 2021-01-01 RX ADMIN — Medication 10 ML: at 14:00

## 2021-01-01 RX ADMIN — HYDROCODONE BITARTRATE AND ACETAMINOPHEN 2 TABLET: 5; 325 TABLET ORAL at 21:44

## 2021-01-01 RX ADMIN — HYDROCODONE BITARTRATE AND ACETAMINOPHEN 2 TABLET: 5; 325 TABLET ORAL at 05:33

## 2021-01-01 RX ADMIN — APIXABAN 2.5 MG: 2.5 TABLET, FILM COATED ORAL at 17:06

## 2021-01-01 RX ADMIN — ACETAMINOPHEN 650 MG: 325 TABLET ORAL at 21:45

## 2021-01-01 RX ADMIN — DIVALPROEX SODIUM 500 MG: 500 TABLET, DELAYED RELEASE ORAL at 08:15

## 2021-01-01 RX ADMIN — CETIRIZINE HYDROCHLORIDE 10 MG: 10 TABLET, FILM COATED ORAL at 08:15

## 2021-01-01 RX ADMIN — BUSPIRONE HYDROCHLORIDE 15 MG: 5 TABLET ORAL at 16:27

## 2021-01-01 RX ADMIN — LEVOTHYROXINE SODIUM 200 MCG: 0.1 TABLET ORAL at 05:14

## 2021-01-01 RX ADMIN — DIPHENHYDRAMINE HYDROCHLORIDE 25 MG: 25 TABLET ORAL at 20:16

## 2021-01-01 RX ADMIN — SODIUM CHLORIDE 100 ML/HR: 4.5 INJECTION, SOLUTION INTRAVENOUS at 17:17

## 2021-01-01 RX ADMIN — FUROSEMIDE 40 MG: 40 TABLET ORAL at 08:17

## 2021-01-01 RX ADMIN — THERA TABS 1 TABLET: TAB at 08:37

## 2021-01-01 RX ADMIN — GABAPENTIN 300 MG: 300 CAPSULE ORAL at 21:45

## 2021-01-01 RX ADMIN — HYDROCODONE BITARTRATE AND ACETAMINOPHEN 2 TABLET: 5; 325 TABLET ORAL at 21:50

## 2021-01-01 RX ADMIN — BUSPIRONE HYDROCHLORIDE 15 MG: 5 TABLET ORAL at 15:17

## 2021-01-01 RX ADMIN — GABAPENTIN 300 MG: 300 CAPSULE ORAL at 16:06

## 2021-01-01 RX ADMIN — Medication 10 ML: at 17:19

## 2021-01-01 RX ADMIN — Medication 10 ML: at 22:02

## 2021-01-01 RX ADMIN — AMIODARONE HYDROCHLORIDE 200 MG: 200 TABLET ORAL at 09:05

## 2021-01-01 RX ADMIN — Medication 10 ML: at 06:26

## 2021-01-01 RX ADMIN — APIXABAN 2.5 MG: 2.5 TABLET, FILM COATED ORAL at 21:46

## 2021-01-01 RX ADMIN — Medication 10 ML: at 22:19

## 2021-01-01 RX ADMIN — HYDROCODONE BITARTRATE AND ACETAMINOPHEN 2 TABLET: 5; 325 TABLET ORAL at 08:14

## 2021-01-01 RX ADMIN — GABAPENTIN 300 MG: 300 CAPSULE ORAL at 21:44

## 2021-01-01 RX ADMIN — FUROSEMIDE 40 MG: 40 TABLET ORAL at 08:38

## 2021-01-01 RX ADMIN — HYDROCODONE BITARTRATE AND ACETAMINOPHEN 2 TABLET: 5; 325 TABLET ORAL at 15:17

## 2021-01-01 RX ADMIN — APIXABAN 2.5 MG: 2.5 TABLET, FILM COATED ORAL at 17:19

## 2021-01-01 RX ADMIN — GABAPENTIN 300 MG: 300 CAPSULE ORAL at 08:17

## 2021-01-01 RX ADMIN — THERA TABS 1 TABLET: TAB at 08:21

## 2021-01-01 RX ADMIN — HYDROCODONE BITARTRATE AND ACETAMINOPHEN 2 TABLET: 5; 325 TABLET ORAL at 07:18

## 2021-01-01 RX ADMIN — APIXABAN 2.5 MG: 2.5 TABLET, FILM COATED ORAL at 08:45

## 2021-01-20 NOTE — TELEPHONE ENCOUNTER
Patient calls to hear about her lab results. She can be reached at [phone]    Paper copy of labs with patient phone number placed on provider's desk for him to review and call.

## 2021-01-27 NOTE — PROGRESS NOTES
Mikey Elias is a 77 y.o. female who presents today for the following: Chief Complaint Patient presents with  Follow-up \"I'm having bouts of anxiety but I'm alright. \"  
 Anxiety Mikey Elias, who was evaluated through a synchronous (real-time) audio telephone encounter, and/or her healthcare decision maker, is aware that it is a billable service, with coverage as determined by her insurance carrier. She provided verbal consent to proceed: YES Consent obtained within past 12 months:Yes, and patient identification was verified. It was conducted pursuant to the emergency declaration under the [address] [place] [address], [address] [place] [address] authority and the [facility] and [employer] Act. A caregiver was present when appropriate. Ability to conduct physical exam was limited. I was home. The patient was home. Length of telephone visit 9 minutes and 16 seconds. Allergies Allergen Reactions  Codeine Unknown (comments)  Hydromorphone Unknown (comments) Patient states it makes her not stop talking, jittery  Hydromorphone (Bulk) Other (comments) Makes crazy  Prednisone Unknown (comments)  Succinylcholine Chloride Unknown (comments)  Topiramate Other (comments) Current Outpatient Medications Medication Sig  Isabel-Seth 0.8 mg tab tablet TAKE 1 TABLET EVERY DAY  gabapentin (NEURONTIN) 300 mg capsule TAKE 1 CAP BY MOUTH THREE (3) TIMES DAILY. MAX DAILY AMOUNT: 900 MG. INDICATIONS: NEUROPATHIC PAIN  
 busPIRone (BUSPAR) 10 mg tablet TAKE 1 TAB BY MOUTH THREE (3) TIMES DAILY FOR 90 DAYS.  Eliquis 2.5 mg tablet TAKE 1 TABLET TWICE DAILY  QUEtiapine (SEROquel) 50 mg tablet TAKE 1 TABLET BY MOUTH EVERYDAY AT BEDTIME  acetaminophen (TYLENOL) 500 mg tablet Take 1 Tab by mouth every six (6) hours as needed for Pain.  fexofenadine (Allegra Allergy) 180 mg tablet Take 1 Tab by mouth daily.  omega-3 fatty acids (Fish Oil Concentrate) 1,000 mg cap Take 1 Tab by mouth daily.  magnesium oxide (MAG-OX) 400 mg tablet Take 1 Tab by mouth daily.  b complex-vitamin c-folic acid 0.8 mg (Isabel-Seth) 0.8 mg tab tablet Take 1 Tab by mouth daily.  diclofenac (Voltaren) 1 % gel Apply 1 Applicator to affected area as needed.  amiodarone (CORDARONE) 200 mg tablet Take 1 Tab by mouth daily.  cranberry extract 425 mg cap Take 425 mg by mouth two (2) times a day.  divalproex ER (DEPAKOTE ER) 500 mg ER tablet Take 1,000 mg by mouth every twelve (12) hours.  fluticasone propionate (FLONASE) 50 mcg/actuation nasal spray 2 Sprays by Both Nostrils route daily.  Isabel-Seth 0.8 mg tab tablet Take 1 Tab by mouth daily.  ibuprofen (MOTRIN) 200 mg tablet Take 400 mg by mouth daily as needed.  levothyroxine (SYNTHROID) 200 mcg tablet Take 1 Tab by mouth daily.  lisinopriL (PRINIVIL, ZESTRIL) 10 mg tablet Take 1 Tab by mouth daily.  ferrous sulfate 325 mg (65 mg iron) tablet TAKE 1 TABLET EVERY DAY  furosemide (LASIX) 20 mg tablet Take 2 Tabs by mouth daily.  baclofen (LIORESAL) 10 mg tablet TAKE 1 TABLET THREE TIMES DAILY No current facility-administered medications for this visit. Past Medical History:  
Diagnosis Date  CHF (congestive heart failure) ([facility] [address]  Chronic neck and back pain  Frequent UTI  Hypercholesteremia  Hypertension  Obese Past Surgical History:  
Procedure Laterality Date  HX ABOVE KNEE AMPUTATION    
 right  HX CERVICAL FUSION    
 HX CHOLECYSTECTOMY  HX GASTRIC BYPASS  HX HYSTERECTOMY  HX ORTHOPAEDIC    
 neck surgery  HX PARTIAL THYROIDECTOMY History reviewed. No pertinent family history. Social History Socioeconomic History  Marital status:  Spouse name: Not on file  Number of children: 4  Years of education: Not on file  Highest education level: Some college, no degree Occupational History  Not on file Social Needs  Financial resource strain: Not hard at all  Food insecurity Worry: Never true Inability: Never true  Transportation needs Medical: No  
  Non-medical: No  
Tobacco Use  Smoking status: Never Smoker  Smokeless tobacco: Never Used Substance and Sexual Activity  Alcohol use: Not Currently Comment: rare  Drug use: Never  Sexual activity: Not Currently Lifestyle  Physical activity Days per week: Not on file Minutes per session: Not on file  Stress: Not on file Relationships  Social connections Talks on phone: Not on file Gets together: Not on file Attends Restorationism service: Not on file Active member of club or organization: Not on file Attends meetings of clubs or organizations: Not on file Relationship status: Not on file  Intimate partner violence Fear of current or ex partner: Not on file Emotionally abused: Not on file Physically abused: Not on file Forced sexual activity: Not on file Other Topics Concern  Not on file Social History Narrative  Not on file Ms. Jeanna Parsons is unable to do virtual visit, telephone visit required. Patient follows up in the clinic for major depression and anxiety disorder. Patient last visited the clinic June 8, 2020. Psychotropic medications-Depakote 500 mg tab 1 tab twice daily, buspar 10 mg tab 1 tab three times daily and seroquel 50 mg tab 1 tab at bedtime. On today's visit, patient reports having depression and bouts of anxiety for the past couple of months. She reports increased anxiety in the afternoon and evening hours. Patient shares that most of her depression and anxiety is related to being physically inactive. Patient reports that she is unable to ambulate and does not feel useful. No suicidal/homicidal thinking, risk for suicide is low, protective factor-family. Patient respectfully declines counseling at this time and feels that she is doing well with following up here at this clinic for medication management. She openly admits jokingly that she does not get along well others. No psychotic symptoms noted. She reports stable sleep and appetite. No smoking, alcohol or drug use noted. Patient has good family and social support. Review of Systems Musculoskeletal:  
     Patient reports she is unable to ambulate. Psychiatric/Behavioral: Positive for depression. The patient is nervous/anxious. All other systems reviewed and are negative. There were no vitals taken for this visit. Physical Exam 
Psychiatric:     
   Attention and Perception: Attention and perception normal.     
   Mood and Affect: Mood is anxious and depressed. Speech: Speech normal.     
   Behavior: Behavior normal. Behavior is cooperative. Thought Content: Thought content normal.     
   Cognition and Memory: Cognition and memory normal.     
   Judgment: Judgment normal.  
 
  
Plan: For anxiety and depressive symptoms-change buspirone 10 mg tablet to 1 tablet in the morning, 1-1/2 tablets in the afternoon and 1-1/2 tablets in the evening. Patient reports that she does not need any refills at this time. She may continue Depakote and Seroquel as prescribed. Patient reports that she had recent labs drawn at Dr. Ganesh Granados office which she plans to follow-up on her Depakote level. Patient will contact the clinic as appropriate. Follow-up with medical provider as appropriate. Advised patient to avoid alcohol and drug use. Advised patient to call 911 or go to the emergency department for suicidal/homicidal thinking. Patient may call the office for any issues.

## 2021-02-04 NOTE — TELEPHONE ENCOUNTER
Patient calls to hear from you regarding her labs and her barium swallow. She can be reached at [phone].     I   Lab results are from 12/08/2020 and Barium swallow results are from 01/15/2021

## 2021-02-23 NOTE — PROGRESS NOTES
Brien Sandoval is a 77 y.o. female who presents today for the following: Chief Complaint Patient presents with  Follow-up \"I had to go back up on the Seroquel to 100 mg.\"  
 Medication Management  Depression Brien Sandoval, who was evaluated through a synchronous (real-time) audio telephone encounter, and/or her healthcare decision maker, is aware that it is a billable service, with coverage as determined by her insurance carrier. She provided verbal consent to proceed: YES Consent obtained within past 12 months: Yes, and patient identification was verified. It was conducted pursuant to the emergency declaration under the [address] [place] [address], [address] [place] [address] authority and the [employer] and [employer] Act. A caregiver was present when appropriate. Ability to conduct physical exam was limited. I was home . The patient was home. Length of telephone 8 minutes and 32 seconds. Allergies Allergen Reactions  Codeine Unknown (comments)  Hydromorphone Unknown (comments) Patient states it makes her not stop talking, jittery  Hydromorphone (Bulk) Other (comments) Makes crazy  Prednisone Unknown (comments)  Succinylcholine Chloride Unknown (comments)  Topiramate Other (comments) Current Outpatient Medications Medication Sig  
 gabapentin (NEURONTIN) 300 mg capsule TAKE 1 CAP BY MOUTH THREE (3) TIMES DAILY. MAX DAILY AMOUNT 3 CAPSULES  
 busPIRone (BUSPAR) 10 mg tablet Take 1 tablet in the morning half tablet in the afternoon and half tablet in the evening.  Isabel-Seth 0.8 mg tab tablet TAKE 1 TABLET EVERY DAY  Eliquis 2.5 mg tablet TAKE 1 TABLET TWICE DAILY  QUEtiapine (SEROquel) 50 mg tablet TAKE 1 TABLET BY MOUTH EVERYDAY AT BEDTIME  acetaminophen (TYLENOL) 500 mg tablet Take 1 Tab by mouth every six (6) hours as needed for Pain.  fexofenadine (Allegra Allergy) 180 mg tablet Take 1 Tab by mouth daily.  omega-3 fatty acids (Fish Oil Concentrate) 1,000 mg cap Take 1 Tab by mouth daily.  magnesium oxide (MAG-OX) 400 mg tablet Take 1 Tab by mouth daily.  b complex-vitamin c-folic acid 0.8 mg (Isabel-Seth) 0.8 mg tab tablet Take 1 Tab by mouth daily.  diclofenac (Voltaren) 1 % gel Apply 1 Applicator to affected area as needed.  amiodarone (CORDARONE) 200 mg tablet Take 1 Tab by mouth daily.  cranberry extract 425 mg cap Take 425 mg by mouth two (2) times a day.  divalproex ER (DEPAKOTE ER) 500 mg ER tablet Take 1,000 mg by mouth every twelve (12) hours.  fluticasone propionate (FLONASE) 50 mcg/actuation nasal spray 2 Sprays by Both Nostrils route daily.  Isabel-Seth 0.8 mg tab tablet Take 1 Tab by mouth daily.  ibuprofen (MOTRIN) 200 mg tablet Take 400 mg by mouth daily as needed.  levothyroxine (SYNTHROID) 200 mcg tablet Take 1 Tab by mouth daily.  lisinopriL (PRINIVIL, ZESTRIL) 10 mg tablet Take 1 Tab by mouth daily.  ferrous sulfate 325 mg (65 mg iron) tablet TAKE 1 TABLET EVERY DAY  furosemide (LASIX) 20 mg tablet Take 2 Tabs by mouth daily.  baclofen (LIORESAL) 10 mg tablet TAKE 1 TABLET THREE TIMES DAILY No current facility-administered medications for this visit. Past Medical History:  
Diagnosis Date  CHF (congestive heart failure) ([facility] [address]  Chronic neck and back pain  Frequent UTI  Hypercholesteremia  Hypertension  Obese Past Surgical History:  
Procedure Laterality Date  HX ABOVE KNEE AMPUTATION    
 right  HX CERVICAL FUSION    
 HX CHOLECYSTECTOMY  HX GASTRIC BYPASS  HX HYSTERECTOMY  HX ORTHOPAEDIC    
 neck surgery  HX PARTIAL THYROIDECTOMY History reviewed. No pertinent family history. Social History Socioeconomic History  Marital status:  Spouse name: Not on file  Number of children: 4  Years of education: Not on file  Highest education level: Some college, no degree Occupational History  Not on file Social Needs  Financial resource strain: Not hard at all  Food insecurity Worry: Never true Inability: Never true  Transportation needs Medical: No  
  Non-medical: No  
Tobacco Use  Smoking status: Never Smoker  Smokeless tobacco: Never Used Substance and Sexual Activity  Alcohol use: Not Currently Comment: rare  Drug use: Never  Sexual activity: Not Currently Lifestyle  Physical activity Days per week: Not on file Minutes per session: Not on file  Stress: Not on file Relationships  Social connections Talks on phone: Not on file Gets together: Not on file Attends Anabaptism service: Not on file Active member of club or organization: Not on file Attends meetings of clubs or organizations: Not on file Relationship status: Not on file  Intimate partner violence Fear of current or ex partner: Not on file Emotionally abused: Not on file Physically abused: Not on file Forced sexual activity: Not on file Other Topics Concern  Not on file Social History Narrative  Not on file Mrs. Sirena Ash is unable to do virtual visit, telephone visit required. Patient last visited the clinic January 27, 2021, BuSpar was changed to 10 mg tablet 1 tablet in the morning, 1-1/2 tablets in the afternoon and 1-1/2 tablets in the evening. She continues Depakote 500 mg tab 1 tab twice daily and seroquel 100 mg tab 1 tab at bedtime. On today's visit, patient reports the last week of January and February have been very difficult due to nightmares and visual hallucinations. She reports she started back on Seroquel 100 mg tablet take 1 tablet at bedtime and since she has been taking current dose nightmares and hallucinations have stopped. However, she reports feeling paranoid and depressed on most days for the past couple of weeks. Appetite is stable. Sleep is fluctuating. She denies psychotic symptoms on direct questioning. No suicidal/homicidal thinking noted, risk for suicide is low, protective factor  and family. No alcohol or drug use reported. Patient lives in the home with her  in a stable and supportive home environment. Patient reports that her  is very supportive. On today's visit, she is requesting medication adjustment. 
  
 
 
 
Review of Systems Musculoskeletal: Positive for joint pain. Psychiatric/Behavioral: Positive for depression. The patient is nervous/anxious. All other systems reviewed and are negative. There were no vitals taken for this visit. Physical Exam 
Psychiatric:     
   Attention and Perception: Attention and perception normal.     
   Mood and Affect: Mood is anxious and depressed. Speech: Speech normal.     
   Behavior: Behavior is withdrawn. Thought Content: Thought content is paranoid. Cognition and Memory: Cognition and memory normal.     
   Judgment: Judgment normal.  
 
  
Plan: 
 
Change BuSpar to 15 mg tablet take 1 tablet 3 times daily for depression and anxiety. Change Seroquel 100 mg tablet to 1-1/2 tablets at bedtime for paranoia. Patient requested to hold off on ordering more medications at this time. She will call the clinic when is time for refill. She may continue Depakote. It is noted patient is unable to tolerate higher doses of Depakote due to hand tremors. Obtain Depakote level. Advised patient to avoid alcohol and drug use. Advised patient to call 911 or go to the emergency department for suicidal/homicidal thinking. Patient to call the clinic for any issues. Follow-up with medical provider as appropriate.

## 2021-03-01 PROBLEM — Z89.611 STATUS POST ABOVE-KNEE AMPUTATION OF RIGHT LOWER EXTREMITY (HCC): Status: ACTIVE | Noted: 2017-05-24

## 2021-03-01 PROBLEM — R29.3 POSTURAL INSTABILITY: Status: ACTIVE | Noted: 2017-12-15

## 2021-03-01 PROBLEM — Z99.3 WHEELCHAIR BOUND: Status: ACTIVE | Noted: 2017-05-24

## 2021-03-01 PROBLEM — G89.29 CHRONIC RIGHT SHOULDER PAIN: Status: ACTIVE | Noted: 2018-06-21

## 2021-03-01 PROBLEM — T87.9 AKA STUMP COMPLICATION (HCC): Status: ACTIVE | Noted: 2017-11-07

## 2021-03-01 PROBLEM — E44.0 PROTEIN-CALORIE MALNUTRITION, MODERATE (HCC): Status: ACTIVE | Noted: 2017-10-02

## 2021-03-01 PROBLEM — Z86.010 HISTORY OF ADENOMATOUS POLYP OF COLON: Status: ACTIVE | Noted: 2018-06-25

## 2021-03-01 PROBLEM — M00.9 SEPTIC ARTHRITIS (HCC): Status: ACTIVE | Noted: 2017-02-07

## 2021-03-01 PROBLEM — M79.602 LEFT ARM PAIN: Status: ACTIVE | Noted: 2018-06-21

## 2021-03-01 PROBLEM — Z87.39 HISTORY OF OSTEOMYELITIS: Status: ACTIVE | Noted: 2017-05-24

## 2021-03-01 PROBLEM — N30.91 HEMORRHAGIC CYSTITIS: Status: ACTIVE | Noted: 2018-02-13

## 2021-03-01 PROBLEM — K21.9 GASTROESOPHAGEAL REFLUX DISEASE WITHOUT ESOPHAGITIS: Status: ACTIVE | Noted: 2017-02-23

## 2021-03-01 PROBLEM — M25.511 CHRONIC RIGHT SHOULDER PAIN: Status: ACTIVE | Noted: 2018-06-21

## 2021-03-01 PROBLEM — E87.1 HYPONATREMIA: Status: ACTIVE | Noted: 2017-02-23

## 2021-03-01 PROBLEM — Z44.9: Status: ACTIVE | Noted: 2017-10-02

## 2021-03-02 NOTE — PROGRESS NOTES
Kamran Palacios presents today for Chief Complaint Patient presents with  Hypertension Is someone accompanying this pt?  Is the patient using any DME equipment during OV? wheelchair Depression Screening: 
3 most recent PHQ Screens 3/2/2021 PHQ Not Done - Little interest or pleasure in doing things Not at all Feeling down, depressed, irritable, or hopeless Not at all Total Score PHQ 2 0 Learning Assessment: 
Learning Assessment 9/17/2020 PRIMARY LEARNER Patient HIGHEST LEVEL OF EDUCATION - PRIMARY LEARNER  GRADUATED HIGH SCHOOL OR GED  
BARRIERS PRIMARY LEARNER NONE  
CO-LEARNER CAREGIVER No  
PRIMARY LANGUAGE ENGLISH  
LEARNER PREFERENCE PRIMARY DEMONSTRATION  
ANSWERED BY patient RELATIONSHIP SELF Abuse Screening: 
Abuse Screening Questionnaire 3/2/2021 Do you ever feel afraid of your partner? Truett Maryan Are you in a relationship with someone who physically or mentally threatens you? Truett Maryan Is it safe for you to go home? Rodrigo Johnson Fall Risk Fall Risk Assessment, last 12 mths 3/2/2021 Able to walk? No  
 
 
ADL ADL Assessment 3/2/2021 Feeding yourself No Help Needed Getting from bed to chair Help Needed Getting dressed Help Needed Bathing or showering Help Needed Walk across the room (includes cane/walker) Help Needed Using the telphone Help Needed Taking your medications Help Needed Preparing meals Help Needed Moderately strenuous housework (laundry) Help Needed Shopping for personal items (toiletries/medicines) Help Needed Shopping for groceries Help Needed Driving Help Needed Climbing a flight of stairs Help Needed Getting to places beyond walking distances Help Needed Health Maintenance reviewed and discussed and ordered per Provider. Health Maintenance Due Topic Date Due  
 Hepatitis C Screening  Never done  A1C test (Diabetic or Prediabetic)  Never done  COVID-19 Vaccine (1 of 2) Never done  DTaP/Tdap/Td series (1 - Tdap) Never done  Lipid Screen  Never done  Shingrix Vaccine Age 50> (1 of 2) Never done  Colorectal Cancer Screening Combo  Never done  Breast Cancer Screen Mammogram  Never done  GLAUCOMA SCREENING Q2Y  Never done  Bone Densitometry (Dexa) Screening  Never done  Pneumococcal 65+ years (1 of 1 - PPSV23) Never done  Medicare Yearly Exam  Never done Kip Lewis Coordination of Care: 1. Have you been to the ER, urgent care clinic since your last visit? Hospitalized since your last visit? no 
 
2. Have you seen or consulted any other health care providers outside of the [address] [place] [address] since your last visit? Include any pap smears or colon screening.  no

## 2021-03-02 NOTE — PROGRESS NOTES
Belen Norton is a 77 y.o. female presenting for tension and edema Chief Complaint Patient presents with  Hypertension HPI Julián Lizarraga comes in for routine follow-up and she is complaining of her left foot swelling. It does not even go down when she elevates it and she wonders if she can increase her Lasix. I reviewed her chart and inquired about the stress test that she had. 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 She does not want that to happen to her. I told her we would try to respect her wishes. Past Medical History:  
Diagnosis Date  CHF (congestive heart failure) ([facility] [address]  Chronic neck and back pain  Frequent UTI  Hypercholesteremia  Hypertension  Obese Current Outpatient Medications on File Prior to Visit Medication Sig Dispense Refill  busPIRone (BUSPAR) 10 mg tablet 1.5 in the morning and 1.5 at 2 pm and 1.5 in the evening  QUEtiapine (SEROquel) 100 mg tablet Take 100 mg by mouth nightly.  divalproex DR (DEPAKOTE) 500 mg tablet Take 1 Tab by mouth two (2) times a day for 90 days. 180 Tab 0  
 gabapentin (NEURONTIN) 300 mg capsule TAKE 1 CAP BY MOUTH THREE (3) TIMES DAILY. MAX DAILY AMOUNT 3 CAPSULES 90 Cap 0  
 Eliquis 2.5 mg tablet TAKE 1 TABLET TWICE DAILY 60 Tab 5  QUEtiapine (SEROquel) 50 mg tablet TAKE 1 TABLET BY MOUTH EVERYDAY AT BEDTIME 90 Tab 1  
 acetaminophen (TYLENOL) 500 mg tablet Take 1 Tab by mouth every six (6) hours as needed for Pain. 60 Tab 1  
 fexofenadine (Allegra Allergy) 180 mg tablet Take 1 Tab by mouth daily.  omega-3 fatty acids (Fish Oil Concentrate) 1,000 mg cap Take 1 Tab by mouth daily.  magnesium oxide (MAG-OX) 400 mg tablet Take 1 Tab by mouth daily.  b complex-vitamin c-folic acid 0.8 mg (Isabel-Seth) 0.8 mg tab tablet Take 1 Tab by mouth daily.  amiodarone (CORDARONE) 200 mg tablet Take 1 Tab by mouth daily.  cranberry extract 425 mg cap Take 425 mg by mouth two (2) times a day.  ibuprofen (MOTRIN) 200 mg tablet Take 400 mg by mouth daily as needed.  levothyroxine (SYNTHROID) 200 mcg tablet Take 1 Tab by mouth daily.  ferrous sulfate 325 mg (65 mg iron) tablet TAKE 1 TABLET EVERY DAY 90 Tab 2  
 furosemide (LASIX) 20 mg tablet Take 2 Tabs by mouth daily. 180 Tab 3  
 baclofen (LIORESAL) 10 mg tablet TAKE 1 TABLET THREE TIMES DAILY 270 Tab 2  
 multivitamin (MULTIPLE VITAMIN PO) Take 1 Tab by mouth daily.  [DISCONTINUED] busPIRone (BUSPAR) 10 mg tablet Take 1 tablet in the morning half tablet in the afternoon and half tablet in the evening. 180 Tab 0  [DISCONTINUED] Isabel-Seth 0.8 mg tab tablet TAKE 1 TABLET EVERY DAY 90 Tab 3  [DISCONTINUED] diclofenac (Voltaren) 1 % gel Apply 1 Applicator to affected area as needed.  [DISCONTINUED] divalproex ER (DEPAKOTE ER) 500 mg ER tablet Take 1,000 mg by mouth every twelve (12) hours.  [DISCONTINUED] fluticasone propionate (FLONASE) 50 mcg/actuation nasal spray 2 Sprays by Both Nostrils route daily.  [DISCONTINUED] Isabel-Seth 0.8 mg tab tablet Take 1 Tab by mouth daily.  [DISCONTINUED] lisinopriL (PRINIVIL, ZESTRIL) 10 mg tablet Take 1 Tab by mouth daily. No current facility-administered medications on file prior to visit. ROS no chest pain PND or orthopnea. She talked with her counselor who increased her sleep medicine and her anxiety medicine and things have settled down she feels. Visit Vitals /76 (BP 1 Location: Left arm, BP Patient Position: Sitting, BP Cuff Size: Large adult long) Temp 97.4 °F (36.3 °C) Resp 20 Physical Exam morbidly obese  woman seated in a wheelchair no acute distress oriented ENT unremarkable lungs bilaterally clear to auscultation heart rhythm irregularly irregular there is 2+ edema of her left lower extremity to the knee Assessment & Plan to increase her Lasix for 3 weeks to 80 mg a day and see her back and check blood work and then hopefully we can go back to 40

## 2021-03-23 NOTE — PROGRESS NOTES
Frantz Mace is a 77 y.o. female presenting for follow-up of edema now on 80 mg of Lasix Chief Complaint Patient presents with  Elevated Blood Pressure HPI currently feels much better on the increased dose of Lasix. She is feeling better her edema is less she actually fits into her wheelchair better. She is lying comfortably she is in occasionally having some cramps but if she drinks a little orange juice that takes care of it. I told her we would check blood work on her today and she wants to stay on the 80 mg of Lasix so we will check a BMP and be sure that her electrolytes look okay. Past Medical History:  
Diagnosis Date  CHF (congestive heart failure) ([facility] [address]  Chronic neck and back pain  Frequent UTI  Hypercholesteremia  Hypertension  Obese Current Outpatient Medications on File Prior to Visit Medication Sig Dispense Refill  busPIRone (BUSPAR) 10 mg tablet 1.5 in the morning and 1.5 at 2 pm and 1.5 in the evening  QUEtiapine (SEROquel) 100 mg tablet Take 100 mg by mouth nightly.  multivitamin (MULTIPLE VITAMIN PO) Take 1 Tab by mouth daily.  furosemide (LASIX) 40 mg tablet Take 2 tablets daily 60 Tab 1  
 divalproex DR (DEPAKOTE) 500 mg tablet Take 1 Tab by mouth two (2) times a day for 90 days. 180 Tab 0  
 gabapentin (NEURONTIN) 300 mg capsule TAKE 1 CAP BY MOUTH THREE (3) TIMES DAILY. MAX DAILY AMOUNT 3 CAPSULES 90 Cap 0  
 Eliquis 2.5 mg tablet TAKE 1 TABLET TWICE DAILY 60 Tab 5  QUEtiapine (SEROquel) 50 mg tablet TAKE 1 TABLET BY MOUTH EVERYDAY AT BEDTIME 90 Tab 1  
 acetaminophen (TYLENOL) 500 mg tablet Take 1 Tab by mouth every six (6) hours as needed for Pain. 60 Tab 1  
 fexofenadine (Allegra Allergy) 180 mg tablet Take 1 Tab by mouth daily.  omega-3 fatty acids (Fish Oil Concentrate) 1,000 mg cap Take 1 Tab by mouth daily.  magnesium oxide (MAG-OX) 400 mg tablet Take 1 Tab by mouth daily.     
 b complex-vitamin c-folic acid 0.8 mg (Isabel-Seth) 0.8 mg tab tablet Take 1 Tab by mouth daily.  amiodarone (CORDARONE) 200 mg tablet Take 1 Tab by mouth daily.  cranberry extract 425 mg cap Take 425 mg by mouth two (2) times a day.  ibuprofen (MOTRIN) 200 mg tablet Take 400 mg by mouth daily as needed.  levothyroxine (SYNTHROID) 200 mcg tablet Take 1 Tab by mouth daily.  ferrous sulfate 325 mg (65 mg iron) tablet TAKE 1 TABLET EVERY DAY 90 Tab 2  
 furosemide (LASIX) 20 mg tablet Take 2 Tabs by mouth daily. 180 Tab 3  
 baclofen (LIORESAL) 10 mg tablet TAKE 1 TABLET THREE TIMES DAILY 270 Tab 2 No current facility-administered medications on file prior to visit. ROS systems are reviewed and negative except as noted in the history of present illness Visit Vitals BP (!) 140/82 (BP 1 Location: Right arm, BP Patient Position: Sitting, BP Cuff Size: Adult) Temp 98.4 °F (36.9 °C) Wt 194 lb 12.8 oz (88.4 kg) BMI 29.62 kg/m² Physical Exam obese  woman seated in wheelchair alert oriented cooperative no acute distress normocephalic conjunctiva pink neck no lymphadenopathy lungs clear cardiac rhythm irregularly irregular she does have chronic atrial fib extremities do still reveal some edema but is definitely improved the redness is improved only a trace of pitting now. Assessment & Plan let her stay on the 80 of Lasix as long as her electrolytes look okay.

## 2021-03-25 NOTE — TELEPHONE ENCOUNTER
Reviewed labs with patient. She reports Dr. Trey Goss has been monitoring her labs closely and plans to contact her soon regarding most recent lab work.

## 2021-04-06 NOTE — TELEPHONE ENCOUNTER
Patient calls to hear from you regarding her labs. Looks like Jeff Browning also ordered labs and they have her listed as the ordering provider on all labs. You ordered a BMP.   Patient can be reached at [phone]    This was addressed on 04/08/2021 when the patient called about another issue

## 2021-04-29 NOTE — PROGRESS NOTES
Rosa Rosas is a 77 y.o. female who presents today for the following:  Chief Complaint   Patient presents with    Medication Management     \"Anything you can give me besides Seroquel and Depakote. \"    Follow-up       Allergies   Allergen Reactions    Succinylcholine Chloride Unknown (comments)     Other reaction(s): Other (See Comments)  Chest pain radiating into neck    Codeine Unknown (comments)     Other reaction(s): Other (See Comments)  Abdominal pain unless \"synthetic Codeine\"    Hydromorphone Unknown (comments)     Patient states it makes her not stop talking, jittery      Hydromorphone (Bulk) Other (comments)     Makes crazy      Prednisone Unknown (comments)     Other reaction(s): Other (See Comments)    \"It turns me into a witch,makes me mean\"    Topiramate Other (comments)       Current Outpatient Medications   Medication Sig    risperiDONE (RisperDAL) 0.5 mg tablet Take 1 Tab by mouth nightly for 90 days.  busPIRone (BUSPAR) 15 mg tablet Take 1 Tab by mouth three (3) times daily for 90 days.  furosemide (LASIX) 40 mg tablet TAKE 2 TABLETS BY MOUTH EVERY DAY    gabapentin (NEURONTIN) 300 mg capsule Take 1 Cap by mouth three (3) times daily. Max Daily Amount: 900 mg.    multivitamin (MULTIPLE VITAMIN PO) Take 1 Tab by mouth daily.  divalproex DR (DEPAKOTE) 500 mg tablet Take 1 Tab by mouth two (2) times a day for 90 days.  gabapentin (NEURONTIN) 300 mg capsule TAKE 1 CAP BY MOUTH THREE (3) TIMES DAILY. MAX DAILY AMOUNT 3 CAPSULES    Eliquis 2.5 mg tablet TAKE 1 TABLET TWICE DAILY    acetaminophen (TYLENOL) 500 mg tablet Take 1 Tab by mouth every six (6) hours as needed for Pain.  fexofenadine (Allegra Allergy) 180 mg tablet Take 1 Tab by mouth daily.  omega-3 fatty acids (Fish Oil Concentrate) 1,000 mg cap Take 1 Tab by mouth daily.  magnesium oxide (MAG-OX) 400 mg tablet Take 1 Tab by mouth daily.     b complex-vitamin c-folic acid 0.8 mg (Isabel-Seth) 0.8 mg tab tablet Take 1 Tab by mouth daily.  amiodarone (CORDARONE) 200 mg tablet Take 1 Tab by mouth daily.  cranberry extract 425 mg cap Take 425 mg by mouth two (2) times a day.  ibuprofen (MOTRIN) 200 mg tablet Take 400 mg by mouth daily as needed.  levothyroxine (SYNTHROID) 200 mcg tablet Take 1 Tab by mouth daily.  ferrous sulfate 325 mg (65 mg iron) tablet TAKE 1 TABLET EVERY DAY    furosemide (LASIX) 20 mg tablet Take 2 Tabs by mouth daily.  baclofen (LIORESAL) 10 mg tablet TAKE 1 TABLET THREE TIMES DAILY     No current facility-administered medications for this visit. Past Medical History:   Diagnosis Date    CHF (congestive heart failure) (HCC)     Chronic neck and back pain     Frequent UTI     Hypercholesteremia     Hypertension     Obese        Past Surgical History:   Procedure Laterality Date    HX ABOVE KNEE AMPUTATION      right    HX CERVICAL FUSION      HX CHOLECYSTECTOMY      HX GASTRIC BYPASS      HX HYSTERECTOMY      HX ORTHOPAEDIC      neck surgery    HX PARTIAL THYROIDECTOMY         History reviewed. No pertinent family history.     Social History     Socioeconomic History    Marital status:      Spouse name: Not on file    Number of children: 4    Years of education: Not on file    Highest education level: Some college, no degree   Occupational History    Not on file   Social Needs    Financial resource strain: Not hard at all   atCollab insecurity     Worry: Never true     Inability: Never true   [employer] needs     Medical: No     Non-medical: No   Tobacco Use    Smoking status: Never Smoker    Smokeless tobacco: Never Used   Substance and Sexual Activity    Alcohol use: Not Currently     Comment: rare    Drug use: Never    Sexual activity: Not Currently   Lifestyle    Physical activity     Days per week: Not on file     Minutes per session: Not on file    Stress: Not on file   Relationships    Social connections     Talks on phone: Not on file     Gets together: Not on file     Attends Advent service: Not on file     Active member of club or organization: Not on file     Attends meetings of clubs or organizations: Not on file     Relationship status: Not on file    Intimate partner violence     Fear of current or ex partner: Not on file     Emotionally abused: Not on file     Physically abused: Not on file     Forced sexual activity: Not on file   Other Topics Concern    Not on file   Social History Narrative    Not on file         Mrs. Mary Dugan follows up in clinic for major depression and anxiety disorder. She has history of bipolar depression and psychotic symptoms. Patient last visited the clinic virtually February 23, 2020. On last visit, BuSpar was changed to 15 mg tablet take 1 tablet 3 times daily, Depakote 500 mg tablet take 1 tablet twice daily, and Seroquel 100 mg tablet 1-1/2 tablets at bedtime for paranoia. On today's visit, she is requesting to reduce Depakote again due to tremor and to stop Seroquel and try another antipsychotic. It is noted patient is prescribed gabapentin 300 mg capsule take 1 capsule 3 times daily by Dr. Mikey Singh. Patient presents to the clinic accompanied by her . 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 She propels herself well in a wheelchair. Patient states \"it's good exercise for me. \"  Patient reports feeling mildly depressed due to her living situation with her daughter and son-in-law. She reports her daughter drinks alcohol regularly and her son-in-law is very particular about everything. Patient reports that it is very difficult for her to live in the home with them. She reports that she is 90% independent and has a nurse to come in to assist her once a week. When questioned about recent weight, patient reports that she has not weighed herself recently however she reports that she has probably gained a lot of weight.   She has reduced appetite due to stomach issues-nausea and vomiting. Sleep is fluctuating which she relates to seeing people in her room almost nightly for over the past month. It is noted patient has history of psychosis. No mood swings or manic episodes noted. No observations of patient responding to internal stimuli and she denies on direct questioning. No suicidal/homicidal thinking noted, risk for suicide is low, protective factor-family. Patient denies smoking, alcohol and drug use. She reports good support especially from her  of 64 years. Mild tremor noted to both hands. Review of Systems   Respiratory: Positive for shortness of breath. Cardiovascular: Positive for leg swelling. Gastrointestinal: Positive for nausea and vomiting. Genitourinary:        Chronic UTIs   Musculoskeletal: Positive for joint pain, myalgias and neck pain. Neurological: Positive for tremors. Psychiatric/Behavioral: Positive for depression. All other systems reviewed and are negative. There were no vitals taken for this visit. Physical Exam  Psychiatric:         Attention and Perception: Attention and perception normal.         Mood and Affect: Mood is depressed. Speech: Speech normal.         Behavior: Behavior normal.         Thought Content: Thought content normal.         Cognition and Memory: Cognition normal.         Judgment: Judgment normal.          Plan:    Reduce Depakote to 500 mg tablet take 1 tablet at bedtime for mood. Depakote reduced due to hand tremors. She may continue BuSpar 15 mg tablet take 1 tablet 3 times daily. Discontinue Seroquel and start risperidone 0.5 mg tablet take 1 tablet at bedtime for psychotic symptoms. Side effect profile discussed. Advised patient to avoid alcohol and drug use. Follow-up with medical provider as appropriate. For emergencies-call 911 or go to the emergency department for suicidal/homicidal thinking. Patient may call the clinic for any issues.

## 2021-04-30 NOTE — TELEPHONE ENCOUNTER
Patient thinks she has cellulitis in her left leg. Red, hot, and it looks like little blisters forming.  [phone]

## 2021-05-03 NOTE — TELEPHONE ENCOUNTER
PATIENT CALLS TO SAY HER LEG IS IMPROVING. SHE SAID YOU ASKED HER TO CALL TO REPORT THE STATUS.  AGUSTIN JOSE LPN

## 2021-05-04 NOTE — TELEPHONE ENCOUNTER
Patient reports poor sleep for the past few nights. She is requesting a sleep aid. She reports that she has some old trazodone but it is . I will send in new prescription for trazodone 50 mg tablet take 1 tablet at bedtime as needed for sleep. Length of telephone call in 3 minutes and 25 seconds.

## 2021-05-13 NOTE — PROGRESS NOTES
Hossein Lenz is a 77 y.o. female who presents today for the following: Chief Complaint Patient presents with  Follow-up \"I'm seeing people walking around my bed. \"  
 Medication Management \"Can you increase Trazodone to 100 mg?\"  Depression  Bipolar Allergies Allergen Reactions  Succinylcholine Chloride Unknown (comments) Other reaction(s): Other (See Comments) Chest pain radiating into neck  Codeine Unknown (comments) Other reaction(s): Other (See Comments) Abdominal pain unless \"synthetic Codeine\"  Hydromorphone Unknown (comments) Patient states it makes her not stop talking, jittery  Hydromorphone (Bulk) Other (comments) Makes crazy  Prednisone Unknown (comments) Other reaction(s): Other (See Comments) \"It turns me into a witch,makes me mean\"  Topiramate Other (comments) Current Outpatient Medications Medication Sig  
 traZODone (DESYREL) 100 mg tablet Take 1 Tab by mouth nightly as needed for Sleep for up to 90 days.  furosemide (LASIX) 40 mg tablet TAKE 2 TABLETS BY MOUTH EVERY DAY  busPIRone (BUSPAR) 15 mg tablet Take 1 Tab by mouth three (3) times daily for 90 days.  gabapentin (NEURONTIN) 300 mg capsule Take 1 Cap by mouth three (3) times daily. Max Daily Amount: 900 mg.  
 multivitamin (MULTIPLE VITAMIN PO) Take 1 Tab by mouth daily.  divalproex DR (DEPAKOTE) 500 mg tablet Take 1 Tab by mouth two (2) times a day for 90 days.  gabapentin (NEURONTIN) 300 mg capsule TAKE 1 CAP BY MOUTH THREE (3) TIMES DAILY. MAX DAILY AMOUNT 3 CAPSULES  
 Eliquis 2.5 mg tablet TAKE 1 TABLET TWICE DAILY  acetaminophen (TYLENOL) 500 mg tablet Take 1 Tab by mouth every six (6) hours as needed for Pain.  fexofenadine (Allegra Allergy) 180 mg tablet Take 1 Tab by mouth daily.  omega-3 fatty acids (Fish Oil Concentrate) 1,000 mg cap Take 1 Tab by mouth daily.   
 magnesium oxide (MAG-OX) 400 mg tablet Take 1 Tab by mouth daily.  b complex-vitamin c-folic acid 0.8 mg (Isabel-Seth) 0.8 mg tab tablet Take 1 Tab by mouth daily.  amiodarone (CORDARONE) 200 mg tablet Take 1 Tab by mouth daily.  cranberry extract 425 mg cap Take 425 mg by mouth two (2) times a day.  ibuprofen (MOTRIN) 200 mg tablet Take 400 mg by mouth daily as needed.  levothyroxine (SYNTHROID) 200 mcg tablet Take 1 Tab by mouth daily.  ferrous sulfate 325 mg (65 mg iron) tablet TAKE 1 TABLET EVERY DAY  furosemide (LASIX) 20 mg tablet Take 2 Tabs by mouth daily.  baclofen (LIORESAL) 10 mg tablet TAKE 1 TABLET THREE TIMES DAILY No current facility-administered medications for this visit. Past Medical History:  
Diagnosis Date  CHF (congestive heart failure) ([facility] [address]  Chronic neck and back pain  Frequent UTI  Hypercholesteremia  Hypertension  Obese Past Surgical History:  
Procedure Laterality Date  HX ABOVE KNEE AMPUTATION    
 right  HX CERVICAL FUSION    
 HX CHOLECYSTECTOMY  HX GASTRIC BYPASS  HX HYSTERECTOMY  HX ORTHOPAEDIC    
 neck surgery  HX PARTIAL THYROIDECTOMY No family history on file. Social History Socioeconomic History  Marital status:  Spouse name: Not on file  Number of children: 4  
 Years of education: Not on file  Highest education level: Some college, no degree Occupational History  Not on file Social Needs  Financial resource strain: Not hard at all  Food insecurity Worry: Never true Inability: Never true  Transportation needs Medical: No  
  Non-medical: No  
Tobacco Use  Smoking status: Never Smoker  Smokeless tobacco: Never Used Substance and Sexual Activity  Alcohol use: Not Currently Comment: rare  Drug use: Never  Sexual activity: Not Currently Lifestyle  Physical activity Days per week: Not on file Minutes per session: Not on file  Stress: Not on file Relationships  Social connections Talks on phone: Not on file Gets together: Not on file Attends Jainism service: Not on file Active member of club or organization: Not on file Attends meetings of clubs or organizations: Not on file Relationship status: Not on file  Intimate partner violence Fear of current or ex partner: Not on file Emotionally abused: Not on file Physically abused: Not on file Forced sexual activity: Not on file Other Topics Concern  Not on file Social History Narrative  Not on file Mrs. Jonah Nix follows up in clinic for major depression and anxiety disorder. She has history of bipolar depression and psychotic symptoms. Patient last visited the clinic April 29, 2021 and was started on risperidone 0.5 mg tablet take 1 tablet at bedtime and Seroquel was discontinued. She continues Depakote 500 mg tablet take 1 tablet at bedtime, trazodone 50 mg tablet take 1 tablet at bedtime as needed for sleep and BuSpar 15 mg tablet take 1 tablet 3 times daily. Patient presents to the clinic accompanied by her  who sat in the lobby during her visit. She is clean, fully alert and oriented. Patient uses wheelchair for mobility and self propels, it is noted patient has right leg amputation. She presents mildly depressed and anxious without suicidal/homicidal thinking, risk for suicide is low-protective factor-family. Patient reports that her daughter does not want her on risperidone. Initially, patient reports that she was seeing people walking in her room and later during the visit patient reports that hallucinations have improved since last visit, so she does not need an antipsychotic at this time. She is requesting to increase trazodone to help her sleep and continue Depakote as prescribed. No psychotic symptoms noted during interview. Appetite-stable.   She mentions that she has a follow-up appointment with Dr. Lorna Vásquez for blood work.  Patient reports that she has fluid on her left knee and started to have problems with her rotator cuff again. She plans to talk with Dr. Krysten Sagastume as appropriate. Patient mentions having issues with her daughter's  and feels that she is unable to do anything right. She feels that her daughter and son-in-law treats her  very well and always ridicule her. Patient lives in the home with her , daughter and son-in-law in a stable environment, however she feels lack of emotional family support. Counseling recommended. Review of Systems Cardiovascular: Positive for leg swelling. Musculoskeletal: Positive for back pain, joint pain, myalgias and neck pain. Psychiatric/Behavioral: Positive for depression and hallucinations. The patient is nervous/anxious and has insomnia. All other systems reviewed and are negative. Visit Vitals Wt 117.9 kg (260 lb) BMI 39.53 kg/m² Physical Exam 
Psychiatric:     
   Attention and Perception: Attention and perception normal.     
   Mood and Affect: Mood is anxious and depressed. Speech: Speech normal.     
   Behavior: Behavior normal. Behavior is cooperative. Thought Content: Thought content normal.     
   Cognition and Memory: Memory is impaired (mild). Judgment: Judgment normal.  
 
  
 
 
Plan: 
 
Discontinue risperidone. Continue Depakote 500 mg tablet take 1 tablet at bedtime and BuSpar 15 mg tablet take 1 tablet 3 times daily. Patient may have trazodone 100 mg tablet take 1 tablet at bedtime as needed for sleep. Follow-up with medical provider as appropriate. Advised patient to avoid alcohol and drug use. For emergencies-call 911 or go to the emergency department for suicidal/homicidal thinking.

## 2021-05-18 NOTE — PROGRESS NOTES
Rosa Rosas presents today for Chief Complaint Patient presents with  (LUTS) Lower Urinary Tract Symptoms Is someone accompanying this pt?  Is the patient using any DME equipment during OV? wheelchair Depression Screening: 
3 most recent PHQ Screens 5/18/2021 PHQ Not Done Active Diagnosis of Depression or Bipolar Disorder Little interest or pleasure in doing things - Feeling down, depressed, irritable, or hopeless - Total Score PHQ 2 - Learning Assessment: 
Learning Assessment 9/17/2020 PRIMARY LEARNER Patient HIGHEST LEVEL OF EDUCATION - PRIMARY LEARNER  GRADUATED HIGH SCHOOL OR GED  
BARRIERS PRIMARY LEARNER NONE  
CO-LEARNER CAREGIVER No  
PRIMARY LANGUAGE ENGLISH  
LEARNER PREFERENCE PRIMARY DEMONSTRATION  
ANSWERED BY patient RELATIONSHIP SELF Abuse Screening: 
Abuse Screening Questionnaire 5/18/2021 Do you ever feel afraid of your partner? Annabelle Davison Are you in a relationship with someone who physically or mentally threatens you? Annabelle Davison Is it safe for you to go home? Wayne Lesch Fall Risk Fall Risk Assessment, last 12 mths 5/18/2021 Able to walk? No  
 
 
ADL ADL Assessment 5/18/2021 Feeding yourself Help Needed Getting from bed to chair Help Needed Getting dressed Help Needed Bathing or showering Help Needed Walk across the room (includes cane/walker) Help Needed Using the telphone Help Needed Taking your medications Help Needed Preparing meals Help Needed Managing money (expenses/bills) Help Needed Moderately strenuous housework (laundry) Help Needed Shopping for personal items (toiletries/medicines) Help Needed Shopping for groceries Help Needed Driving Help Needed Climbing a flight of stairs Help Needed Getting to places beyond walking distances Help Needed Health Maintenance reviewed and discussed and ordered per Provider. Health Maintenance Due Topic Date Due  
 Hepatitis C Screening  Never done  A1C test (Diabetic or Prediabetic)  Never done  COVID-19 Vaccine (1) Never done  DTaP/Tdap/Td series (1 - Tdap) Never done  Lipid Screen  Never done  Shingrix Vaccine Age 50> (1 of 2) Never done  Colorectal Cancer Screening Combo  Never done  Breast Cancer Screen Mammogram  Never done  Bone Densitometry (Dexa) Screening  Never done  Pneumococcal 65+ years (1 of 1 - PPSV23) Never done  Medicare Yearly Exam  Never done Robbi Romero Coordination of Care: 1. Have you been to the ER, urgent care clinic since your last visit? Hospitalized since your last visit? no 
 
2. Have you seen or consulted any other health care providers outside of the [address] [place] [address] since your last visit? Include any pap smears or colon screening. NP in [place]

## 2021-05-18 NOTE — PROGRESS NOTES
Ailyn Lanza is a 77 y.o. female presenting for sx. Of  UTI Chief Complaint Patient presents with  (LUTS) Lower Urinary Tract Symptoms HPI called to be seen about a urinary tract infection. Dysuria frequency and pressure sensation in the suprapubic region. He feels much like previous urinary tract infections that she has had and generally responded to antibiotics. No fevers or chills no back pain no nausea or vomiting. She is trying to avoid winding up in the hospital. 
While she was here she decided to try and play on my sympathy she says.  would like to get something for her right shoulder. She has been told in the past she has a torn rotator cuff. She has had a previous fracture there. She takes 200 of ibuprofen 3 times a day with only partial relief. She would like a stronger pain medicine even though she knows its against policy. We had a long discussion about that. She is not interested in an injection or physical therapy she just wants something to control her pain so that she can live out her last few days in peace. I understood but told her I would like to explore other options first.   
 
Past Medical History:  
Diagnosis Date  CHF (congestive heart failure) ([facility] [address]  Chronic neck and back pain  Frequent UTI  Hypercholesteremia  Hypertension  Obese Current Outpatient Medications on File Prior to Visit Medication Sig Dispense Refill  FeroSuL 325 mg (65 mg iron) tablet Take 1 Tab by mouth daily.  traZODone (DESYREL) 100 mg tablet Take 1 Tab by mouth nightly as needed for Sleep for up to 90 days. 90 Tab 0  
 furosemide (LASIX) 40 mg tablet TAKE 2 TABLETS BY MOUTH EVERY DAY 60 Tab 5  
 busPIRone (BUSPAR) 15 mg tablet Take 1 Tab by mouth three (3) times daily for 90 days. 270 Tab 0  
 gabapentin (NEURONTIN) 300 mg capsule Take 1 Cap by mouth three (3) times daily.  Max Daily Amount: 900 mg. 90 Cap 3  
 multivitamin (MULTIPLE VITAMIN PO) Take 1 Tab by mouth daily.  divalproex DR (DEPAKOTE) 500 mg tablet Take 1 Tab by mouth two (2) times a day for 90 days. 180 Tab 0  
 Eliquis 2.5 mg tablet TAKE 1 TABLET TWICE DAILY 60 Tab 5  
 acetaminophen (TYLENOL) 500 mg tablet Take 1 Tab by mouth every six (6) hours as needed for Pain. 60 Tab 1  
 fexofenadine (Allegra Allergy) 180 mg tablet Take 1 Tab by mouth daily.  omega-3 fatty acids (Fish Oil Concentrate) 1,000 mg cap Take 1 Tab by mouth daily.  magnesium oxide (MAG-OX) 400 mg tablet Take 1 Tab by mouth daily.  b complex-vitamin c-folic acid 0.8 mg (Isabel-Seth) 0.8 mg tab tablet Take 1 Tab by mouth daily.  amiodarone (CORDARONE) 200 mg tablet Take 1 Tab by mouth daily.  cranberry extract 425 mg cap Take 425 mg by mouth two (2) times a day.  ibuprofen (MOTRIN) 200 mg tablet Take 400 mg by mouth daily as needed.  levothyroxine (SYNTHROID) 200 mcg tablet Take 1 Tab by mouth daily.  baclofen (LIORESAL) 10 mg tablet TAKE 1 TABLET THREE TIMES DAILY 270 Tab 2  
 [DISCONTINUED] gabapentin (NEURONTIN) 300 mg capsule TAKE 1 CAP BY MOUTH THREE (3) TIMES DAILY. MAX DAILY AMOUNT 3 CAPSULES 90 Cap 0  
 [DISCONTINUED] ferrous sulfate 325 mg (65 mg iron) tablet TAKE 1 TABLET EVERY DAY 90 Tab 2  
 [DISCONTINUED] furosemide (LASIX) 20 mg tablet Take 2 Tabs by mouth daily. 180 Tab 3 No current facility-administered medications on file prior to visit. 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 
 
Assessment & Plan he probably does have a rotator cuff tear and is not interested in surgery not interested in injection just wants something for the pain. She was amenable to trying an increased dose of ibuprofen and we will send in a prescription for that.  
 
 
 
Delane Riedel., MD

## 2021-06-03 NOTE — PROGRESS NOTES
Olivia Pedroza is a 77 y.o. female presenting for dermatitis and diffuse pain Chief Complaint Patient presents with  Rash HPI going to called requesting to be seen on an acute basis today along with her  since he had an appointment. First of all she has some lesions underneath her panniculus and underneath both breasts. I think she kind of warts home health referral to help her take care of these but on looking at them I think you are just superficial dermis tightness secondary to Yeast  and hopefully some cream will take care of them. Next she desperately feels like she needs some pain medication. Her right shoulder hurts her at all times as does her back and her left leg especially her knee. She has tried everything over-the-counter under the sun as well as some baclofen and cyclobenzaprine and nonsteroidal anti-inflammatories both OTC and prescription. She has tried Voltaren gel. She asks if she could please have pain medication which she took for years and years in [place] and she says she never gave out all of her medicines early and never overtook her medicines. Past Medical History:  
Diagnosis Date  CHF (congestive heart failure) ([facility] [address]  Chronic neck and back pain  Frequent UTI  Hypercholesteremia  Hypertension  Obese Current Outpatient Medications on File Prior to Visit Medication Sig Dispense Refill  FeroSuL 325 mg (65 mg iron) tablet Take 1 Tab by mouth daily.  divalproex DR (DEPAKOTE) 500 mg tablet Take 1 Tab by mouth daily for 90 days. 90 Tab 0  ibuprofen (MOTRIN) 600 mg tablet Take 1 Tab by mouth every eight (8) hours as needed for Pain. 90 Tab 3  
 traZODone (DESYREL) 100 mg tablet Take 1 Tab by mouth nightly as needed for Sleep for up to 90 days.  90 Tab 0  
 furosemide (LASIX) 40 mg tablet TAKE 2 TABLETS BY MOUTH EVERY DAY 60 Tab 5  
 busPIRone (BUSPAR) 15 mg tablet Take 1 Tab by mouth three (3) times daily for 90 days. 270 Tab 0  
 gabapentin (NEURONTIN) 300 mg capsule Take 1 Cap by mouth three (3) times daily. Max Daily Amount: 900 mg. 90 Cap 3  
 multivitamin (MULTIPLE VITAMIN PO) Take 1 Tab by mouth daily.  Eliquis 2.5 mg tablet TAKE 1 TABLET TWICE DAILY 60 Tab 5  
 acetaminophen (TYLENOL) 500 mg tablet Take 1 Tab by mouth every six (6) hours as needed for Pain. 60 Tab 1  
 fexofenadine (Allegra Allergy) 180 mg tablet Take 1 Tab by mouth daily.  omega-3 fatty acids (Fish Oil Concentrate) 1,000 mg cap Take 1 Tab by mouth daily.  magnesium oxide (MAG-OX) 400 mg tablet Take 1 Tab by mouth daily.  b complex-vitamin c-folic acid 0.8 mg (Isabel-Seth) 0.8 mg tab tablet Take 1 Tab by mouth daily.  amiodarone (CORDARONE) 200 mg tablet Take 1 Tab by mouth daily.  cranberry extract 425 mg cap Take 425 mg by mouth two (2) times a day.  levothyroxine (SYNTHROID) 200 mcg tablet Take 1 Tab by mouth daily.  baclofen (LIORESAL) 10 mg tablet TAKE 1 TABLET THREE TIMES DAILY 270 Tab 2  ibuprofen (MOTRIN) 200 mg tablet Take 400 mg by mouth daily as needed. No current facility-administered medications on file prior to visit. ROS as noted in the history of present illness Visit Vitals /88 Resp 20 Physical Exam morbidly obese  woman seated in wheelchair no acute distress normocephalic lungs clear heart irregularly irregular she has protrusion is lesions in her area below her breasts and below her panniculus. It just looks like a superficial fungal infection and I am going to try some clotrimazole twice a day for 2 weeks and see her back in 2 weeks. In the meantime I told her once again that I would be more comfortable if she went to pain management. She tried Dr. Sonia Canela and did not like her at all and I am aware of pain management clinic that [facility] in [facility] which would be closer to them anyway.   She says she was in pain management in [address] for many years and so I told her I thought that would be the best route to go rather than me prescribe narcotics. Assessment & Plan Ramakrishna De La Torre., MD

## 2021-06-08 NOTE — TELEPHONE ENCOUNTER
I spoke to representative at Dr. Mcgee Daily office (Pain management) to try to schedule patient for pain management. They will not schedule without reviewing records. I obtained correct fax number and sent records from [place] and [facility]. Called patient to let her know that they would not schedule until records are reviewed and Dr. Paulino Isaac will be out of the country the first two weeks of July so they are looking at 3rd week in July if he will see her at all. She asked if you could prescribe her something and went on to tell me how badly she is hurting and what she has tried. I told her that I would have you call her.   She can be reached at [phone]

## 2021-06-16 NOTE — TELEPHONE ENCOUNTER
Patient calls say that the cltrimazole-betamethasone 1% cream that you prescribed for the areas under her breasts and in her groin worked for under her breasts but it didn't do anything for the areas in her groin. She wonders if there is anything else that you can send in, or does she have to wait until her appointment on June 23 to discuss this.   She can be reached at [phone]

## 2021-06-23 PROBLEM — N17.9 AKI (ACUTE KIDNEY INJURY) (HCC): Status: ACTIVE | Noted: 2021-01-01

## 2021-06-23 PROBLEM — N17.9 ACUTE RENAL FAILURE (ARF) (HCC): Status: ACTIVE | Noted: 2021-01-01

## 2021-06-23 NOTE — H&P
Hospitalist Admission Note    NAME: Augusta Marion   :     MRN:  [id number]     Date/Time:  2021 3:21 PM    Patient PCP: Tori Moeller MD  ______________________________________________________________________  Given the patient's current clinical presentation, I have a high level of concern for decompensation if discharged from the emergency department. Complex decision making was performed, which includes reviewing the patient's available past medical records, laboratory results, and x-ray films. My assessment of this patient's clinical condition and my plan of care is as follows. Assessment / Plan:    [place] (acute kidney injury) ([address]  Admit, aggressive ivf, repeat Cr in am  - adjust meds to renal dosing    UTI (urinary tract infection)  - send urine cult, start dialy ceftriaxone  - due ot hx cdif, ensure to taper off abx as soon as possible    Atrial fibrillation   Cont eliquis and amiodarone    Bipolar I disorder, current episode depressed (HCC)  Cont buspar, depakote, trazodone    Essential (primary) hypertension  Holding lasix due to MON[place]NA, trend    Phantom limb pain ([address]  - holding neurontin until renal function improves    Postoperative hypothyroidism  - cont synthroid          Subjective:   CHIEF COMPLAINT: decreased urine outpt    HISTORY OF PRESENT ILLNESS:     Augusta Marion, 77 y.o. female with a past medical history significant diabetes, hypertension, hyperlipidemia, obesity and bipolar disorder presents to the ED with cc of possible dehydration. Patient states she has not urinated since last evening. She was seen by her PCP today and was sent over here for evaluation. He states she has been drinking fluids. She says she feels weak and tired. She denies any chest pain or shortness of breath she denies fevers or chills.   It sounds like she could have been having some overflow incontinence because she took a nap in the chair and woke up in she and the chair were saturated. She has not been able to urinate since. She is tremulous and shaking which she thinks is the same thing she did when she became hyponatremic on the verge of seizures in the past.  She does take furosemide for her chronic lower extremity edema. She feels like she is dehydrated. Her cr is found to be quite elevated, tells me she has had chf in past when given too much ivf. She confirms dnr/dni, she would not want HD. We were asked to admit for work up and evaluation of the above problems. Past Medical History:   Diagnosis Date    Bipolar 1 disorder, depressed ([facility] [address]     [facility]cong[facility]     Chronic neck and back pain     Frequent UTI     Hypercholesteremia     Hypertension     Obese     Psychiatric disorder         Past Surgical History:   Procedure Laterality Date    HX ABOVE KNEE AMPUTATION      right    HX CERVICAL FUSION      HX CHOLECYSTECTOMY      HX GASTRIC BYPASS      HX HYSTERECTOMY      HX ORTHOPAEDIC      neck surgery    HX PARTIAL THYROIDECTOMY         Social History     Tobacco Use    Smoking status: Never Smoker    Smokeless tobacco: Never Used   Substance Use Topics    Alcohol use: Not Currently     Comment: rare        History reviewed. No pertinent family history. Allergies   Allergen Reactions    Succinylcholine Chloride Unknown (comments)     Other reaction(s): Other (See Comments)  Chest pain radiating into neck    Codeine Unknown (comments)     Other reaction(s): Other (See Comments)  Abdominal pain unless \"synthetic Codeine\"    Hydromorphone Unknown (comments)     Patient states it makes her not stop talking, jittery      Hydromorphone (Bulk) Other (comments)     Makes crazy      Prednisone Unknown (comments)     Other reaction(s): Other (See Comments)    \"It turns me into a witch,makes me mean\"    Topiramate Other (comments)        Prior to Admission medications    Medication Sig Start Date End Date Taking? Authorizing Provider   levothyroxine (SYNTHROID) 200 mcg tablet TAKE 1 TABLET EVERY DAY 6/22/21   Letitia Moeller MD   gabapentin (NEURONTIN) 300 mg capsule Take 1 Capsule by mouth three (3) times daily. Max Daily Amount: 900 mg. 6/22/21   Letitia Moeller MD   trihexyphenidyL (ARTANE) 2 mg tablet Take 1 Tablet by mouth daily for 30 days. 6/22/21 7/22/21  Wili Santos NP   amiodarone (CORDARONE) 200 mg tablet TAKE 1 TABLET EVERY DAY 6/17/21   Letitia Moeller MD   clotrimazole-betamethasone (LOTRISONE) topical cream Bathe and dry affected area and then apply cream twice daily for 2 weeks 6/3/21   Letitia Moeller MD   FeroSuL 325 mg (65 mg iron) tablet Take 1 Tab by mouth daily. 2/24/21   Provider, Historical   divalproex DR (DEPAKOTE) 500 mg tablet Take 1 Tab by mouth daily for 90 days. 5/18/21 8/16/21  Letitia Moeller MD   ibuprofen (MOTRIN) 600 mg tablet Take 1 Tab by mouth every eight (8) hours as needed for Pain. 5/18/21   Letitia Moeller MD   traZODone (DESYREL) 100 mg tablet Take 1 Tab by mouth nightly as needed for Sleep for up to 90 days. 5/13/21 8/11/21  Wili Santos NP   furosemide (LASIX) 40 mg tablet TAKE 2 TABLETS BY MOUTH EVERY DAY 5/11/21   Letitia Moelelr MD   busPIRone (BUSPAR) 15 mg tablet Take 1 Tab by mouth three (3) times daily for 90 days. 4/29/21 7/28/21  Wili Santos NP   multivitamin (MULTIPLE VITAMIN PO) Take 1 Tab by mouth daily. Provider, Historical   Eliquis 2.5 mg tablet TAKE 1 TABLET TWICE DAILY 12/16/20   Letitia Moeller MD   acetaminophen (TYLENOL) 500 mg tablet Take 1 Tab by mouth every six (6) hours as needed for Pain. 10/15/20   Latisha Zamorano NP   fexofenadine (Allegra Allergy) 180 mg tablet Take 1 Tab by mouth daily. Other, MD Mariama   omega-3 fatty acids (Fish Oil Concentrate) 1,000 mg cap Take 1 Tab by mouth daily. Other, MD Mariama   magnesium oxide (MAG-OX) 400 mg tablet Take 1 Tab by mouth daily.     Other, MD Mariama   b complex-vitamin c-folic acid 0.8 mg (Isabel-Seth) 0.8 mg tab tablet Take 1 Tab by mouth daily. Mariama Saini MD   cranberry extract 425 mg cap Take 425 mg by mouth two (2) times a day. Mariama Saini MD   ibuprofen (MOTRIN) 200 mg tablet Take 400 mg by mouth daily as needed. Mariama Saini MD   baclofen (LIORESAL) 10 mg tablet TAKE 1 TABLET THREE TIMES DAILY 9/23/20   Ankur Moeller MD       REVIEW OF SYSTEMS:     I am not able to complete the review of systems because:    The patient is intubated and sedated    The patient has altered mental status due to his acute medical problems    The patient has baseline aphasia from prior stroke(s)    The patient has baseline dementia and is not reliable historian    The patient is in acute medical distress and unable to provide information           Total of 12 systems reviewed as follows:       POSITIVE= underlined text  Negative = text not underlined  General:  fever, chills, sweats, generalized weakness, weight loss/gain,      loss of appetite   Eyes:    blurred vision, eye pain, loss of vision, double vision  ENT:    rhinorrhea, pharyngitis   Respiratory:   cough, sputum production, SOB, WILLIAMSON, wheezing, pleuritic pain   Cardiology:   chest pain, palpitations, orthopnea, PND, edema, syncope   Gastrointestinal:  abdominal pain , N/V, diarrhea, dysphagia, constipation, bleeding   Genitourinary:  Decreased urine outpt  Muskuloskeletal :  arthralgia, myalgia, back pain  Hematology:  easy bruising, nose or gum bleeding, lymphadenopathy   Dermatological: rash, ulceration, pruritis, color change / jaundice  Endocrine:   hot flashes or polydipsia   Neurological:  Twitching, cloudy mental status  Psychological: Feelings of anxiety, depression, agitation    Objective:   VITALS:    Visit Vitals  BP (!) 116/50 (BP 1 Location: Left upper arm, BP Patient Position: At rest)   Pulse 68   Temp 98.2 °F (36.8 °C)   Resp 18   Ht 5' 9\" (1.753 m)   Wt 117.9 kg (260 lb)   SpO2 97%   BMI 38.40 kg/m²       PHYSICAL EXAM:    General:    Alert, cooperative, no distress, appears stated age. Skin tenting to hands  HEENT: Atraumatic, anicteric sclerae, pink conjunctivae     No oral ulcers, mucosa moist, throat clear, dentition fair  Neck:  Supple, symmetrical,  thyroid: non tender  Lungs:   Clear to auscultation bilaterally. No Wheezing or Rhonchi. No rales. Chest wall:  No tenderness  No Accessory muscle use. Heart:   Regular  rhythm,  No  murmur   + edema to LLE  Abdomen:   Soft, non-tender. Not distended. Bowel sounds normal  Extremities: r aka  Skin:     Not pale. Not Jaundiced  No rashes   Psych:  Good insight. Not depressed. Not anxious or agitated. Neurologic: EOMs intact. No facial asymmetry. No aphasia or slurred speech. Symmetrical strength, Sensation grossly intact. Alert and oriented X 4.     _______________________________________________________________________  Care Plan discussed with:    Comments   Patient x    Family  x    RN     Care Manager                    Consultant:      _______________________________________________________________________  Expected  Disposition:   Home with Family x   HH/PT/OT/RN    SNF/LTC    TIFFANIE    ________________________________________________________________________  TOTAL TIME:  39 Minutes    Critical Care Provided     Minutes non procedure based      Comments    x Reviewed previous records   >50% of visit spent in counseling and coordination of care x Discussion with patient and/or family and questions answered       ________________________________________________________________________  Signed: John Hernandez MD    Procedures: see electronic medical records for all procedures/Xrays and details which were not copied into this note but were reviewed prior to creation of Plan.     LAB DATA REVIEWED:    Recent Results (from the past 24 hour(s))   CBC WITH AUTOMATED DIFF    Collection Time: 06/23/21  1:25 PM   Result Value Ref Range WBC 9.3 4.6 - 13.2 K/uL    RBC 3.18 (L) 4.20 - 5.30 M/uL    HGB 11.1 (L) 12.0 - 16.0 g/dL    HCT 36.3 35.0 - 45.0 %    .2 (H) 74.0 - 97.0 FL    MCH 34.9 (H) 24.0 - 34.0 PG    MCHC 30.6 (L) 31.0 - 37.0 g/dL    RDW 12.8 11.6 - 14.5 %    PLATELET 641 809 - 141 K/uL    MPV 10.1 9.2 - 11.8 FL    NEUTROPHILS 69 40 - 73 %    LYMPHOCYTES 21 21 - 52 %    MONOCYTES 9 3 - 10 %    EOSINOPHILS 1 0 - 5 %    BASOPHILS 0 0 - 2 %    IMMATURE GRANULOCYTES 0 %    ABS. NEUTROPHILS 6.4 1.8 - 8.0 K/UL    ABS. LYMPHOCYTES 2.0 0.9 - 3.6 K/UL    ABS. MONOCYTES 0.8 0.05 - 1.2 K/UL    ABS. EOSINOPHILS 0.1 0.0 - 0.4 K/UL    ABS. BASOPHILS 0.0 0.0 - 0.1 K/UL    ABS. IMM. GRANS. 0.0 K/UL    RBC COMMENTS Anisocytosis  1+        RBC COMMENTS Rouleaux  1+       METABOLIC PANEL, COMPREHENSIVE    Collection Time: 06/23/21  1:25 PM   Result Value Ref Range    Sodium 142 135 - 145 mmol/L    Potassium 4.2 3.2 - 5.1 mmol/L    Chloride 105 94 - 111 mmol/L    CO2 23 21 - 33 mmol/L    Anion gap 14 mmol/L    Glucose 88 70 - 110 mg/dL    BUN 75 (H) 9 - 21 mg/dL    Creatinine 3.20 (H) 0.70 - 1.20 mg/dL    BUN/Creatinine ratio 23      GFR est AA 17 ml/min/1.73m2    GFR est non-AA 14 ml/min/1.73m2    Calcium 8.6 8.5 - 10.5 mg/dL    Bilirubin, total 0.6 0.2 - 1.0 mg/dL    AST (SGOT) 20 14 - 74 U/L    ALT (SGPT) 18 3 - 52 U/L    Alk.  phosphatase 65 38 - 126 U/L    Protein, total 6.5 6.1 - 8.4 g/dL    Albumin 3.4 (L) 3.5 - 4.7 g/dL    Globulin 3.1 g/dL    A-G Ratio 1.1     LIPASE    Collection Time: 06/23/21  1:25 PM   Result Value Ref Range    Lipase 39 10 - 57 U/L   MAGNESIUM    Collection Time: 06/23/21  1:25 PM   Result Value Ref Range    Magnesium 2.3 1.7 - 2.8 mg/dL   URINALYSIS W/ RFLX MICROSCOPIC    Collection Time: 06/23/21  1:25 PM   Result Value Ref Range    Color Claudette      Appearance Turbid (A) Clear      Specific gravity 1.020 1.003 - 1.035      pH (UA) 6.0 5.0 - 9.0      Protein 100 (A) Negative mg/dL    Glucose Negative Negative mg/dL Ketone Negative Negative mg/dL    Bilirubin Negative Negative      Blood Large (A) Negative      Urobilinogen 0.2 0.2 - 1.0 EU/dL    Nitrites Positive (A) Negative      Leukocyte Esterase Large (A) Negative     URINE MICROSCOPIC    Collection Time: 06/23/21  1:25 PM   Result Value Ref Range    WBC >100 (H) 0 - 4 /hpf    RBC 0-5 0 - 2 /hpf    Bacteria 1+ (A) None /hpf

## 2021-06-23 NOTE — H&P
Hospitalist Admission Note    NAME: Federico Robles   :     MRN:  [id number]     Date/Time:  2021 3:11 PM    Patient PCP: Letitia Moeller., MD  ______________________________________________________________________  Given the patient's current clinical presentation, I have a high level of concern for decompensation if discharged from the emergency department. Complex decision making was performed, which includes reviewing the patient's available past medical records, laboratory results, and x-ray films. My assessment of this patient's clinical condition and my plan of care is as follows. Assessment / Plan:            Subjective:   CHIEF COMPLAINT:     HISTORY OF PRESENT ILLNESS:     Fredis Aguila is a 77 y.o.   female who presents with     We were asked to admit for work up and evaluation of the above problems. Past Medical History:   Diagnosis Date    Bipolar 1 disorder, depressed ([address]     CHF (congestive heart failure) (HCC)     Chronic neck and back pain     Frequent UTI     Hypercholesteremia     Hypertension     Obese     Psychiatric disorder         Past Surgical History:   Procedure Laterality Date    HX ABOVE KNEE AMPUTATION      right    HX CERVICAL FUSION      HX CHOLECYSTECTOMY      HX GASTRIC BYPASS      HX HYSTERECTOMY      HX ORTHOPAEDIC      neck surgery    HX PARTIAL THYROIDECTOMY         Social History     Tobacco Use    Smoking status: Never Smoker    Smokeless tobacco: Never Used   Substance Use Topics    Alcohol use: Not Currently     Comment: rare        History reviewed. No pertinent family history. Allergies   Allergen Reactions    Succinylcholine Chloride Unknown (comments)     Other reaction(s): Other (See Comments)  Chest pain radiating into neck    Codeine Unknown (comments)     Other reaction(s):  Other (See Comments)  Abdominal pain unless \"synthetic Codeine\"    Hydromorphone Unknown (comments)     Patient states it makes her not stop talking, jittery      Hydromorphone (Bulk) Other (comments)     Makes crazy      Prednisone Unknown (comments)     Other reaction(s): Other (See Comments)    \"It turns me into a witch,makes me mean\"    Topiramate Other (comments)        Prior to Admission medications    Medication Sig Start Date End Date Taking? Authorizing Provider   levothyroxine (SYNTHROID) 200 mcg tablet TAKE 1 TABLET EVERY DAY 6/22/21   Max Moeller MD   gabapentin (NEURONTIN) 300 mg capsule Take 1 Capsule by mouth three (3) times daily. Max Daily Amount: 900 mg. 6/22/21   Max Moeller MD   trihexyphenidyL (ARTANE) 2 mg tablet Take 1 Tablet by mouth daily for 30 days. 6/22/21 7/22/21  Reina Jessica NP   amiodarone (CORDARONE) 200 mg tablet TAKE 1 TABLET EVERY DAY 6/17/21   Max Moeller MD   clotrimazole-betamethasone (LOTRISONE) topical cream Bathe and dry affected area and then apply cream twice daily for 2 weeks 6/3/21   Max Moeller MD   FeroSuL 325 mg (65 mg iron) tablet Take 1 Tab by mouth daily. 2/24/21   Provider, Historical   divalproex DR (DEPAKOTE) 500 mg tablet Take 1 Tab by mouth daily for 90 days. 5/18/21 8/16/21  Max Moeller MD   ibuprofen (MOTRIN) 600 mg tablet Take 1 Tab by mouth every eight (8) hours as needed for Pain. 5/18/21   Max Moeller MD   traZODone (DESYREL) 100 mg tablet Take 1 Tab by mouth nightly as needed for Sleep for up to 90 days. 5/13/21 8/11/21  Reina Jessica NP   furosemide (LASIX) 40 mg tablet TAKE 2 TABLETS BY MOUTH EVERY DAY 5/11/21   aMx Moeller MD   busPIRone (BUSPAR) 15 mg tablet Take 1 Tab by mouth three (3) times daily for 90 days. 4/29/21 7/28/21  Reina Jessica NP   multivitamin (MULTIPLE VITAMIN PO) Take 1 Tab by mouth daily.     Provider, Historical   Eliquis 2.5 mg tablet TAKE 1 TABLET TWICE DAILY 12/16/20   Max Moeller MD   acetaminophen (TYLENOL) 500 mg tablet Take 1 Tab by mouth every six (6) hours as needed for Pain. 10/15/20   Daphney Martinez NP   fexofenadine (Allegra Allergy) 180 mg tablet Take 1 Tab by mouth daily. Mariama Saini MD   omega-3 fatty acids (Fish Oil Concentrate) 1,000 mg cap Take 1 Tab by mouth daily. Mariama Saini MD   magnesium oxide (MAG-OX) 400 mg tablet Take 1 Tab by mouth daily. Mariama Saini MD   b complex-vitamin c-folic acid 0.8 mg (Isabel-Seth) 0.8 mg tab tablet Take 1 Tab by mouth daily. Mariama Saini MD   cranberry extract 425 mg cap Take 425 mg by mouth two (2) times a day. Mariama Saini MD   ibuprofen (MOTRIN) 200 mg tablet Take 400 mg by mouth daily as needed. Mariama Saini MD   baclofen (LIORESAL) 10 mg tablet TAKE 1 TABLET THREE TIMES DAILY 9/23/20   Margaux Moeller MD       REVIEW OF SYSTEMS:     I am not able to complete the review of systems because:    The patient is intubated and sedated    The patient has altered mental status due to his acute medical problems    The patient has baseline aphasia from prior stroke(s)    The patient has baseline dementia and is not reliable historian    The patient is in acute medical distress and unable to provide information           Total of 12 systems reviewed as follows:       POSITIVE= underlined text  Negative = text not underlined  General:  fever, chills, sweats, generalized weakness, weight loss/gain,      loss of appetite   Eyes:    blurred vision, eye pain, loss of vision, double vision  ENT:    rhinorrhea, pharyngitis   Respiratory:   cough, sputum production, SOB, WILLIAMSON, wheezing, pleuritic pain   Cardiology:   chest pain, palpitations, orthopnea, PND, edema, syncope   Gastrointestinal:  abdominal pain , N/V, diarrhea, dysphagia, constipation, bleeding   Genitourinary:  frequency, urgency, dysuria, hematuria, incontinence   Muskuloskeletal :  arthralgia, myalgia, back pain  Hematology:  easy bruising, nose or gum bleeding, lymphadenopathy   Dermatological: rash, ulceration, pruritis, color change / jaundice  Endocrine:   hot flashes or polydipsia   Neurological:  headache, dizziness, confusion, focal weakness, paresthesia,     Speech difficulties, memory loss, gait difficulty  Psychological: Feelings of anxiety, depression, agitation    Objective:   VITALS:    Visit Vitals  /72 (BP 1 Location: Right upper arm, BP Patient Position: At rest)   Pulse 68   Temp 98.2 °F (36.8 °C)   Resp 18   Ht 5' 9\" (1.753 m)   Wt 117.9 kg (260 lb)   SpO2 96%   BMI 38.40 kg/m²       PHYSICAL EXAM:    General:    Alert, cooperative, no distress, appears stated age. HEENT: Atraumatic, anicteric sclerae, pink conjunctivae     No oral ulcers, mucosa moist, throat clear, dentition fair  Neck:  Supple, symmetrical,  thyroid: non tender  Lungs:   Clear to auscultation bilaterally. No Wheezing or Rhonchi. No rales. Chest wall:  No tenderness  No Accessory muscle use. Heart:   Regular  rhythm,  No  murmur   No edema  Abdomen:   Soft, non-tender. Not distended. Bowel sounds normal  Extremities: No cyanosis. No clubbing,      Skin turgor normal, Capillary refill normal, Radial dial pulse 2+  Skin:     Not pale. Not Jaundiced  No rashes   Psych:  Good insight. Not depressed. Not anxious or agitated. Neurologic: EOMs intact. No facial asymmetry. No aphasia or slurred speech. Symmetrical strength, Sensation grossly intact.  Alert and oriented X 4.     _______________________________________________________________________  Care Plan discussed with:    Comments   Patient     Family      RN     Care Manager                    Consultant:      _______________________________________________________________________  Expected  Disposition:   Home with Family    HH/PT/OT/RN    SNF/LTC    TIFFANIE    ________________________________________________________________________  TOTAL TIME:  Minutes    Critical Care Provided     Minutes non procedure based      Comments     Reviewed previous records   >50% of visit spent in counseling and coordination of care  Discussion with patient and/or family and questions answered       ________________________________________________________________________  Signed: Jose Chris MD    Procedures: see electronic medical records for all procedures/Xrays and details which were not copied into this note but were reviewed prior to creation of Plan. LAB DATA REVIEWED:    Recent Results (from the past 24 hour(s))   CBC WITH AUTOMATED DIFF    Collection Time: 06/23/21  1:25 PM   Result Value Ref Range    WBC 9.3 4.6 - 13.2 K/uL    RBC 3.18 (L) 4.20 - 5.30 M/uL    HGB 11.1 (L) 12.0 - 16.0 g/dL    HCT 36.3 35.0 - 45.0 %    .2 (H) 74.0 - 97.0 FL    MCH 34.9 (H) 24.0 - 34.0 PG    MCHC 30.6 (L) 31.0 - 37.0 g/dL    RDW 12.8 11.6 - 14.5 %    PLATELET 027 824 - 648 K/uL    MPV 10.1 9.2 - 11.8 FL    NEUTROPHILS 69 40 - 73 %    LYMPHOCYTES 21 21 - 52 %    MONOCYTES 9 3 - 10 %    EOSINOPHILS 1 0 - 5 %    BASOPHILS 0 0 - 2 %    IMMATURE GRANULOCYTES 0 %    ABS. NEUTROPHILS 6.4 1.8 - 8.0 K/UL    ABS. LYMPHOCYTES 2.0 0.9 - 3.6 K/UL    ABS. MONOCYTES 0.8 0.05 - 1.2 K/UL    ABS. EOSINOPHILS 0.1 0.0 - 0.4 K/UL    ABS. BASOPHILS 0.0 0.0 - 0.1 K/UL    ABS. IMM. GRANS. 0.0 K/UL    RBC COMMENTS Anisocytosis  1+        RBC COMMENTS Rouleaux  1+       METABOLIC PANEL, COMPREHENSIVE    Collection Time: 06/23/21  1:25 PM   Result Value Ref Range    Sodium 142 135 - 145 mmol/L    Potassium 4.2 3.2 - 5.1 mmol/L    Chloride 105 94 - 111 mmol/L    CO2 23 21 - 33 mmol/L    Anion gap 14 mmol/L    Glucose 88 70 - 110 mg/dL    BUN 75 (H) 9 - 21 mg/dL    Creatinine 3.20 (H) 0.70 - 1.20 mg/dL    BUN/Creatinine ratio 23      GFR est AA 17 ml/min/1.73m2    GFR est non-AA 14 ml/min/1.73m2    Calcium 8.6 8.5 - 10.5 mg/dL    Bilirubin, total 0.6 0.2 - 1.0 mg/dL    AST (SGOT) 20 14 - 74 U/L    ALT (SGPT) 18 3 - 52 U/L    Alk.  phosphatase 65 38 - 126 U/L    Protein, total 6.5 6.1 - 8.4 g/dL    Albumin 3.4 (L) 3.5 - 4.7 g/dL    Globulin 3.1 g/dL A-G Ratio 1.1     LIPASE    Collection Time: 06/23/21  1:25 PM   Result Value Ref Range    Lipase 39 10 - 57 U/L   MAGNESIUM    Collection Time: 06/23/21  1:25 PM   Result Value Ref Range    Magnesium 2.3 1.7 - 2.8 mg/dL   URINALYSIS W/ RFLX MICROSCOPIC    Collection Time: 06/23/21  1:25 PM   Result Value Ref Range    Color Claudette      Appearance Turbid (A) Clear      Specific gravity 1.020 1.003 - 1.035      pH (UA) 6.0 5.0 - 9.0      Protein 100 (A) Negative mg/dL    Glucose Negative Negative mg/dL    Ketone Negative Negative mg/dL    Bilirubin Negative Negative      Blood Large (A) Negative      Urobilinogen 0.2 0.2 - 1.0 EU/dL    Nitrites Positive (A) Negative      Leukocyte Esterase Large (A) Negative     URINE MICROSCOPIC    Collection Time: 06/23/21  1:25 PM   Result Value Ref Range    WBC >100 (H) 0 - 4 /hpf    RBC 0-5 0 - 2 /hpf    Bacteria 1+ (A) None /hpf

## 2021-06-23 NOTE — PROGRESS NOTES
Problem: Pressure Injury - Risk of  Goal: *Prevention of pressure injury  Description: Document Ethan Scale and appropriate interventions in the flowsheet.   Outcome: Progressing Towards Goal  Note: Pressure Injury Interventions:  Sensory Interventions: Minimize linen layers    Moisture Interventions: Absorbent underpads    Activity Interventions: Increase time out of bed    Mobility Interventions: PT/OT evaluation    Nutrition Interventions: Document food/fluid/supplement intake                     Problem: Patient Education: Go to Patient Education Activity  Goal: Patient/Family Education  Outcome: Progressing Towards Goal

## 2021-06-23 NOTE — ED TRIAGE NOTES
Pt sent by Dr. Ally Urena states that she looks clinically dehydrated. Is changing medications and has not urinated since yesterday. Per pt she is taking 80mg of Lasix and has not voided since 1700 yesterday, feels weak and tired.

## 2021-06-23 NOTE — ASSESSMENT & PLAN NOTE
- send urine cult, start dialy ceftriaxone  - due ot hx cdif, ensure to taper off abx as soon as possible

## 2021-06-23 NOTE — PROGRESS NOTES
TRANSFER - OUT REPORT:    Verbal report given to Roxanne Sanabria RN on Rosa Rosas  being transferred to [address] [place] [address] for routine progression of care       Report consisted of patients Situation, Background, Assessment and   Recommendations(SBAR). Information from the following report(s) SBAR was reviewed with the receiving nurse. Lines:   Peripheral IV 06/23/21 Right Hand (Active)   Site Assessment Clean, dry, & intact 06/23/21 1326   Phlebitis Assessment 0 06/23/21 1326   Infiltration Assessment 0 06/23/21 1326   Dressing Status Clean, dry, & intact 06/23/21 1326   Dressing Type Transparent 06/23/21 1326   Hub Color/Line Status Pink 06/23/21 1326   Alcohol Cap Used Yes 06/23/21 1326        Opportunity for questions and clarification was provided.       Patient transported with:   [address]

## 2021-06-23 NOTE — PROGRESS NOTES
[phone]- Patient complaining of chest pain states, \"it must be the fluids running to fast.\"- Fluids paused. Vitals 127/58 HR 76 O2 97. Doctor called - EKG done and troponin done. Report giving to Nicole Webster RN.

## 2021-06-23 NOTE — Clinical Note
Status[de-identified] INPATIENT [101]   Type of Bed: Medical [8]   Inpatient Hospitalization Certified Necessary for the Following Reasons: 3.  Patient receiving treatment that can only be provided in an inpatient setting (further clarification in H&P documentation)   Admitting Diagnosis: Acute renal failure (ARF) [facility]) [[id number]]   Admitting Diagnosis: UTI (urinary tract infection) [939086]   Admitting Physician: Young Hernández [[id number]]   Attending Physician: Young Hernández [[id number]]   Estimated Length of Stay: 2 Midnights   Discharge Plan[de-identified] Home with Office Follow-up

## 2021-06-23 NOTE — PROGRESS NOTES
Augusta Marion is a 77 y.o. female presenting for tremors          Chief Complaint   Patient presents with    Follow-up        HPI patient comes in accompanied by her  who states that he thinks she needs to be in the emergency room. Apparently he wanted to take her but she insisted on coming to my office first.  She is a 71-year-old  woman who has a longstanding history of bipolar disease. Her mental health professional is trying to get her off of Depakote so she can start something else but she has recently developed shakes. She has not been able to urinate since yesterday afternoon. It sounds like she could have been having some overflow incontinence because she took a nap in the chair and woke up in she and the chair were saturated. She has not been able to urinate since. She is tremulous and shaking which she thinks is the same thing she did when she became hyponatremic on the verge of seizures in the past.  She does take furosemide for her chronic lower extremity edema. She feels like she is dehydrated. Past Medical History:   Diagnosis Date    [facility]congest[facility] failure) ([facility])     Chronic neck and back pain     Frequent UTI     Hypercholesteremia     Hypertension     Obese         Current Outpatient Medications on File Prior to Visit   Medication Sig Dispense Refill    levothyroxine (SYNTHROID) 200 mcg tablet TAKE 1 TABLET EVERY DAY 90 Tablet 3    gabapentin (NEURONTIN) 300 mg capsule Take 1 Capsule by mouth three (3) times daily. Max Daily Amount: 900 mg. 90 Capsule 3    trihexyphenidyL (ARTANE) 2 mg tablet Take 1 Tablet by mouth daily for 30 days. 30 Tablet 0    amiodarone (CORDARONE) 200 mg tablet TAKE 1 TABLET EVERY DAY 90 Tablet 3    clotrimazole-betamethasone (LOTRISONE) topical cream Bathe and dry affected area and then apply cream twice daily for 2 weeks 15 g 3    FeroSuL 325 mg (65 mg iron) tablet Take 1 Tab by mouth daily.       divalproex DR ([facility]) 500 mg tablet Take 1 Tab by mouth daily for 90 days. 90 Tab 0    ibuprofen (MOTRIN) 600 mg tablet Take 1 Tab by mouth every eight (8) hours as needed for Pain. 90 Tab 3    traZODone (DESYREL) 100 mg tablet Take 1 Tab by mouth nightly as needed for Sleep for up to 90 days. 90 Tab 0    furosemide (LASIX) 40 mg tablet TAKE 2 TABLETS BY MOUTH EVERY DAY 60 Tab 5    busPIRone (BUSPAR) 15 mg tablet Take 1 Tab by mouth three (3) times daily for 90 days. 270 Tab 0    multivitamin (MULTIPLE VITAMIN PO) Take 1 Tab by mouth daily.  Eliquis 2.5 mg tablet TAKE 1 TABLET TWICE DAILY 60 Tab 5    acetaminophen (TYLENOL) 500 mg tablet Take 1 Tab by mouth every six (6) hours as needed for Pain. 60 Tab 1    fexofenadine (Allegra Allergy) 180 mg tablet Take 1 Tab by mouth daily.  omega-3 fatty acids (Fish Oil Concentrate) 1,000 mg cap Take 1 Tab by mouth daily.  magnesium oxide (MAG-OX) 400 mg tablet Take 1 Tab by mouth daily.  b complex-vitamin c-folic acid 0.8 mg (Isabel-Seth) 0.8 mg tab tablet Take 1 Tab by mouth daily.  cranberry extract 425 mg cap Take 425 mg by mouth two (2) times a day.  ibuprofen (MOTRIN) 200 mg tablet Take 400 mg by mouth daily as needed.  baclofen (LIORESAL) 10 mg tablet TAKE 1 TABLET THREE TIMES DAILY 270 Tab 2     No current facility-administered medications on file prior to visit. ROS as noted above    Visit Vitals  Resp 20        Physical Exam obese  woman anxious tearful with intermittent jerking and tremulousness. Normocephalic with pink conjunctiva equal strength in both upper extremities    Assessment & Plan is unclear to me exactly what is going on although she is probably right that she is having about Polar episode. She almost certainly needs to have her electrolytes checked and may need an IV. She is fearful of going in the hospital and is hopeful she will not have to. She probably needs a cath urine.   I have spoken with Annie Cunningham in the emergency department who is a nurse there and given her a synopsis as Dr. Yaneth Downs is not available at the present time and will be coming back later.   I will send Samantha Ac to the emergency room and see where to go from there        Jared Thomas MD

## 2021-06-23 NOTE — ED PROVIDER NOTES
EMERGENCY DEPARTMENT HISTORY AND PHYSICAL EXAM      Date: 6/23/2021  Patient Name: Olivia Pedroza    History of Presenting Illness     Chief Complaint   Patient presents with    Fatigue        History Provided By: Patient and Patient's     HPI: Olivia Pedroza, 77 y.o. female with a past medical history significant diabetes, hypertension, hyperlipidemia, obesity and bipolar disorder presents to the ED with cc of possible dehydration. Patient states she has not urinated since last evening. She was seen by her PCP today and was sent over here for evaluation. The patient is on Lasix. He states she has been drinking fluids. She says she feels weak and tired. She denies any chest pain or shortness of breath she denies fevers or chills. There are no other complaints, changes, or physical findings at this time. PCP: Blade Moeller MD    No current facility-administered medications on file prior to encounter. Current Outpatient Medications on File Prior to Encounter   Medication Sig Dispense Refill    levothyroxine (SYNTHROID) 200 mcg tablet TAKE 1 TABLET EVERY DAY 90 Tablet 3    gabapentin (NEURONTIN) 300 mg capsule Take 1 Capsule by mouth three (3) times daily. Max Daily Amount: 900 mg. 90 Capsule 3    trihexyphenidyL (ARTANE) 2 mg tablet Take 1 Tablet by mouth daily for 30 days. 30 Tablet 0    amiodarone (CORDARONE) 200 mg tablet TAKE 1 TABLET EVERY DAY 90 Tablet 3    clotrimazole-betamethasone (LOTRISONE) topical cream Bathe and dry affected area and then apply cream twice daily for 2 weeks 15 g 3    FeroSuL 325 mg (65 mg iron) tablet Take 1 Tab by mouth daily.  divalproex DR (DEPAKOTE) 500 mg tablet Take 1 Tab by mouth daily for 90 days. 90 Tab 0    ibuprofen (MOTRIN) 600 mg tablet Take 1 Tab by mouth every eight (8) hours as needed for Pain. 90 Tab 3    traZODone (DESYREL) 100 mg tablet Take 1 Tab by mouth nightly as needed for Sleep for up to 90 days.  90 Tab 0    furosemide (LASIX) 40 mg tablet TAKE 2 TABLETS BY MOUTH EVERY DAY 60 Tab 5    busPIRone (BUSPAR) 15 mg tablet Take 1 Tab by mouth three (3) times daily for 90 days. 270 Tab 0    multivitamin (MULTIPLE VITAMIN PO) Take 1 Tab by mouth daily.  Eliquis 2.5 mg tablet TAKE 1 TABLET TWICE DAILY 60 Tab 5    acetaminophen (TYLENOL) 500 mg tablet Take 1 Tab by mouth every six (6) hours as needed for Pain. 60 Tab 1    fexofenadine (Allegra Allergy) 180 mg tablet Take 1 Tab by mouth daily.  omega-3 fatty acids (Fish Oil Concentrate) 1,000 mg cap Take 1 Tab by mouth daily.  magnesium oxide (MAG-OX) 400 mg tablet Take 1 Tab by mouth daily.  b complex-vitamin c-folic acid 0.8 mg (Isabel-Seth) 0.8 mg tab tablet Take 1 Tab by mouth daily.  cranberry extract 425 mg cap Take 425 mg by mouth two (2) times a day.  ibuprofen (MOTRIN) 200 mg tablet Take 400 mg by mouth daily as needed.  baclofen (LIORESAL) 10 mg tablet TAKE 1 TABLET THREE TIMES DAILY 270 Tab 2       Past History     Past Medical History:  Past Medical History:   Diagnosis Date    Bipolar 1 disorder, depressed ([address]     CHF (congestive heart failure) (HCC)     Chronic neck and back pain     Frequent UTI     Hypercholesteremia     Hypertension     Obese     Psychiatric disorder        Past Surgical History:  Past Surgical History:   Procedure Laterality Date    HX ABOVE KNEE AMPUTATION      right    HX CERVICAL FUSION      HX CHOLECYSTECTOMY      HX GASTRIC BYPASS      HX HYSTERECTOMY      HX ORTHOPAEDIC      neck surgery    HX PARTIAL THYROIDECTOMY         Family History:  History reviewed. No pertinent family history. Social History:  Social History     Tobacco Use    Smoking status: Never Smoker    Smokeless tobacco: Never Used   Vaping Use    Vaping Use: Never used   Substance Use Topics    Alcohol use: Not Currently     Comment: rare    Drug use: Never       Allergies:   Allergies   Allergen Reactions    Succinylcholine Chloride Unknown (comments)     Other reaction(s): Other (See Comments)  Chest pain radiating into neck    Codeine Unknown (comments)     Other reaction(s): Other (See Comments)  Abdominal pain unless \"synthetic Codeine\"    Hydromorphone Unknown (comments)     Patient states it makes her not stop talking, jittery      Hydromorphone (Bulk) Other (comments)     Makes crazy      Prednisone Unknown (comments)     Other reaction(s): Other (See Comments)    \"It turns me into a witch,makes me mean\"    Topiramate Other (comments)         Review of Systems     Review of Systems   Constitutional: Negative for fatigue and fever. HENT: Negative for rhinorrhea and sore throat. Respiratory: Negative for cough and shortness of breath. Cardiovascular: Negative for chest pain and palpitations. Gastrointestinal: Positive for nausea. Negative for abdominal pain, diarrhea and vomiting. Genitourinary: Positive for decreased urine volume. Negative for difficulty urinating and dysuria. Musculoskeletal: Negative for arthralgias and myalgias. Skin: Negative for color change and rash. Neurological: Positive for weakness and light-headedness. Negative for headaches. Physical Exam     Physical Exam  Vitals and nursing note reviewed. Constitutional:       General: She is awake. She is not in acute distress. Appearance: Normal appearance. She is well-developed. She is obese. She is not ill-appearing, toxic-appearing or diaphoretic. Interventions: Face mask in place. HENT:      Head: Normocephalic and atraumatic. Eyes:      Conjunctiva/sclera: Conjunctivae normal.      Pupils: Pupils are equal, round, and reactive to light. Cardiovascular:      Rate and Rhythm: Normal rate and regular rhythm. Pulses: Normal pulses. Heart sounds: Normal heart sounds. Pulmonary:      Effort: Pulmonary effort is normal.      Breath sounds: Normal breath sounds.    Abdominal:      General: Abdomen is flat. Palpations: Abdomen is soft. Tenderness: There is no abdominal tenderness. Musculoskeletal:      Cervical back: Normal range of motion and neck supple. Comments: Right BKA. Her left leg has some mild edema. Skin:     General: Skin is warm and dry. Neurological:      General: No focal deficit present. Mental Status: She is alert and oriented to person, place, and time. GCS: GCS eye subscore is 4. GCS verbal subscore is 5. GCS motor subscore is 6. Psychiatric:         Mood and Affect: Mood and affect normal.         Behavior: Behavior normal. Behavior is cooperative. Thought Content: Thought content normal.         Lab and Diagnostic Study Results     Labs -     Recent Results (from the past 12 hour(s))   CBC WITH AUTOMATED DIFF    Collection Time: 06/23/21  1:25 PM   Result Value Ref Range    WBC 9.3 4.6 - 13.2 K/uL    RBC 3.18 (L) 4.20 - 5.30 M/uL    HGB 11.1 (L) 12.0 - 16.0 g/dL    HCT 36.3 35.0 - 45.0 %    .2 (H) 74.0 - 97.0 FL    MCH 34.9 (H) 24.0 - 34.0 PG    MCHC 30.6 (L) 31.0 - 37.0 g/dL    RDW 12.8 11.6 - 14.5 %    PLATELET 697 764 - 343 K/uL    MPV 10.1 9.2 - 11.8 FL    NEUTROPHILS 69 40 - 73 %    LYMPHOCYTES 21 21 - 52 %    MONOCYTES 9 3 - 10 %    EOSINOPHILS 1 0 - 5 %    BASOPHILS 0 0 - 2 %    IMMATURE GRANULOCYTES 0 %    ABS. NEUTROPHILS 6.4 1.8 - 8.0 K/UL    ABS. LYMPHOCYTES 2.0 0.9 - 3.6 K/UL    ABS. MONOCYTES 0.8 0.05 - 1.2 K/UL    ABS. EOSINOPHILS 0.1 0.0 - 0.4 K/UL    ABS. BASOPHILS 0.0 0.0 - 0.1 K/UL    ABS. IMM.  GRANS. 0.0 K/UL    RBC COMMENTS Anisocytosis  1+        RBC COMMENTS Rouleaux  1+       METABOLIC PANEL, COMPREHENSIVE    Collection Time: 06/23/21  1:25 PM   Result Value Ref Range    Sodium 142 135 - 145 mmol/L    Potassium 4.2 3.2 - 5.1 mmol/L    Chloride 105 94 - 111 mmol/L    CO2 23 21 - 33 mmol/L    Anion gap 14 mmol/L    Glucose 88 70 - 110 mg/dL    BUN 75 (H) 9 - 21 mg/dL    Creatinine 3.20 (H) 0.70 - 1.20 mg/dL BUN/Creatinine ratio 23      GFR est AA 17 ml/min/1.73m2    GFR est non-AA 14 ml/min/1.73m2    Calcium 8.6 8.5 - 10.5 mg/dL    Bilirubin, total 0.6 0.2 - 1.0 mg/dL    AST (SGOT) 20 14 - 74 U/L    ALT (SGPT) 18 3 - 52 U/L    Alk. phosphatase 65 38 - 126 U/L    Protein, total 6.5 6.1 - 8.4 g/dL    Albumin 3.4 (L) 3.5 - 4.7 g/dL    Globulin 3.1 g/dL    A-G Ratio 1.1     LIPASE    Collection Time: 06/23/21  1:25 PM   Result Value Ref Range    Lipase 39 10 - 57 U/L   MAGNESIUM    Collection Time: 06/23/21  1:25 PM   Result Value Ref Range    Magnesium 2.3 1.7 - 2.8 mg/dL   URINALYSIS W/ RFLX MICROSCOPIC    Collection Time: 06/23/21  1:25 PM   Result Value Ref Range    Color Claudette      Appearance Turbid (A) Clear      Specific gravity 1.020 1.003 - 1.035      pH (UA) 6.0 5.0 - 9.0      Protein 100 (A) Negative mg/dL    Glucose Negative Negative mg/dL    Ketone Negative Negative mg/dL    Bilirubin Negative Negative      Blood Large (A) Negative      Urobilinogen 0.2 0.2 - 1.0 EU/dL    Nitrites Positive (A) Negative      Leukocyte Esterase Large (A) Negative     URINE MICROSCOPIC    Collection Time: 06/23/21  1:25 PM   Result Value Ref Range    WBC >100 (H) 0 - 4 /hpf    RBC 0-5 0 - 2 /hpf    Bacteria 1+ (A) None /hpf       Radiologic Studies -   @lastxrresult@  CT Results  (Last 48 hours)    None        CXR Results  (Last 48 hours)    None            Medical Decision Making   - I am the first provider for this patient. - I reviewed the vital signs, available nursing notes, past medical history, past surgical history, family history and social history. - Initial assessment performed. The patients presenting problems have been discussed, and they are in agreement with the care plan formulated and outlined with them. I have encouraged them to ask questions as they arise throughout their visit. Vital Signs-Reviewed the patient's vital signs.   Patient Vitals for the past 12 hrs:   Temp Pulse Resp BP SpO2   06/23/21 1304 98.2 °F (36.8 °C) 68 18 117/72 96 %       Records Reviewed: Nursing Notes    The patient presents with decreased urine output with a differential diagnosis of dehydration, electrolyte abnormality, acute renal failure, UTI. ED Course:          Provider Notes (Medical Decision Making):       4606 - Consult Note    I spoke with Dr. Luke Rivera. Speciality: Hospitalist.  The patient history and physical discussed. All pertinent labs and imaging discussed. The consultant recommends admission. Milan Daugherty MD    5163 -patient is a 41-year-old female comes in with concern for possible dehydration. Patient's been on Lasix recently. Her vitals were stable. She was afebrile. White count was normal.  She was in acute renal failure with a BUN of 75 and a creatinine of 3.2. Her baseline creatinine is usually about 1.6. Her urine is infected. She was given IV fluids and and IV antibiotics. She can be admitted. [facility]       Procedures   Medical Decision Makingedical Decision Making  Performed by: Milan Daugherty MD  PROCEDURES:  Procedures       Disposition   Disposition:    Admitted        Diagnosis     Clinical Impression:   1. Acute cystitis without hematuria    2. Acute renal failure, unspecified acute renal failure type ([address]    3. Dehydration        Attestations:    Milan Daugherty MD    Please note that this dictation was completed with [employer], the computer voice recognition software. Quite often unanticipated grammatical, syntax, homophones, and other interpretive errors are inadvertently transcribed by the computer software. Please disregard these errors. Please excuse any errors that have escaped final proofreading. Thank you.

## 2021-06-23 NOTE — PROGRESS NOTES
Notified pt having chest pressure. ivf stopped for time being, ecg reviewed with known LBBB but no new changes, trop sent.

## 2021-06-24 NOTE — PROGRESS NOTES
Discharge instructions to include Medication instructions ; where to  new Rx and what modifications have been made to previous medication. Follow up appointment h as been made and conveyed to pt. Pt states spouse will be picking her up.

## 2021-06-24 NOTE — PROGRESS NOTES
Called Dr. Bernie Prajapati office; spoke to Dutch Brennan to set up one week follow up from hospital admission. Appointment made for soonest available as MD is [facility] next week. Reviewed results, urine sensitivity / Cx remains in pending status. PCP office aware that the pt is being discharged on Keflex and that C&S continues to pend.

## 2021-06-24 NOTE — PROGRESS NOTES
Trigger received for screen, therapist spoke with nursing and they state pt is going home today. Will not request order at this time.   Raymond Dugan, PT, DPT

## 2021-06-24 NOTE — PROGRESS NOTES
Pt bathed and dressed in home cloths. Pt transferred from bed to wheelchair , taken to spouse in [facility]. Pt transferred from wheelchair to POV w/o complication. Pt in stable condition.

## 2021-06-24 NOTE — PROGRESS NOTES
Progress Note    Client Name: Marcel NG Tutewohl  Date: 8/8/2019       Service Type: Couple  Video Visit: No      Session Start Time: 3:00  Session End Time: 3:45      Session Length: 45 min     Session #: 1    Attendees: Client and Spouse / Significant Other     Treatment Plan Last Reviewed: Next session  PHQ-9 / ASUNCION-7 : Each session    DATA  Interactive Complexity: No  Crisis: No        Progress Since Last Session (Related to Symptoms / Goals / Homework):   Symptoms: No change first couples session    Homework: Did not complete       Episode of Care Goals: No improvement - PREPARATION (Decided to change - considering how); Intervened by negotiating a change plan and determining options / strategies for behavior change, identifying triggers, exploring social supports, and working towards setting a date to begin behavior change     Current / Ongoing Stressors and Concerns:  Client and spouse are not communicating well and state they have had trouble increasingly over the past 2 years. 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 He is exhausted at the end of the day and doesn't want to interact with his wife. She is ready for interaction after working from home all day. She reports he is grumpy, angry and difficult to interact with. He also puts his family of origin above his wife and daughter. There are resentments because of this. Client prioritizes helping others over pleasing his wife. Talked about learning how to be a better  as his priorities have not been helpful to his relationship. Client will think about whether he would prefer a male couples therapist to avoid feeling ganged up on. Client says he can handle it. Wife wants greater emotional connection with client. He does not seem willing to make changes to the  Received pt awake and alert. Pt has no c/o pain, discomfort or distress. Pt set up for AM meal. Note; visual of bed bugs noted. Housekeeping made aware. IVF D/C'd at this time per order. business. Says he may not be ok with wife taking a different job. Says he needs her involvement even though it is very hard on their marriage.     Treatment Objective(s) Addressed in This Session:   client to learn how to be more emotionally available to wife     Intervention:   Emotional focused couples work.     ASSESSMENT: Current Emotional / Mental Status (status of significant symptoms):   Risk status (Self / Other harm or suicidal ideation)   Client denies current fears or concerns for personal safety.   Client denies current or recent suicidal ideation or behaviors.   Client denies current or recent homicidal ideation or behaviors.   Client denies current or recent self injurious behavior or ideation.   Client denies other safety concerns.   Client Patient reports there has been no change in risk factors since their last session.     Client Patient reports there has been no change in protective factors since their last session.     Recommended that patient call 911 or go to the local ED should there be a change in any of these risk factors.     Appearance:   Appropriate    Eye Contact:   Good    Psychomotor Behavior: Normal    Attitude:   Cooperative    Orientation:   All   Speech    Rate / Production: Normal     Volume:  Normal    Mood:    Irritable     Affect:    Appropriate     Thought Content:  Clear    Thought Form:  Coherent  Logical    Insight:    Good      Medication Review:   No changes to current psychiatric medication(s)     Medication Compliance:   NA     Changes in Health Issues:   None reported     Chemical Use Review:   Substance Use: Chemical use reviewed, no active concerns identified      Tobacco Use: No current tobacco use.      Diagnosis:  ASUNCION     Collateral Reports Completed:   Not Applicable    PLAN: (Client Tasks / Therapist Tasks / Other)  Homework: Continue to explore therapy goals for couple.        Trinidad Solorio,  LMFT      _______________________________________________________________________

## 2021-06-24 NOTE — DISCHARGE SUMMARY
Discharge Summary       PATIENT ID: Panda Guadalupe  MRN: [id number]   YOB: 1945    DATE OF ADMISSION: 6/23/2021 12:58 PM    DATE OF DISCHARGE: 06/24/21    PRIMARY CARE PROVIDER: Vinod Moeller MD     ATTENDING PHYSICIAN: Destini Avery MD  DISCHARGING PROVIDER: Destini Avery MD        CONSULTATIONS: None    PROCEDURES/SURGERIES: * No surgery found *    ADMITTING [facility] COURSE:   HISTORY OF PRESENT ILLNESS:     [age] y. o. female with a past medical history significant diabetes, hypertension, hyperlipidemia, obesity and bipolar disorder presents to the ED with cc of possible dehydration.  Patient states she has not urinated since last evening.  She was seen by her PCP today and was sent over here for evaluation.  [facility] states she has been drinking fluids.  She says she feels weak and tired. Ken Gibbs denies any chest pain or shortness of breath she denies fevers or chills.  It sounds like she could have been having some overflow incontinence because she took a nap in the chair and woke up in she and the chair were saturated.  She has not been able to urinate since.  She is tremulous and shaking which she thinks is the same thing she did when she became hyponatremic on the verge of seizures in the past.  She does take furosemide for her chronic lower extremity edema.  She feels like she is dehydrated.     Her cr is found to be quite elevated, tells me she has had chf in past when given too much ivf. She confirms dnr/dni, she would not want HD.     We were asked to admit for work up and evaluation of the above problems.          DISCHARGE DIAGNOSES / PLAN:    Assessment / Plan:     [place]NA (acute kidney injury) ([facility]  Admit, aggressive ivf, repeat Cr in am  - adjust meds to renal dosing     UTI (urinary tract infection)  - send urine cult, start dialy ceftriaxone  - due ot hx cdif, ensure to taper off abx as soon as possible     Atrial fibrillation   Cont eliquis and amiodarone     Bipolar I disorder, current episode depressed (HCC)  Cont buspar, depakote, trazodone     Essential (primary) hypertension  Holding lasix due to MONTANA, trend     Phantom limb pain (HCC)  - holding neurontin until renal function improves     Postoperative hypothyroidism  - cont synthroid          Pt has done wonderfully on ivf, with her Cr improving from 3.2 to 1.8, baseline appears 1.5. I have asked that she hold lasix for 3 more days, then resume at half dose, changing from 80mg daily to 40 mg daily for the time being. Of course PCP will cont to titrate dosage to her needs. I will give her keflex for uti, she will see pcp next week to ensure cultures were sensitive. Staff has seen bed bugs, pt notified, I have recommended house inspection by an . FOLLOW UP APPOINTMENTS:    Follow-up Information     Follow up With Specialties Details Why Contact Info    Cris Moeller., MD Internal Medicine, Internal Medicine   [address] [address] [phone]      Cris Moeller MD Internal Medicine, Internal Medicine   [address] [address] [phone]               DIET: cardiac      DISCHARGE MEDICATIONS:  Current Discharge Medication List      START taking these medications    Details   cephALEXin (Keflex) 500 mg capsule Take 1 Capsule by mouth four (4) times daily for 4 days. Qty: 16 Capsule, Refills: 0  Start date: 6/24/2021, End date: 6/28/2021         CONTINUE these medications which have CHANGED    Details   furosemide (LASIX) 40 mg tablet Take 1 Tablet by mouth daily. Indications: Please hold lasix x 3 days, then resume at 40mg daily, which is half of what you were reportedly taking prior to admission.   Qty: 30 Tablet, Refills: 0  Start date: 6/24/2021    Associated Diagnoses: Essential (primary) hypertension         CONTINUE these medications which have NOT CHANGED    Details   levothyroxine (SYNTHROID) 200 mcg tablet TAKE 1 TABLET EVERY DAY  Qty: 90 Tablet, Refills: 3      gabapentin (NEURONTIN) 300 mg capsule Take 1 Capsule by mouth three (3) times daily. Max Daily Amount: 900 mg. Qty: 90 Capsule, Refills: 3    Associated Diagnoses: Neuropathy      trihexyphenidyL (ARTANE) 2 mg tablet Take 1 Tablet by mouth daily for 30 days. Qty: 30 Tablet, Refills: 0    Associated Diagnoses: Tremor      amiodarone (CORDARONE) 200 mg tablet TAKE 1 TABLET EVERY DAY  Qty: 90 Tablet, Refills: 3      clotrimazole-betamethasone (LOTRISONE) topical cream Bathe and dry affected area and then apply cream twice daily for 2 weeks  Qty: 15 g, Refills: 3      FeroSuL 325 mg (65 mg iron) tablet Take 1 Tab by mouth daily. divalproex DR (DEPAKOTE) 500 mg tablet Take 1 Tab by mouth daily for 90 days. Qty: 90 Tab, Refills: 0      !! ibuprofen (MOTRIN) 600 mg tablet Take 1 Tab by mouth every eight (8) hours as needed for Pain. Qty: 90 Tab, Refills: 3      traZODone (DESYREL) 100 mg tablet Take 1 Tab by mouth nightly as needed for Sleep for up to 90 days. Qty: 90 Tab, Refills: 0    Associated Diagnoses: Insomnia, unspecified type      busPIRone (BUSPAR) 15 mg tablet Take 1 Tab by mouth three (3) times daily for 90 days. Qty: 270 Tab, Refills: 0    Comments: Please review prescription with patient. Specifically, that she will take 1 tab three times daily. Associated Diagnoses: Generalized anxiety disorder      multivitamin (MULTIPLE VITAMIN PO) Take 1 Tab by mouth daily. Eliquis 2.5 mg tablet TAKE 1 TABLET TWICE DAILY  Qty: 60 Tab, Refills: 5      acetaminophen (TYLENOL) 500 mg tablet Take 1 Tab by mouth every six (6) hours as needed for Pain. Qty: 60 Tab, Refills: 1      fexofenadine (Allegra Allergy) 180 mg tablet Take 1 Tab by mouth daily. omega-3 fatty acids (Fish Oil Concentrate) 1,000 mg cap Take 1 Tab by mouth daily. magnesium oxide (MAG-OX) 400 mg tablet Take 1 Tab by mouth daily.       b complex-vitamin c-folic acid 0.8 mg (Isabel-Seth) 0.8 mg tab tablet Take 1 Tab by mouth daily. cranberry extract 425 mg cap Take 425 mg by mouth two (2) times a day. !! ibuprofen (MOTRIN) 200 mg tablet Take 400 mg by mouth daily as needed. baclofen (LIORESAL) 10 mg tablet TAKE 1 TABLET THREE TIMES DAILY  Qty: 270 Tab, Refills: 2       !! - Potential duplicate medications found. Please discuss with provider. NOTIFY YOUR PHYSICIAN FOR ANY OF THE FOLLOWING:   Fever over 101 degrees for 24 hours. Chest pain, shortness of breath, fever, chills, nausea, vomiting, diarrhea, change in mentation, falling, weakness, bleeding. Severe pain or pain not relieved by medications. Or, any other signs or symptoms that you may have questions about. PHYSICAL EXAMINATION AT DISCHARGE:  General:          Alert, cooperative, no distress, appears stated age. Skin tenting to hands  HEENT:           Atraumatic, anicteric sclerae, pink conjunctivae                          No oral ulcers, mucosa moist, throat clear, dentition fair  Neck:               Supple, symmetrical,  thyroid: non tender  Lungs:             Clear to auscultation bilaterally. No Wheezing or Rhonchi. No rales. Chest wall:      No tenderness  No Accessory muscle use. Heart:              Regular  rhythm,  No  murmur   + edema to LLE  Abdomen:        Soft, non-tender. Not distended. Bowel sounds normal  Extremities:     r aka  Skin:                Not pale. Not Jaundiced  No rashes   Psych:             Good insight. Not depressed. Not anxious or agitated. Neurologic:      EOMs intact. No facial asymmetry. No aphasia or slurred speech. Symmetrical strength, Sensation grossly intact.  Alert and oriented X 4.       CHRONIC MEDICAL DIAGNOSES:  Problem List as of 6/24/2021 Date Reviewed: 6/3/2021        Codes Class Noted - Resolved    * (Principal) [place] (acute kidney injury) ([facility] [address] ICD-10-CM: N17.9  ICD-9-CM: 584.9  6/23/2021 - Present        UTI (urinary tract infection) ICD-10-CM: N39.0  ICD-9-CM: 599.0  10/12/2020 - Present        Atrial fibrillation with rapid ventricular response ([facility] ICD-10-CM: I48.91  ICD-9-CM: 427.31  5/12/2013 - Present        Bipolar I disorder, current episode depressed ([facility] ICD-10-CM: F31.9  ICD-9-CM: 296.50  3/10/2019 - Present        Essential (primary) hypertension ICD-10-CM: I10  ICD-9-CM: 401.9  8/2/2012 - Present        Phantom limb pain ([facility] ICD-10-CM: G54.6  ICD-9-CM: 353.6  12/30/2018 - Present        Postoperative hypothyroidism ICD-10-CM: E89.0  ICD-9-CM: 244.0  8/2/2012 - Present        Acute renal failure (ARF) ([facility] ICD-10-CM: N17.9  ICD-9-CM: 584.9  6/23/2021 - Present        Elevated troponin ICD-10-CM: R77.8  ICD-9-CM: 790.6  10/12/2020 - Present        CKD (chronic kidney disease) stage 3, GFR 30-59 ml/min ([facility]) ICD-10-CM: N18.30  ICD-9-CM: 585.3  10/8/2019 - Present        Psychological factors affecting medical condition ICD-10-CM: F54  ICD-9-CM: 480  3/10/2019 - Present        Acute non-recurrent maxillary sinusitis ICD-10-CM: J01.00  ICD-9-CM: 461.0  12/30/2018 - Present    Overview Addendum 5/18/2021 11:21 AM by Padmini Mario LPN     Right sided    Formatting of this note might be different from the original.  Right sided             Bipolar disorder, in full remission, most recent episode mixed ([facility] ICD-10-CM: F31.78  ICD-9-CM: 296.66  12/30/2018 - Present        Malaise ICD-10-CM: R53.81  ICD-9-CM: 780.79  12/29/2018 - Present        History of adenomatous polyp of colon ICD-10-CM: Z86.010  ICD-9-CM: V12.72  6/25/2018 - Present        Chronic right shoulder pain ICD-10-CM: M25.511, G89.29  ICD-9-CM: 719.41, 338.29  6/21/2018 - Present        Left arm pain ICD-10-CM: M79.602  ICD-9-CM: 729.5  6/21/2018 - Present        Hemorrhagic cystitis ICD-10-CM: N30.91  ICD-9-CM: 595.9  2/13/2018 - Present        Postural instability ICD-10-CM: R29.3  ICD-9-CM: 729.90  12/15/2017 - Present        AKA stump complication ([facility] [address] LVB-33-LL: T87.9  ICD-9-CM: 997.60  11/7/2017 - Present        Fitting and adjustment of prosthetic device ICD-10-CM: Z44.9  ICD-9-CM: V52.9  10/2/2017 - Present        Protein-calorie malnutrition, moderate (HCC) ICD-10-CM: E44.0  ICD-9-CM: 263.0  10/2/2017 - Present        History of osteomyelitis ICD-10-CM: Z87.39  ICD-9-CM: V13.59  5/24/2017 - Present        Status post above-knee amputation of right lower extremity ([facility] ICD-10-CM: Z89.611  ICD-9-CM: V49.76  5/24/2017 - Present    Overview Addendum 5/18/2021 11:21 AM by Tee Adorno LPN     IMO [id number] UPDATE    Formatting of this note might be different from the original.  IMO 2020 UPDATE             Wheelchair bound ICD-10-CM: Z99.3  ICD-9-CM: V46.3  5/24/2017 - Present        BMI 28.0-28.9,adult ICD-10-CM: U93.40  ICD-9-CM: V85.24  4/14/2017 - Present        Gastroesophageal reflux disease without esophagitis ICD-10-CM: K21.9  ICD-9-CM: 530.81  2/23/2017 - Present        Hyponatremia ICD-10-CM: E87.1  ICD-9-CM: 276.1  2/23/2017 - Present        Septic arthritis ([facility] ICD-10-CM: M00.9  ICD-9-CM: 711.00  2/7/2017 - Present        Adenomatous polyp of colon ICD-10-CM: D12.6  ICD-9-CM: 211.3  7/6/2015 - Present        History of melena ICD-10-CM: Z87.19  ICD-9-CM: V12.79  4/7/2015 - Present        History of gastrointestinal bleeding ICD-10-CM: Z87.19  ICD-9-CM: V12.79  4/2/2015 - Present        GIB (gastrointestinal bleeding) ICD-10-CM: K92.2  ICD-9-CM: 578.9  3/22/2015 - Present        Anemia ICD-10-CM: D64.9  ICD-9-CM: 285.9  3/21/2015 - Present        Anticoagulated on warfarin ICD-10-CM: Z79.01  ICD-9-CM: V58.61  3/21/2015 - Present        Warfarin-induced coagulopathy ([facility] [address] ICD-10-CM: U16.06, T45.515A  ICD-9-CM: 286.7, E934.2  3/21/2015 - Present        Abnormality of gait ICD-10-CM: R26.9  ICD-9-CM: 781.2  3/4/2014 - Present        B12 deficiency ICD-10-CM: E53.8  ICD-9-CM: 266.2  3/4/2014 - Present        Impaired fasting glucose ICD-10-CM: R73.01  ICD-9-CM: 790.21  3/4/2014 - Present        Renal tubular acidosis type II ICD-10-CM: N25.89  ICD-9-CM: 588.89  3/4/2014 - Present        Tachycardia ICD-10-CM: R00.0  ICD-9-CM: 785.0  3/4/2014 - Present        Vitamin D deficiency disease ICD-10-CM: E55.9  ICD-9-CM: 268.9  3/4/2014 - Present        Dysarthria ICD-10-CM: R47.1  ICD-9-CM: 784.51  5/17/2013 - Present        Encephalopathy, unspecified ICD-10-CM: G93.40  ICD-9-CM: 348.30  5/17/2013 - Present        Polypharmacy ICD-10-CM: Z79.899  ICD-9-CM: V58.69  5/17/2013 - Present        Right arm weakness ICD-10-CM: R29.898  ICD-9-CM: 729.89  5/17/2013 - Present        Hypovolemia ICD-10-CM: E86.1  ICD-9-CM: 276.52  5/12/2013 - Present        Pericardial effusion ICD-10-CM: I31.3  ICD-9-CM: 423.9  5/12/2013 - Present        Pleural effusion ICD-10-CM: J90  ICD-9-CM: 511.9  5/12/2013 - Present        Sepsis ([facility] ICD-10-CM: A41.9  ICD-9-CM: 038.9, 995.91  5/12/2013 - Present    Overview Addendum 5/18/2021 11:21 AM by Alex Spear LPN     Update IMO Load 2019    Formatting of this note might be different from the original.  Update IMO Load 2019             Weakness generalized ICD-10-CM: R53.1  ICD-9-CM: 780.79  5/11/2013 - Present        Acute renal failure ([facility] ICD-10-CM: N17.9  ICD-9-CM: 584.9  5/11/2013 - Present        S/P gastric bypass ICD-10-CM: Z98.84  ICD-9-CM: V45.86  8/2/2012 - Present        Bipolar I disorder ([facility] ICD-10-CM: F31.9  ICD-9-CM: 296.7  8/2/2012 - Present    Overview Addendum 5/18/2021 11:21 AM by Alex Spear LPN     IMO 8490 UPDATE    Formatting of this note might be different from the original.  IMO 2020 UPDATE             Fatty liver ICD-10-CM: K76.0  ICD-9-CM: 571.8  8/2/2012 - Present              Greater than 35 minutes were spent with the patient on counseling and coordination of care    Signed:   Chay Ortiz MD  6/24/2021  9:06 AM

## 2021-06-25 NOTE — PROGRESS NOTES
Care Transitions Initial Call    Call within 2 business days of discharge: Yes     Patient: Olivia Pedroza Patient :  MRN: [id number]    Last Discharge [address]       Complaint Diagnosis Description Type Department Provider    21 Fatigue Acute cystitis without hematuria . .. ED to Hosp-Admission (Discharged) (ADMIT) [facility] Evelio Sanchez MD; Renata Ricks... Was this an external facility discharge? No Discharge Facility: [facility]    Challenges to be reviewed by the provider   Additional needs identified to be addressed with provider: no  none         Method of communication with provider : none    Discussed COVID-19 related testing which was not done at this time. Test results were not done. Patient informed of results, if available? yes     Advance Care Planning:   Does patient have an Advance Directive: not on file. Inpatient Readmission Risk score: Unplanned Readmit Risk Score: 15    Was this a readmission? no   Patient stated reason for the admission: unable to urinate x 8 + hours      Patients top risk factors for readmission: lack of knowledge about disease   Interventions to address risk factors: Obtained and reviewed discharge summary and/or continuity of care documents and Assessment and support for treatment adherence and medication management-reviewed new medications to include name, dose, and indication    Care Transition Nurse (CTN) contacted the patient by telephone to perform post hospital discharge assessment. Verified name and  with patient as identifiers. Provided introduction to self, and explanation of the CTN role. CTN reviewed discharge instructions, medical action plan and red flags with patient who verbalized understanding. Were discharge instructions available to patient? yes. Reviewed appropriate site of care based on symptoms and resources available to patient including: PCP and CTN.  Patient given an opportunity to ask questions and does not have any further questions or concerns at this time. The patient agrees to contact the PCP office for questions related to their healthcare. Medication reconciliation was performed with patient, who verbalizes understanding of administration of home medications. Advised obtaining a 90-day supply of all daily and as-needed medications. Referral to Pharm D needed: no     Home Health/Outpatient orders at discharge: none    Durable Medical Equipment ordered at discharge: None    Covid Risk Education    Educated patient about risk for severe COVID-19 due to risk factors according to CDC guidelines. CTN reviewed discharge instructions, medical action plan and red flag symptoms with the patient who verbalized understanding. Discussed COVID vaccination status: yes. Education provided on COVID-19 vaccination as appropriate. Discussed exposure protocols and quarantine with CDC Guidelines. Patient was given an opportunity to verbalize any questions and concerns and agrees to contact CTN or health care provider for questions related to their healthcare. Was patient discharged with a pulse oximeter? no. Discussed and confirmed pulse oximeter discharge instructions and when to notify provider or seek emergency care. Discussed follow-up appointments. If no appointment was previously scheduled, appointment scheduling offered: yes. Is follow up appointment scheduled within 7 days of discharge? yes. [address] follow up appointment(s):   Future Appointments   Date Time Provider Hiral Marte   6/28/2021  1:00 PM JUAN Tipton BS AMB   7/8/2021 11:45 AM Asim Moeller MD [facility] BS AMB     Non-[facility] follow up appointment(s): tbd    Plan for follow-up call in 7-10 days based on severity of symptoms and risk factors. Plan for next call: follow up appointment-ask about PCP appointment  CTN provided contact information for future needs.     Goals Addressed    None
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
## 2021-07-02 NOTE — PROGRESS NOTES
Care Transitions Follow Up Call    Challenges to be reviewed by the provider   Additional needs identified to be addressed with provider: yes  patient continues to experience urinary symptoms. patient says it is very painful to pee. patient can not say if she has a fever or not because she is taking tylenol around the clock. CTN advised patient to go back to the hospital and when patient stated she would never go to [facility] again CTN encouraged patient to go to [facility]. Patient said she would rather die at home than go to any hospital.    Patient said she would go see Dr. Deanna Treadwell Monday. Method of communication with provider : chart routing    Care Transition Nurse (CTN) contacted Kristen Wiggins he patient by telephone to follow up after on 21. Verified name and  with patient as identifiers. Addressed changes since last contact: patient continues with urinary symptoms  Follow up appointment completed? no. Appointment scheduled for 21 Was follow up appointment scheduled within 7 days of discharge? no. PCP on vacation    Advance Care Planning:   Does patient have an Advance Directive: yes, reviewed and current. CTN reviewed discharge instructions, medical action plan and red flags with patient and discussed any barriers to care and/or understanding of plan of care after discharge. Discussed appropriate site of care based on symptoms and resources available to patient including: PCP and return to ED. The patient agrees to contact the PCP office for questions related to their healthcare.      Patients top risk factors for readmission: medical condition-infection   Interventions to address risk factors: Obtained and reviewed discharge summary and/or continuity of care documents    [address] follow up appointment(s):   Future Appointments   Date Time Provider Hiral Marte   2021 11:45 AM Simone Moeller., MD [facility] BS AMB   9/3/2021  3:00 PM Foreign Farley, JUAN [facility] BS AMB     Non-[facility] follow up appointment(s): none    CTN provided contact information for future needs. Plan for follow-up call in 5-7 days based on severity of symptoms and risk factors.   Plan for next call: symptom management-ask about urinary symptoms

## 2021-07-07 PROBLEM — Z87.39 HISTORY OF OSTEOMYELITIS: Status: RESOLVED | Noted: 2017-05-24 | Resolved: 2021-01-01

## 2021-07-07 PROBLEM — T87.9 AKA STUMP COMPLICATION (HCC): Status: RESOLVED | Noted: 2017-11-07 | Resolved: 2021-01-01

## 2021-07-07 PROBLEM — E44.0 PROTEIN-CALORIE MALNUTRITION, MODERATE (HCC): Status: RESOLVED | Noted: 2017-10-02 | Resolved: 2021-01-01

## 2021-07-07 PROBLEM — N17.9 ACUTE RENAL FAILURE (ARF) (HCC): Status: RESOLVED | Noted: 2021-01-01 | Resolved: 2021-01-01

## 2021-07-07 PROBLEM — G89.29 CHRONIC RIGHT SHOULDER PAIN: Status: RESOLVED | Noted: 2018-06-21 | Resolved: 2021-01-01

## 2021-07-07 PROBLEM — E87.1 HYPONATREMIA: Status: RESOLVED | Noted: 2017-02-23 | Resolved: 2021-01-01

## 2021-07-07 PROBLEM — F54 PSYCHOLOGICAL FACTORS AFFECTING MEDICAL CONDITION: Status: RESOLVED | Noted: 2019-03-10 | Resolved: 2021-01-01

## 2021-07-07 PROBLEM — R53.81 MALAISE: Status: RESOLVED | Noted: 2018-12-29 | Resolved: 2021-01-01

## 2021-07-07 PROBLEM — Z89.611 STATUS POST ABOVE-KNEE AMPUTATION OF RIGHT LOWER EXTREMITY (HCC): Status: RESOLVED | Noted: 2017-05-24 | Resolved: 2021-01-01

## 2021-07-07 PROBLEM — N17.9 AKI (ACUTE KIDNEY INJURY) (HCC): Status: RESOLVED | Noted: 2021-01-01 | Resolved: 2021-01-01

## 2021-07-07 PROBLEM — R77.8 ELEVATED TROPONIN: Status: RESOLVED | Noted: 2020-10-12 | Resolved: 2021-01-01

## 2021-07-07 PROBLEM — N30.91 HEMORRHAGIC CYSTITIS: Status: RESOLVED | Noted: 2018-02-13 | Resolved: 2021-01-01

## 2021-07-07 PROBLEM — Z86.010 HISTORY OF ADENOMATOUS POLYP OF COLON: Status: RESOLVED | Noted: 2018-06-25 | Resolved: 2021-01-01

## 2021-07-07 PROBLEM — M00.9 SEPTIC ARTHRITIS (HCC): Status: RESOLVED | Noted: 2017-02-07 | Resolved: 2021-01-01

## 2021-07-07 PROBLEM — N39.0 UTI (URINARY TRACT INFECTION): Status: RESOLVED | Noted: 2020-10-12 | Resolved: 2021-01-01

## 2021-07-07 PROBLEM — R29.3 POSTURAL INSTABILITY: Status: RESOLVED | Noted: 2017-12-15 | Resolved: 2021-01-01

## 2021-07-07 PROBLEM — E66.9 CLASS 2 OBESITY IN ADULT: Status: ACTIVE | Noted: 2021-01-01

## 2021-07-07 PROBLEM — M25.511 CHRONIC RIGHT SHOULDER PAIN: Status: RESOLVED | Noted: 2018-06-21 | Resolved: 2021-01-01

## 2021-07-07 PROBLEM — F31.78 BIPOLAR DISORDER, IN FULL REMISSION, MOST RECENT EPISODE MIXED (HCC): Status: RESOLVED | Noted: 2018-12-30 | Resolved: 2021-01-01

## 2021-07-07 PROBLEM — K21.9 GASTROESOPHAGEAL REFLUX DISEASE WITHOUT ESOPHAGITIS: Status: RESOLVED | Noted: 2017-02-23 | Resolved: 2021-01-01

## 2021-07-07 PROBLEM — D64.9 SEVERE ANEMIA: Status: RESOLVED | Noted: 2021-01-01 | Resolved: 2021-01-01

## 2021-07-07 PROBLEM — M79.602 LEFT ARM PAIN: Status: RESOLVED | Noted: 2018-06-21 | Resolved: 2021-01-01

## 2021-07-07 PROBLEM — D64.9 SEVERE ANEMIA: Status: ACTIVE | Noted: 2021-01-01

## 2021-07-07 PROBLEM — Z44.9: Status: RESOLVED | Noted: 2017-10-02 | Resolved: 2021-01-01

## 2021-07-07 PROBLEM — S42.192A: Status: ACTIVE | Noted: 2021-01-01

## 2021-07-07 PROBLEM — J01.00 ACUTE NON-RECURRENT MAXILLARY SINUSITIS: Status: RESOLVED | Noted: 2018-12-30 | Resolved: 2021-01-01

## 2021-07-07 NOTE — ED PROVIDER NOTES
EMERGENCY DEPARTMENT HISTORY AND PHYSICAL EXAM      Date: 7/7/2021  Patient Name: Kirk Carbajal    History of Presenting Illness     Chief Complaint   Patient presents with    Fall       History Provided By: Patient and EMS    HPI: Kirk Carbajal, 77 y.o. female with a past medical history significant Hypertension, CHF, bipolar disorder, chronic neck and back pain, obesity presents to the ED, brought in by EMS, with cc of fall. Patient states she was sitting in her wheelchair in which reached out for something when she fell out of the chair. She denies loss of consciousness, she complains of left shoulder pain and also swelling on her chin. She has no chest or abdomen pain. She states she has a cyst on her right hip that she supposed to have surgery for. There are no other complaints, changes, or physical findings at this time. PCP: Nnamdi Moeller MD    No current facility-administered medications on file prior to encounter. Current Outpatient Medications on File Prior to Encounter   Medication Sig Dispense Refill    traZODone (DESYREL) 150 mg tablet Take 1 Tablet by mouth nightly as needed for Sleep for up to 90 days. 90 Tablet 0    busPIRone (BUSPAR) 15 mg tablet Take 1 Tablet by mouth three (3) times daily for 90 days. 270 Tablet 0    furosemide (LASIX) 40 mg tablet Take 1 Tablet by mouth daily. Indications: Please hold lasix x 3 days, then resume at 40mg daily, which is half of what you were reportedly taking prior to admission. 30 Tablet 0    levothyroxine (SYNTHROID) 200 mcg tablet TAKE 1 TABLET EVERY DAY 90 Tablet 3    gabapentin (NEURONTIN) 300 mg capsule Take 1 Capsule by mouth three (3) times daily. Max Daily Amount: 900 mg. 90 Capsule 3    trihexyphenidyL (ARTANE) 2 mg tablet Take 1 Tablet by mouth daily for 30 days.  30 Tablet 0    amiodarone (CORDARONE) 200 mg tablet TAKE 1 TABLET EVERY DAY 90 Tablet 3    clotrimazole-betamethasone (LOTRISONE) topical cream Bathe and dry affected area and then apply cream twice daily for 2 weeks 15 g 3    FeroSuL 325 mg (65 mg iron) tablet Take 1 Tab by mouth daily.  divalproex DR (DEPAKOTE) 500 mg tablet Take 1 Tab by mouth daily for 90 days. 90 Tab 0    ibuprofen (MOTRIN) 600 mg tablet Take 1 Tab by mouth every eight (8) hours as needed for Pain. 90 Tab 3    multivitamin (MULTIPLE VITAMIN PO) Take 1 Tab by mouth daily.  Eliquis 2.5 mg tablet TAKE 1 TABLET TWICE DAILY 60 Tab 5    acetaminophen (TYLENOL) 500 mg tablet Take 1 Tab by mouth every six (6) hours as needed for Pain. 60 Tab 1    fexofenadine (Allegra Allergy) 180 mg tablet Take 1 Tab by mouth daily.  omega-3 fatty acids (Fish Oil Concentrate) 1,000 mg cap Take 1 Tab by mouth daily.  magnesium oxide (MAG-OX) 400 mg tablet Take 1 Tab by mouth daily.  b complex-vitamin c-folic acid 0.8 mg (Isabel-Seth) 0.8 mg tab tablet Take 1 Tab by mouth daily.  cranberry extract 425 mg cap Take 425 mg by mouth two (2) times a day.  baclofen (LIORESAL) 10 mg tablet TAKE 1 TABLET THREE TIMES DAILY 270 Tab 2       Past History     Past Medical History:  Past Medical History:   Diagnosis Date    Bipolar 1 disorder, depressed ([address]     CHF (congestive heart failure) (HCC)     Chronic neck and back pain     Frequent UTI     Hypercholesteremia     Hypertension     Obese     Psychiatric disorder        Past Surgical History:  Past Surgical History:   Procedure Laterality Date    HX ABOVE KNEE AMPUTATION      right    HX CERVICAL FUSION      HX CHOLECYSTECTOMY      HX GASTRIC BYPASS      HX HYSTERECTOMY      HX ORTHOPAEDIC      neck surgery    HX PARTIAL THYROIDECTOMY         Family History:  History reviewed. No pertinent family history.     Social History:  Social History     Tobacco Use    Smoking status: Never Smoker    Smokeless tobacco: Never Used   Vaping Use    Vaping Use: Never used   Substance Use Topics    Alcohol use: Not Currently Comment: rare    Drug use: Never       Allergies: Allergies   Allergen Reactions    Succinylcholine Chloride Unknown (comments)     Other reaction(s): Other (See Comments)  Chest pain radiating into neck    Codeine Unknown (comments)     Other reaction(s): Other (See Comments)  Abdominal pain unless \"synthetic Codeine\"    Hydromorphone Unknown (comments)     Patient states it makes her not stop talking, jittery      Hydromorphone (Bulk) Other (comments)     Makes crazy      Prednisone Unknown (comments)     Other reaction(s): Other (See Comments)    \"It turns me into a witch,makes me mean\"    Topiramate Other (comments)         Review of Systems     Review of Systems   All other systems reviewed and are negative. Physical Exam     Physical Exam  Vitals and nursing note reviewed. Constitutional:       General: She is not in acute distress. Appearance: She is well-developed. She is not diaphoretic. Comments: Morbidly obese female arrived via EMS on a long spine board. HENT:      Head: Normocephalic and atraumatic. Jaw: No trismus. Comments: She has some swelling and bruising of face with a small bruise left infraorbital area towards been nose bridge. She is tender to palpation of this area. She also has a small bruise over left chin with some tenderness to palpation of the same area. Dentition seems intact. Right Ear: External ear normal. No swelling or tenderness. Tympanic membrane is not perforated, erythematous or bulging. Left Ear: External ear normal. No swelling or tenderness. Tympanic membrane is not perforated, erythematous or bulging. Nose: Nose normal. No mucosal edema or rhinorrhea. Right Sinus: No maxillary sinus tenderness or frontal sinus tenderness. Left Sinus: No maxillary sinus tenderness or frontal sinus tenderness. Mouth/Throat:      Mouth: No oral lesions. Dentition: No dental abscesses. Pharynx: Uvula midline.  No oropharyngeal exudate, posterior oropharyngeal erythema or uvula swelling. Tonsils: No tonsillar abscesses. Eyes:      General: No scleral icterus. Right eye: No discharge. Left eye: No discharge. Conjunctiva/sclera: Conjunctivae normal.   Cardiovascular:      Rate and Rhythm: Normal rate and regular rhythm. Heart sounds: Normal heart sounds. No murmur heard. No friction rub. No gallop. Pulmonary:      Effort: Pulmonary effort is normal. No tachypnea, accessory muscle usage or respiratory distress. Breath sounds: Normal breath sounds. No decreased breath sounds, wheezing, rhonchi or rales. Abdominal:      Palpations: Abdomen is soft. Comments: Abdomen is obese soft and nontender. Musculoskeletal:         General: No tenderness. Normal range of motion. Cervical back: Normal range of motion and neck supple. Comments: She is s/p right AKA. The stump appears intact. The left leg has 1+ edema and small bruise anterior lower leg. Lymphadenopathy:      Cervical: No cervical adenopathy. Skin:     General: Skin is warm and dry. Findings: No erythema or rash. Neurological:      Mental Status: She is alert and oriented to person, place, and time.    Psychiatric:         Judgment: Judgment normal.         Lab and Diagnostic Study Results     Labs -     Recent Results (from the past 12 hour(s))   CBC W/O DIFF    Collection Time: 07/07/21  5:57 PM   Result Value Ref Range    WBC 6.2 4.6 - 13.2 K/uL    RBC 3.13 (L) 4.20 - 5.30 M/uL    HGB 10.8 (L) 12.0 - 16.0 g/dL    HCT 35.1 35.0 - 45.0 %    .1 (H) 74.0 - 97.0 FL    MCH 34.5 (H) 24.0 - 34.0 PG    MCHC 30.8 (L) 31.0 - 37.0 g/dL    RDW 12.4 11.6 - 14.5 %    PLATELET 886 385 - 547 K/uL    MPV 9.1 (L) 9.2 - 53.6 FL   METABOLIC PANEL, BASIC    Collection Time: 07/07/21  5:57 PM   Result Value Ref Range    Sodium 134 (L) 135 - 145 mmol/L    Potassium 4.0 3.2 - 5.1 mmol/L    Chloride 98 94 - 111 mmol/L CO2 25 21 - 33 mmol/L    Anion gap 11 mmol/L    Glucose 101 70 - 110 mg/dL    BUN 47 (H) 9 - 21 mg/dL    Creatinine 1.80 (H) 0.70 - 1.20 mg/dL    BUN/Creatinine ratio 26      GFR est AA 33 ml/min/1.73m2    GFR est non-AA 28 ml/min/1.73m2    Calcium 8.5 8.5 - 10.5 mg/dL   MAGNESIUM    Collection Time: 07/07/21  5:57 PM   Result Value Ref Range    Magnesium 2.3 1.7 - 2.8 mg/dL   URINALYSIS W/ REFLEX CULTURE    Collection Time: 07/07/21  6:30 PM    Specimen: Urine   Result Value Ref Range    Color Yellow      Appearance Clear      Specific gravity 1.016 1.005 - 1.030      pH (UA) 5.5 5.0 - 8.0      Protein 30 (A) Negative mg/dL    Glucose Negative Negative mg/dL    Ketone Negative Negative mg/dL    Bilirubin Negative Negative      Blood Negative Negative      Urobilinogen 1.0 0.2 - 1.0 EU/dL    Nitrites Negative Negative      Leukocyte Esterase Negative Negative      WBC PENDING /hpf    RBC PENDING /hpf    Epithelial cells PENDING /lpf    Bacteria PENDING /hpf    UA:UC IF INDICATED PENDING        Radiologic Studies -   @lastxrresult@  CT Results  (Last 48 hours)               07/07/21 1628  CT HEAD WO CONT Final result    Impression:          1. No evidence of acute intracranial abnormality. 2. Mild periventricular white matter low-attenuation as can be seen with chronic   ischemic microvascular change. 3. Mild soft tissue swelling about the periorbital and facial regions. Narrative:  EXAM: CT head       INDICATION: Fall, multiple facial bruises. COMPARISON: None. TECHNIQUE: Axial CT imaging of the head was performed without intravenous   contrast. Standard multiplanar coronal and sagittal reformatted images were   obtained and are included in interpretation. One or more dose reduction techniques were used on this CT: automated exposure   control, adjustment of the mAs and/or kVp according to patient size, and   iterative reconstruction techniques.   The specific techniques used on this CT exam have been documented in the patient's electronic medical record. Digital   Imaging and Communications in Medicine (DICOM) format image data are available   to nonaffiliated external healthcare facilities or entities on a secure, media   free, reciprocally searchable basis with patient authorization for at least a   12-month period after this study. _______________       FINDINGS:       BRAIN AND POSTERIOR FOSSA: The sulci, folia, ventricles and basal cisterns are   within normal limits for the patient?s age. There is no intracranial hemorrhage,   mass effect, or midline shift. Gray-white matter differentiation is within   normal limits. Mild periventricular white matter low-attenuation is present. EXTRA-AXIAL SPACES AND MENINGES: There are no abnormal extra-axial fluid   collections. CALVARIUM: Intact. SINUSES: Mild mucosal thickening of both anterior posterior ethmoid air cells. Evidence of prior bilateral medial antrostomy. OTHER: Mild periorbital and facial soft tissue swelling.       _______________           07/07/21 1628  CT SPINE CERV WO CONT Final result    Impression:          1. Postop changes as described above without evidence of fracture or acute   traumatic listhesis involving the cervical spine. 2. Dilated main pulmonary artery as can be seen with pulmonary hypertension.      > Mosaic attenuation of the included pulmonary parenchyma can be seen in the   context of both small airways or small vessels disease. Narrative:  EXAM: CT Cervical spine       INDICATION: Cervical neck pain after fall, trauma       COMPARISON: No prior study. TECHNIQUE: Axial CT imaging of the cervical spine was performed from the skull   base to the upper thoracic spine without intravenous contrast. Multiplanar   reformats were generated.        One or more dose reduction techniques were used on this CT: automated exposure   control, adjustment of the mAs and/or kVp according to patient size, and   iterative reconstruction techniques. The specific techniques used on this CT   exam have been documented in the patient's electronic medical record. Digital   Imaging and Communications in Medicine (DICOM) format image data are available   to nonaffiliated external healthcare facilities or entities on a secure, media   free, reciprocally searchable basis with patient authorization for at least a   12-month period after this study. _______________       FINDINGS:       VERTEBRAE AND DISCS: Coronal reformatted images demonstrate an intact and normal   appearance to the occipital condyles. Atlantodental and atlantooccipital   articulations both appear within normal limits. There is straightening of usual   cervical lordosis without evidence of listhesis. Prior posterior anterior   cervical discectomy and instrumented fusion is present spanning the C4-C6   segments. Osseous union appears solid. Prior left-sided laminectomies across the   C4-C6 levels. Vertebral body heights are preserved. No evidence of displaced   fracture. SPINAL CANAL AND FORAMINA: No high grade spinal canal or foramina stenosis is   seen. PREVERTEBRAL SOFT TISSUES: No abnormal prevertebral soft tissue swelling within   the limitations of artifact incurred from the indwelling fixation hardware. VISIBLE INTRACRANIAL CONTENTS: Unremarkable. LUNG APICES: Mosaic attenuation of the pulmonary parenchyma present. Dilated   main pulmonary artery, estimated at approximately 4.0 cm in size. OTHER: None.       _______________           07/07/21 1628  CT MAXILLOFACIAL WO CONT Final result    Impression:          1. Mild soft tissue swelling without evidence of acute maxillofacial fracture. 2. Mild and nonspecific paranasal mucosal thickening involving the ethmoid air   cells and sphenoid sinus.         Narrative:  Maxillofacial CT       History: Facial bruising, pain, fall, trauma Technique: Multiple axial CT images of the maxillofacial structures including   the mandible were performed. Additional coronal and sagittal reformations were   also performed. One or more dose reduction techniques were used on this CT:   automated exposure control, adjustment of the mAs and/or kVp according to   patient size, and iterative reconstruction techniques. The specific techniques   used on this CT exam have been documented in the patient's electronic medical   record. Digital Imaging and Communications in Medicine (DICOM) format image   data are available to nonaffiliated external healthcare facilities or entities   on a secure, media free, reciprocally searchable basis with patient   authorization for at least a 12-month period after this study. Findings: The frontal bone, bilateral lateral orbital walls, zygomatic arches, and medial   orbital walls are intact. Maxillary sinuses intact. Pterygoid plates and   anterior maxillary spine appear within normal limits. Nasal bones intact. Temporomandibular joint alignment appears within normal limits. Mandible appears   intact. There is mild mucosal thickening of the anterior posterior ethmoid air cells. Evidence of prior medial antrostomy. Opacification of the sphenoid sinus. Remaining imaged paranasal sinuses and mastoid air cells appear within normal   limits. CT appearance of the orbits is within normal limits. Mild periorbital soft   tissue swelling. CXR Results  (Last 48 hours)    None            Medical Decision Making   - I am the first provider for this patient. - I reviewed the vital signs, available nursing notes, past medical history, past surgical history, family history and social history. - Initial assessment performed. The patients presenting problems have been discussed, and they are in agreement with the care plan formulated and outlined with them.   I have encouraged them to ask questions as they arise throughout their visit. Vital Signs-Reviewed the patient's vital signs. Patient Vitals for the past 12 hrs:   Temp Pulse Resp BP SpO2   07/07/21 1519 98 °F (36.7 °C) (!) 58 22 119/60 98 %       Records Reviewed: Nursing Notes and Old Medical Records      ED Course:      Patient states she is left-handed and the left hand is 1 with the left humerus neck impaction fracture. She is unable to use her right hand due to rotator cuff injury. She is s/p right AKA. She states that she really is unable to care for herself. Daughter who is at bedside also indicated patient is too big for her to lift and care for. I discussed patient with hospitalist and will admit patient for pain control as well as consult with case management. Provider Notes (Medical Decision Making):         Procedures   Medical Decision Makingedical Decision Making  Performed by: Luane Lesches, MD  PROCEDURES:  Procedures   Left shoulder immobilizer applied by RN. Disposition   Disposition: Condition stable  Admitted to medical floor the case was discussed with the admitting hospitalist, Tom Turcios, attending Adam    Diagnosis:   1. Severe anemia                      Diagnosis     Clinical Impression:   1. Severe anemia        Attestations:    Luane Lesches, MD    Please note that this dictation was completed with [employer], the computer voice recognition software. Quite often unanticipated grammatical, syntax, homophones, and other interpretive errors are inadvertently transcribed by the computer software. Please disregard these errors. Please excuse any errors that have escaped final proofreading. Thank you.

## 2021-07-07 NOTE — ED TRIAGE NOTES
Pt BIB medic  States she was sitting in her wheelchair and reached for something and fell out of her chair.   Pt states she does remember hitting her chin

## 2021-07-07 NOTE — ASSESSMENT & PLAN NOTE
-Patient status post fall out of wheelchair  -Right shoulder x-ray impacted, nondisplaced/nonangulated proximal left humerus fracture with dominant  involvement of the surgical neck.   -Pain management  -Orthopedics consulted  -Case management consult for home health versus SNF  -PT consult

## 2021-07-07 NOTE — ASSESSMENT & PLAN NOTE
-BUN 47/creatinine 1.80, stable when compared compared to 2 weeks ago  -Renal dose medications  -Continue to monitor BMP (BUN and creatinine) and electrolytes closely

## 2021-07-07 NOTE — H&P
History and Physical    Subjective:     Katarina Griffin is a 77 y.o. elderly  female with a past medical history of hypertension, atrial fib, hypothyroidism, bipolar, congestive heart failure, and obesity, patient presents to the ED with a chief complaint of left arm pain status post a fall from her wheelchair. Patient states she was at home in her wheelchair trying to get something from out of the cabinet, patient lost balance while in the wheelchair and fell to her left side injuring her left shoulder and hitting her face on the floor. At this time patient denies any dizziness, headaches, shortness of breath, and chest pain. In the ED H&H 10.8 and 35.1, UA ordered, BUN 47 creatinine 1.80, sodium 134, CT of the head no evidence of acute intracranial abnormality did show mild soft tissue swelling about the periorbital and facial regions, CT of the maxillofacial shown mild soft tissue swelling without evidence of acute maxillofacial fracture, CT of the spine showed postop changes as described without evidence of fracture or acute traumatic listhesis involving the cervical spine, it did show dilated main pulmonary artery as it can be seen in pulmonary hypertension, left shoulder x-ray shown impacted, nondisplaced/nonangulated proximal left humerus fracture with dominant involvement of the surgical neck. In the ED patient received 100 mcg of fentanyl via IV and 4 mg of IV Zofran. Admit to medical surgery unit  Patient assessed in the ED, at the bedside, patient is alert and oriented, there is no acute distress noted. Patient agrees to admission for a diagnosis of left humerus fracture, treatment to include pain management and orthopedic surgical consult.     Past Medical History:   Diagnosis Date    Bipolar 1 disorder, depressed ([facility] [address]     CHF (congestive heart failure) (HCC)     Chronic neck and back pain     Frequent UTI     Hypercholesteremia     Hypertension     Obese     Psychiatric disorder       Past Surgical History:   Procedure Laterality Date    HX ABOVE KNEE AMPUTATION      right    HX CERVICAL FUSION      HX CHOLECYSTECTOMY      HX GASTRIC BYPASS      HX HYSTERECTOMY      HX ORTHOPAEDIC      neck surgery    HX PARTIAL THYROIDECTOMY       History reviewed. No pertinent family history. Social History     Tobacco Use    Smoking status: Never Smoker    Smokeless tobacco: Never Used   Substance Use Topics    Alcohol use: Not Currently     Comment: rare       Prior to Admission medications    Medication Sig Start Date End Date Taking? Authorizing Provider   traZODone (DESYREL) 150 mg tablet Take 1 Tablet by mouth nightly as needed for Sleep for up to 90 days. 6/28/21 9/26/21 Yes Dexter Farley NP   busPIRone (BUSPAR) 15 mg tablet Take 1 Tablet by mouth three (3) times daily for 90 days. 6/28/21 9/26/21 Yes Dexter Farley NP   furosemide (LASIX) 40 mg tablet Take 1 Tablet by mouth daily. Indications: Please hold lasix x 3 days, then resume at 40mg daily, which is half of what you were reportedly taking prior to admission. 6/24/21  Yes Yaneli Oshea MD   levothyroxine (SYNTHROID) 200 mcg tablet TAKE 1 TABLET EVERY DAY 6/22/21  Yes Emely Moeller MD   gabapentin (NEURONTIN) 300 mg capsule Take 1 Capsule by mouth three (3) times daily. Max Daily Amount: 900 mg. 6/22/21  Yes Emely Moeller MD   trihexyphenidyL (ARTANE) 2 mg tablet Take 1 Tablet by mouth daily for 30 days. 6/22/21 7/22/21 Yes Dexter Farley NP   amiodarone (CORDARONE) 200 mg tablet TAKE 1 TABLET EVERY DAY 6/17/21  Yes Emely Moeller MD   clotrimazole-betamethasone (LOTRISONE) topical cream Bathe and dry affected area and then apply cream twice daily for 2 weeks 6/3/21  Yes Emely Moeller MD   FeroSuL 325 mg (65 mg iron) tablet Take 1 Tab by mouth daily. 2/24/21  Yes Provider, Historical   divalproex DR (DEPAKOTE) 500 mg tablet Take 1 Tab by mouth daily for 90 days.  5/18/21 8/16/21 Yes Zo Moeller MD   ibuprofen (MOTRIN) 600 mg tablet Take 1 Tab by mouth every eight (8) hours as needed for Pain. 5/18/21  Yes Zo Moeller MD   multivitamin (MULTIPLE VITAMIN PO) Take 1 Tab by mouth daily. Yes Provider, Karrie   Eliquis 2.5 mg tablet TAKE 1 TABLET TWICE DAILY 12/16/20  Yes Zo Moeller MD   acetaminophen (TYLENOL) 500 mg tablet Take 1 Tab by mouth every six (6) hours as needed for Pain. 10/15/20  Yes Yvonne Mathis NP   fexofenadine (Allegra Allergy) 180 mg tablet Take 1 Tab by mouth daily. Yes Yeny, MD Mariama   omega-3 fatty acids (Fish Oil Concentrate) 1,000 mg cap Take 1 Tab by mouth daily. Yes Yney, MD Mariama   magnesium oxide (MAG-OX) 400 mg tablet Take 1 Tab by mouth daily. Yes Yeny, MD Mariama   b complex-vitamin c-folic acid 0.8 mg (Isabel-Seth) 0.8 mg tab tablet Take 1 Tab by mouth daily. Yes Other, MD Mariama   cranberry extract 425 mg cap Take 425 mg by mouth two (2) times a day. Yes Yeny, MD Mariama   baclofen (LIORESAL) 10 mg tablet TAKE 1 TABLET THREE TIMES DAILY 9/23/20  Yes Zo Moeller MD     Allergies   Allergen Reactions    Succinylcholine Chloride Unknown (comments)     Other reaction(s): Other (See Comments)  Chest pain radiating into neck    Codeine Unknown (comments)     Other reaction(s): Other (See Comments)  Abdominal pain unless \"synthetic Codeine\"    Hydromorphone Unknown (comments)     Patient states it makes her not stop talking, jittery      Hydromorphone (Bulk) Other (comments)     Makes crazy      Prednisone Unknown (comments)     Other reaction(s):  Other (See Comments)    \"It turns me into a witch,makes me mean\"    Topiramate Other (comments)          REVIEW OF SYSTEMS:       Total of 12 systems reviewed as follows:    Positive = Red  Constitutional: Negative for malaise/fatigue and weakness, negative for fever and chills   HENT: Negative for ear pain, headaches, negative for loss of sense of taste and smell   Eyes: Negative for blurred vision and double vision   Skin: Negative for itching, negative for open areas   Cardiovascular: Negative for chest pain, palpitations, negative for swelling   Respiratory: Negative for shortness or breath, negative for cough, negative for sputum production   Gastrointestinal: Negative for abdominal pain, constipation, nausea, vomiting, and diarrhea   Genitourinary: Negative for dysuria, frequency, and hematuria   Musculoskeletal: Positive for left shoulder pain   Neurological: Negative for dizziness, seizures, and headaches   Psychiatric: Negative for depression and anxiousness        Objective:   VITALS:    Visit Vitals  /60 (BP 1 Location: Right lower arm, BP Patient Position: At rest)   Pulse (!) 58   Temp 98 °F (36.7 °C)   Resp 22   SpO2 98%       PHYSICAL EXAM:  Positive = Red  Constitutional: Alert and oriented x 3 and no noted acute distress appears to be stated age. HENT: Atraumatic, nose midline, oropharynx clear ad moist, trachea midline, no supraclavicular, ecchymosis to chin, scattered bruising, erythema to left lower extremity with bruise to the lateral left leg   Eyes: Conjunctiva normal and pupils equal, periorbital edema and ecchymosis,   Skin: Dry, intact, warm, and dry   Cardiovascular: Sinus bradycardia and rhythm, normal heart sounds, no murmurs, pulses palpable, no noted edema   Respiratory: Lungs clear throughout, no wheezes, rales, or rhonchi, effort normal   Gastrointestinal: Obese abdomen, bowel sounds are normal, bowl soft and non-tender   Genitourinary: Deferred   Musculoskeletal: Decreased range of motion, patient is wheelchair-bound with right AKA   Neurological: Alert and oriented x 3, awake. No facial droop. No slurred speech.  Hand grasps equal.    Psychiatric: Affect normal, Answers questions appropriately     __________________________________________________  Care Plan discussed with:    Comments   Patient X    Family      RN     Care Manager                    Consultant:      _______________________________________________________________________  Expected  Disposition:   Home with Family X   HH/PT/OT/RN    SNF/LTC    TIFFANIE    ________________________________________________________________________    Labs:  Recent Results (from the past 24 hour(s))   CBC W/O DIFF    Collection Time: 07/07/21  5:57 PM   Result Value Ref Range    WBC 6.2 4.6 - 13.2 K/uL    RBC 3.13 (L) 4.20 - 5.30 M/uL    HGB 10.8 (L) 12.0 - 16.0 g/dL    HCT 35.1 35.0 - 45.0 %    .1 (H) 74.0 - 97.0 FL    MCH 34.5 (H) 24.0 - 34.0 PG    MCHC 30.8 (L) 31.0 - 37.0 g/dL    RDW 12.4 11.6 - 14.5 %    PLATELET 250 877 - 135 K/uL    MPV 9.1 (L) 9.2 - 64.4 FL   METABOLIC PANEL, BASIC    Collection Time: 07/07/21  5:57 PM   Result Value Ref Range    Sodium 134 (L) 135 - 145 mmol/L    Potassium 4.0 3.2 - 5.1 mmol/L    Chloride 98 94 - 111 mmol/L    CO2 25 21 - 33 mmol/L    Anion gap 11 mmol/L    Glucose 101 70 - 110 mg/dL    BUN 47 (H) 9 - 21 mg/dL    Creatinine 1.80 (H) 0.70 - 1.20 mg/dL    BUN/Creatinine ratio 26      GFR est AA 33 ml/min/1.73m2    GFR est non-AA 28 ml/min/1.73m2    Calcium 8.5 8.5 - 10.5 mg/dL   MAGNESIUM    Collection Time: 07/07/21  5:57 PM   Result Value Ref Range    Magnesium 2.3 1.7 - 2.8 mg/dL       Imaging:  XR SHOULDER LT AP/LAT MIN 2 V    Result Date: 7/7/2021  EXAM: XR SHOULDER LT AP/LAT MIN 2 V CLINICAL INDICATION/HISTORY: Pain -Additional: Left shoulder pain after fall COMPARISON: None TECHNIQUE: 3 views of the left shoulder _______________ FINDINGS: BONES: Minimally displaced and mildly impacted fracture of the proximal humerus, involving the surgical neck and to lesser extent greater tuberosity. No significant angulation demonstrated. Mild degenerative changes across the acromioclavicular joint.  SOFT TISSUES: Included left lung and left chest wall appear clear. _______________     Impacted, nondisplaced/nonangulated proximal left humerus fracture with dominant involvement of the surgical neck. CT HEAD WO CONT    Result Date: 7/7/2021  EXAM: CT head INDICATION: Fall, multiple facial bruises. COMPARISON: None. TECHNIQUE: Axial CT imaging of the head was performed without intravenous contrast. Standard multiplanar coronal and sagittal reformatted images were obtained and are included in interpretation. One or more dose reduction techniques were used on this CT: automated exposure control, adjustment of the mAs and/or kVp according to patient size, and iterative reconstruction techniques. The specific techniques used on this CT exam have been documented in the patient's electronic medical record. Digital Imaging and Communications in Medicine (DICOM) format image data are available to nonaffiliated external healthcare facilities or entities on a secure, media free, reciprocally searchable basis with patient authorization for at least a 12-month period after this study. _______________ FINDINGS: BRAIN AND POSTERIOR FOSSA: The sulci, folia, ventricles and basal cisterns are within normal limits for the patient?s age. There is no intracranial hemorrhage, mass effect, or midline shift. Gray-white matter differentiation is within normal limits. Mild periventricular white matter low-attenuation is present. EXTRA-AXIAL SPACES AND MENINGES: There are no abnormal extra-axial fluid collections. CALVARIUM: Intact. SINUSES: Mild mucosal thickening of both anterior posterior ethmoid air cells. Evidence of prior bilateral medial antrostomy. OTHER: Mild periorbital and facial soft tissue swelling. _______________     1. No evidence of acute intracranial abnormality. 2. Mild periventricular white matter low-attenuation as can be seen with chronic ischemic microvascular change. 3. Mild soft tissue swelling about the periorbital and facial regions.     CT MAXILLOFACIAL WO CONT    Result Date: 7/7/2021  Maxillofacial CT History: Facial bruising, pain, fall, trauma Technique: Multiple axial CT images of the maxillofacial structures including the mandible were performed. Additional coronal and sagittal reformations were also performed. One or more dose reduction techniques were used on this CT: automated exposure control, adjustment of the mAs and/or kVp according to patient size, and iterative reconstruction techniques. The specific techniques used on this CT exam have been documented in the patient's electronic medical record. Digital Imaging and Communications in Medicine (DICOM) format image data are available to nonaffiliated external healthcare facilities or entities on a secure, media free, reciprocally searchable basis with patient authorization for at least a 12-month period after this study. Findings: The frontal bone, bilateral lateral orbital walls, zygomatic arches, and medial orbital walls are intact. Maxillary sinuses intact. Pterygoid plates and anterior maxillary spine appear within normal limits. Nasal bones intact. Temporomandibular joint alignment appears within normal limits. Mandible appears intact. There is mild mucosal thickening of the anterior posterior ethmoid air cells. Evidence of prior medial antrostomy. Opacification of the sphenoid sinus. Remaining imaged paranasal sinuses and mastoid air cells appear within normal limits. CT appearance of the orbits is within normal limits. Mild periorbital soft tissue swelling. 1. Mild soft tissue swelling without evidence of acute maxillofacial fracture. 2. Mild and nonspecific paranasal mucosal thickening involving the ethmoid air cells and sphenoid sinus. CT SPINE CERV WO CONT    Result Date: 7/7/2021  EXAM: CT Cervical spine INDICATION: Cervical neck pain after fall, trauma COMPARISON: No prior study. TECHNIQUE: Axial CT imaging of the cervical spine was performed from the skull base to the upper thoracic spine without intravenous contrast. Multiplanar reformats were generated.  One or more dose reduction techniques were used on this CT: automated exposure control, adjustment of the mAs and/or kVp according to patient size, and iterative reconstruction techniques. The specific techniques used on this CT exam have been documented in the patient's electronic medical record. Digital Imaging and Communications in Medicine (DICOM) format image data are available to nonaffiliated external healthcare facilities or entities on a secure, media free, reciprocally searchable basis with patient authorization for at least a 12-month period after this study. _______________ FINDINGS: VERTEBRAE AND DISCS: Coronal reformatted images demonstrate an intact and normal appearance to the occipital condyles. Atlantodental and atlantooccipital articulations both appear within normal limits. There is straightening of usual cervical lordosis without evidence of listhesis. Prior posterior anterior cervical discectomy and instrumented fusion is present spanning the C4-C6 segments. Osseous union appears solid. Prior left-sided laminectomies across the C4-C6 levels. Vertebral body heights are preserved. No evidence of displaced fracture. SPINAL CANAL AND FORAMINA: No high grade spinal canal or foramina stenosis is seen. PREVERTEBRAL SOFT TISSUES: No abnormal prevertebral soft tissue swelling within the limitations of artifact incurred from the indwelling fixation hardware. VISIBLE INTRACRANIAL CONTENTS: Unremarkable. LUNG APICES: Mosaic attenuation of the pulmonary parenchyma present. Dilated main pulmonary artery, estimated at approximately 4.0 cm in size. OTHER: None. _______________     1. Postop changes as described above without evidence of fracture or acute traumatic listhesis involving the cervical spine. 2. Dilated main pulmonary artery as can be seen with pulmonary hypertension.   > Mosaic attenuation of the included pulmonary parenchyma can be seen in the context of both small airways or small vessels disease.         Assessment & Plan:       Fracture of other part of scapula, left shoulder, initial encounter for closed fracture  -Patient status post fall out of wheelchair  -Right shoulder x-ray impacted, nondisplaced/nonangulated proximal left humerus fracture with dominant  involvement of the surgical neck.   -Pain management  -Orthopedics consulted  -Case management consult for home health versus SNF  -PT consult    CKD (chronic kidney disease) stage 3, GFR 30-59 ml/min ([facility])  -BUN 47/creatinine 1.80, stable when compared compared to 2 weeks ago  -Renal dose medications  -Continue to monitor BMP (BUN and creatinine) and electrolytes closely    Bipolar I disorder, current episode depressed ([facility])  -Stable   -continue BuSpar and Depakote    Atrial fibrillation with rapid ventricular response ([facility])  -Continue Eliquis and amiodarone  -Cardiac monitoring    Phantom limb pain ([facility])  -Continue Neurontin 300 mg 3 times a day    Essential (primary) hypertension  Chronic/controlled  -Continue Lasix  -Monitor BMP closely  -Monitor blood pressure closely    Class 2 obesity in adult  -BMI: 38.4  -adjusting lifestyle modification education      TOTAL TIME:  45 Minutes    Code Status: DNR/DNI    Prophylaxis: Eliquis    Electronically Signed : Sandro Manning, [facility] Medicine Service

## 2021-07-07 NOTE — Clinical Note
Patient Class[de-identified] OBSERVATION [865]   Type of Bed: Medical [8]   Reason for Observation: severe anemia   Admitting Diagnosis: Severe anemia [[id number]]   Admitting Physician: Marianela De Los Santos [[id number]]   Attending Physician: Marianela De Los Santos [[id number]]

## 2021-07-08 NOTE — PROGRESS NOTES
Dr. Tino Umaña in to see patient. Family states that patient cannot go home. Patient has had two falls this week. Stating that the patient is having impulse control and cognitive issues.

## 2021-07-08 NOTE — CONSULTS
Weight Loss Metrics 7/7/2021 6/23/2021 5/13/2021 3/23/2021 10/13/2020   Today's Wt 273 lb 14.4 oz 260 lb 260 lb 194 lb 12.8 oz 280 lb   BMI 40.45 kg/m2 38.4 kg/m2 39.53 kg/m2 29.62 kg/m2 42.57 kg/m2       Body mass index is 40.45 kg/m².         Past Medical History:   Diagnosis Date    Bipolar 1 disorder, depressed ([address]     CHF (congestive heart failure) (HCC)     Chronic neck and back pain     Frequent UTI     Hypercholesteremia     Hypertension     Obese     Psychiatric disorder        Past Surgical History:   Procedure Laterality Date    HX ABOVE KNEE AMPUTATION      right    HX CERVICAL FUSION      HX CHOLECYSTECTOMY      HX GASTRIC BYPASS      HX HYSTERECTOMY      HX ORTHOPAEDIC      neck surgery    HX PARTIAL THYROIDECTOMY         Current Facility-Administered Medications   Medication Dose Route Frequency Provider Last Rate Last Admin    HYDROcodone-acetaminophen (NORCO) 5-325 mg per tablet 1 Tablet  1 Tablet Oral Q4H PRN Nilson Phan MD        HYDROcodone-acetaminophen (NORCO) 5-325 mg per tablet 2 Tablet  2 Tablet Oral Q4H PRN Nilson Phan MD   2 Tablet at 07/08/21 1239    sodium chloride (NS) flush 5-40 mL  5-40 mL IntraVENous Q8H Radha Carpenter ACNP   10 mL at 07/08/21 3337    sodium chloride (NS) flush 5-40 mL  5-40 mL IntraVENous PRN Enzo Borden, ACNP        acetaminophen (TYLENOL) tablet 650 mg  650 mg Oral Q6H PRN Enzo Borden, ACNP   650 mg at 07/07/21 2145    Or    acetaminophen (TYLENOL) suppository 650 mg  650 mg Rectal Q6H PRN Joe Carpenter, NATALEEP        polyethylene glycol (MIRALAX) packet 17 g  17 g Oral DAILY PRN Joe Carpenter ACNP        ondansetron (ZOFRAN ODT) tablet 4 mg  4 mg Oral Q8H PRN Radha Carpenter ACNP        Or    ondansetron (ZOFRAN) injection 4 mg  4 mg IntraVENous Q6H PRN Joe Carpenter, ACNP        amiodarone (CORDARONE) tablet 200 mg  200 mg Oral DAILY Lizzy KAMARA ACNP   200 mg at 07/08/21 1002  busPIRone (BUSPAR) tablet 15 mg  15 mg Oral TID Margarite Kasia, ACNP   15 mg at 07/08/21 1002    divalproex DR (DEPAKOTE) tablet 500 mg  500 mg Oral DAILY Margarite [place], ACNP   500 mg at 07/08/21 1002    [Held by provider] apixaban (ELIQUIS) tablet 2.5 mg  2.5 mg Oral BID Clifm Fudge S, ACNP   2.5 mg at 07/07/21 2146    furosemide (LASIX) tablet 40 mg  40 mg Oral DAILY Margarite Kasia, ACNP   40 mg at 07/08/21 1002    gabapentin (NEURONTIN) capsule 300 mg  300 mg Oral TID Margarite [place], ACNP   300 mg at 07/08/21 1002    levothyroxine (SYNTHROID) tablet 200 mcg  200 mcg Oral Deneen Mayur S, ACNP   200 mcg at 07/08/21 0631    omega-3 fatty acids cap 1 Capsule (Patient Supplied)  1 Capsule Oral DAILY Kiara Carpenter, ACNP        therapeutic multivitamin [facility]) tablet 1 Tablet  1 Tablet Oral DAILY Margarite [place], ACNP   1 Tablet at 07/08/21 1002    cetirizine (ZYRTEC) tablet 10 mg  10 mg Oral DAILY Margarite [place], ACNP   10 mg at 07/08/21 1002    traZODone (DESYREL) tablet 150 mg  150 mg Oral QHS PRN Margarite [place], ACNP             Allergies   Allergen Reactions    Succinylcholine Chloride Unknown (comments)     Other reaction(s): Other (See Comments)  Chest pain radiating into neck    Codeine Unknown (comments)     Other reaction(s): Other (See Comments)  Abdominal pain unless \"synthetic Codeine\"    Hydromorphone Unknown (comments)     Patient states it makes her not stop talking, jittery      Hydromorphone (Bulk) Other (comments)     Makes crazy      Prednisone Unknown (comments)     Other reaction(s):  Other (See Comments)    \"It turns me into a witch,makes me mean\"    Topiramate Other (comments)       Social History     Tobacco Use    Smoking status: Never Smoker    Smokeless tobacco: Never Used   Vaping Use    Vaping Use: Never used   Substance Use Topics    Alcohol use: Not Currently     Comment: rare    Drug use: Never History reviewed. No pertinent family history. ROS      Visit Vitals  /60 (BP 1 Location: Right upper arm, BP Patient Position: At rest;Semi fowlers)   Pulse 71   Temp 97.4 °F (36.3 °C)   Resp 20   Ht 5' 9\" (1.753 m)   Wt 273 lb 14.4 oz (124.2 kg)   SpO2 92%   BMI 40.45 kg/m²       Patient is status post fall complaining of left shoulder pain. Denies any other complaints. Denies loss of consciousness. X-rays are positive for nondisplaced fracture of the proximal humerus 2 part fracture on AP and lateral.    Examination right upper extremity she is grossly neurovascular intact, full range of motion, no swelling, no skin lesions, excellent strength in all range of motion. Left shoulder decreased range of motion, well-padded splint, mild swelling, mild ecchymosis, positive point tenderness, no skin lesions. Weakness with strength. Impression: Left proximal humerus fracture. Plan: Plan will be for nonweightbearing left upper extremity, okay to take the sling off for showers and hygiene and for gentle range of motion, patient needs to follow-up in my office within 1 to 2 weeks post discharge. No heavy lifting.   Was discussed with the hospitalist.

## 2021-07-08 NOTE — PROGRESS NOTES
Received pt awake, A&Ox4. Breath sounds clear. Bowel sounds positive. PP to the LLE is positive. Redness and Non-pitting edema noted. Pt is a right AKA. Pt noted to have multiple bruises to the left side of the face; most notable to the left eye; dark purple in color. Pt has left shoulder sling in place. Pt has IV access to the right thumb; 20g. Pt has [address] in place. Light bruising to the left posterior knee that is light green in color. Pt has c/o general discomfort to the left shoulder that is radiating into the neck, pt defers any pain medication at this time. Will continue to monitor.

## 2021-07-08 NOTE — PROGRESS NOTES
Hospitalist Progress Note             Date of Service:  2021  NAME:  Shauna Malloy  :    MRN:  [id number]    Assessment & Plan:       Fracture of other part of scapula, left shoulder, initial encounter for closed fracture  -Patient status post fall out of wheelchair  -Right shoulder x-ray impacted, nondisplaced/nonangulated proximal left humerus fracture with dominant  involvement of the surgical neck.   -Pain management  -Orthopedics consulted- no surgical intervention, sling, follow up in 2 weeks  -Case management consult for home health versus SNF  -PT consult     CKD (chronic kidney disease) stage 3, GFR 30-59 ml/min ([facility])  -BUN 47/creatinine 1.80, stable when compared compared to 2 weeks ago  -Renal dose medications  -Continue to monitor BMP (BUN and creatinine) and electrolytes closely     Bipolar I disorder, current episode depressed ([facility])  -Stable   -continue BuSpar and Depakote     Atrial fibrillation with rapid ventricular response (HCC)  -Continue Eliquis and amiodarone  -Cardiac monitoring     Phantom limb pain ([facility])  -Continue Neurontin 300 mg 3 times a day     Essential (primary) hypertension  Chronic/controlled  -Continue Lasix  -Monitor BMP closely  -Monitor blood pressure closely     Class 2 obesity in adult  -BMI: 38.4  -adjusting lifestyle modification education      Awaiting case mgmt and PT, then dc to facility, family says cannot take home      Hospital Problems  Date Reviewed: 6/3/2021        Codes Class Noted POA    Atrial fibrillation with rapid ventricular response ([facility] [address] ICD-10-CM: I48.91  ICD-9-CM: 427.31  2013 Yes        Bipolar I disorder, current episode depressed ([facility] [address] ICD-10-CM: F31.9  ICD-9-CM: 296.50  3/10/2019 Yes        Essential (primary) hypertension ICD-10-CM: I10  ICD-9-CM: 401.9  2012 Yes        Phantom limb pain ([facility] [address] ICD-10-CM: G54.6  ICD-9-CM: 353.6 12/30/2018 Yes        Fracture of other part of scapula, left shoulder, initial encounter for closed fracture ICD-10-CM: E29.688M  ICD-9-CM: 811.09  7/7/2021 Unknown        Class 2 obesity in adult ICD-10-CM: E66.9  ICD-9-CM: 278.00  7/7/2021 Unknown        CKD (chronic kidney disease) stage 3, GFR 30-59 ml/min ([facility]) ICD-10-CM: N18.30  ICD-9-CM: 585.3  10/8/2019 Yes                Review of Systems:   A comprehensive review of systems was negative except for that written in the HPI. Vital Signs:    Last 24hrs VS reviewed since prior progress note. Most recent are:  Visit Vitals  /60 (BP 1 Location: Right upper arm, BP Patient Position: At rest;Semi fowlers)   Pulse 71   Temp 97.4 °F (36.3 °C)   Resp 20   Ht 5' 9\" (1.753 m)   Wt 124.2 kg (273 lb 14.4 oz)   SpO2 92%   BMI 40.45 kg/m²         Intake/Output Summary (Last 24 hours) at 7/8/2021 1456  Last data filed at 7/8/2021 0607  Gross per 24 hour   Intake    Output 1000 ml   Net -1000 ml        Physical Examination:             General:          Alert, cooperative, no distress, appears stated age. HEENT:           Atraumatic, anicteric sclerae, pink conjunctivae                          No oral ulcers, mucosa moist, throat clear, dentition fair  Neck:               Supple, symmetrical  Lungs:             Clear to auscultation bilaterally. No Wheezing or Rhonchi. No rales. Chest wall:      No tenderness  No Accessory muscle use. Heart:              Regular  rhythm,  No  murmur   No edema  Abdomen:        Soft, non-tender. Not distended. Bowel sounds normal  Extremities:     L arm in sling, tender  Skin:                scattered eccymosis  Psych:             Not anxious or agitated.   Neurologic:      Alert, moves all extremities, answers questions appropriately and responds to commands        Data Review:    Review and/or order of clinical lab test  Review and/or order of tests in the radiology section of CPT  Review and/or order of tests in the medicine section of [facility]      Labs:     Recent Labs     07/08/21 0445 07/07/21  1757   WBC 7.7 6.2   HGB 11.0* 10.8*   HCT 36.2 35.1    186     Recent Labs     07/08/21 0445 07/07/21  1757   * 134*   K 3.8 4.0   CL 98 98   CO2 27 25   BUN 40* 47*   CREA 1.50* 1.80*   GLU 95 101   CA 8.5 8.5   MG 2.2 2.3     No results for input(s): ALT, AP, TBIL, TBILI, TP, ALB, GLOB, GGT, AML, LPSE in the last 72 hours. No lab exists for component: SGOT, GPT, AMYP, HLPSE  No results for input(s): INR, PTP, APTT, INREXT in the last 72 hours. No results for input(s): FE, TIBC, PSAT, FERR in the last 72 hours. No results found for: FOL, RBCF   No results for input(s): PH, PCO2, PO2 in the last 72 hours. No results for input(s): CPK, CKNDX, TROIQ in the last 72 hours.     No lab exists for component: CPKMB  No results found for: CHOL, CHOLX, CHLST, CHOLV, HDL, HDLP, LDL, LDLC, DLDLP, TGLX, TRIGL, TRIGP, CHHD, CHHDX  Lab Results   Component Value Date/Time    Glucose (POC) 199 (H) 07/08/2021 10:55 AM     Lab Results   Component Value Date/Time    Color Yellow 07/07/2021 06:30 PM    Appearance Turbid 07/07/2021 06:30 PM    Specific gravity 1.014 07/07/2021 06:30 PM    pH (UA) 6.0 07/07/2021 06:30 PM    Protein 100 (A) 07/07/2021 06:30 PM    Glucose Negative 07/07/2021 06:30 PM    Ketone Negative 07/07/2021 06:30 PM    Bilirubin Negative 07/07/2021 06:30 PM    Urobilinogen 0.2 07/07/2021 06:30 PM    Nitrites Negative 07/07/2021 06:30 PM    Leukocyte Esterase Large (A) 07/07/2021 06:30 PM    Epithelial cells Few 07/07/2021 06:30 PM    Bacteria 2+ (A) 07/07/2021 06:30 PM    WBC >100 (H) 07/07/2021 06:30 PM    RBC 5-10 07/07/2021 06:30 PM         Medications Reviewed:     Current Facility-Administered Medications   Medication Dose Route Frequency    HYDROcodone-acetaminophen (NORCO) 5-325 mg per tablet 1 Tablet  1 Tablet Oral Q4H PRN    HYDROcodone-acetaminophen (NORCO) 5-325 mg per tablet 2 Tablet  2 Tablet Oral Q4H PRN    sodium chloride (NS) flush 5-40 mL  5-40 mL IntraVENous Q8H    sodium chloride (NS) flush 5-40 mL  5-40 mL IntraVENous PRN    acetaminophen (TYLENOL) tablet 650 mg  650 mg Oral Q6H PRN    Or    acetaminophen (TYLENOL) suppository 650 mg  650 mg Rectal Q6H PRN    polyethylene glycol (MIRALAX) packet 17 g  17 g Oral DAILY PRN    ondansetron (ZOFRAN ODT) tablet 4 mg  4 mg Oral Q8H PRN    Or    ondansetron (ZOFRAN) injection 4 mg  4 mg IntraVENous Q6H PRN    amiodarone (CORDARONE) tablet 200 mg  200 mg Oral DAILY    busPIRone (BUSPAR) tablet 15 mg  15 mg Oral TID    divalproex DR (DEPAKOTE) tablet 500 mg  500 mg Oral DAILY    apixaban (ELIQUIS) tablet 2.5 mg  2.5 mg Oral BID    furosemide (LASIX) tablet 40 mg  40 mg Oral DAILY    gabapentin (NEURONTIN) capsule 300 mg  300 mg Oral TID    levothyroxine (SYNTHROID) tablet 200 mcg  200 mcg Oral 6am    omega-3 fatty acids cap 1 Capsule (Patient Supplied)  1 Capsule Oral DAILY    therapeutic multivitamin (THERAGRAN) tablet 1 Tablet  1 Tablet Oral DAILY    cetirizine (ZYRTEC) tablet 10 mg  10 mg Oral DAILY    traZODone (DESYREL) tablet 150 mg  150 mg Oral QHS PRN     ______________________________________________________________________  EXPECTED LENGTH OF STAY: - - -  ACTUAL LENGTH OF STAY:          0                 Marcie Smith MD

## 2021-07-08 NOTE — PROGRESS NOTES
Reason for Admission:  Chart reviewed and noted patient presented with C/O left arm pain, S/P a fall from her W/C. Pt states she was at home in her W/C trying to get something from out of the cabinet. Patient lost her balance while in the W/C and fell to her left side injuring her left shoulder and hitting her face on the floor. Dx: Fracture of other part of Scapula, left shoulder, initial encounter for Closed Fracture. PMH: HTN, Atrial Fib, Hypothyroidism, Bipolar, CHF, Obesity                       RUR Score: NA                    Plan for utilizing home health: TBD         PCP: First and Last name:  Charissa Huynh MD     Name of Practice:    Are you a current patient: Yes/No:    Approximate date of last visit:    Can you participate in a virtual visit with your PCP:                     Current Advanced Directive/Advance Care Plan: DNR      Healthcare Decision Maker: Nelida Samson  Click here to complete [address] including selection of the Healthcare Decision Maker Relationship (ie \"Primary\")                             Transition of Care Plan: TBD    Care Management Interventions  PCP Verified by CM: Yes  Transition of Care Consult (CM Consult):  Discharge Planning  Current Support Network: - Nelida Samson- [phone]. Patient lives at home with her . She has a lift chair that she uses. She would like to go to a SNF for PT. SNF list given. Daughter Gayle Gatica [phone] will let us know tomorrow when they make the final decision of what SNF they have chosen. CM to follow.

## 2021-07-08 NOTE — PROGRESS NOTES
Problem: Mobility Impaired (Adult and Pediatric)  Goal: *Acute Goals and Plan of Care (Insert Text)  Description: Physical Therapy Goals  Initiated 7/8/2021 and to be accomplished within 7 day(s)  1. Patient will move from supine to sit and sit to supine , scoot up and down, and roll side to side in bed with moderate assistance . 2.  Patient will transfer from bed to chair and chair to bed with moderate assistance  using the least restrictive device. 3.  Patient will perform sit to stand with maximal assistance. 4.  Patient will propel w/c 25' with minimal assistance. PLOF: Pt used a w/c for mobility, she was able to perform stand pivots with SBA, she had assistance as needed for ADLs from her  and had an aide to came bathe her in a tub 1x/wk    Outcome: Progressing Towards Goal  PHYSICAL THERAPY EVALUATION    Patient: Ani Fox (33 y.o. female)  Date: 7/8/2021  Primary Diagnosis: Severe anemia [D64.9]  Fracture of other part of scapula, left shoulder, initial encounter for closed fracture [S42.192A]        Precautions:   Fall, NWB (L UE)    ASSESSMENT :  Based on the objective data described below, the patient presents with L shoulder fx with sling placed. She has bruising also noted to the L orbit of face and c/o moderately severe pain in the L shoulder. She is agreeable to muscle testing today but does not want to perform mobility due to pain. Patient would benefit from further P.T. to improve strength and transfers. They would likely benefit from [facility] once medically stable. Patient will benefit from skilled intervention to address the above impairments.   Patient's rehabilitation potential is considered to be Fair  Factors which may influence rehabilitation potential include:   []         None noted  []         Mental ability/status  [x]         Medical condition  []         Home/family situation and support systems  []         Safety awareness  []         Pain tolerance/management  []         Other:      PLAN :  Recommendations and Planned Interventions:   [x]           Bed Mobility Training             []    Neuromuscular Re-Education  [x]           Transfer Training                   []    Orthotic/Prosthetic Training  []           Gait Training                          []    Modalities  [x]           Therapeutic Exercises           []    Edema Management/Control  [x]           Therapeutic Activities            []    Family Training/Education  [x]           Patient Education  []           Other (comment):    Frequency/Duration: Patient will be followed by physical therapy 1-2 times per day/4-7 days per week to address goals. Discharge Recommendations: Skilled Nursing Facility  Further Equipment Recommendations for Discharge: N/A     SUBJECTIVE:   Patient stated I don't feel like doing much due to pain today.     OBJECTIVE DATA SUMMARY:     Past Medical History:   Diagnosis Date    Bipolar 1 disorder, depressed (HCC)     CHF (congestive heart failure) (HCC)     Chronic neck and back pain     Frequent UTI     Hypercholesteremia     Hypertension     Obese     Psychiatric disorder      Past Surgical History:   Procedure Laterality Date    HX ABOVE KNEE AMPUTATION      right    HX CERVICAL FUSION      HX CHOLECYSTECTOMY      HX GASTRIC BYPASS      HX HYSTERECTOMY      HX ORTHOPAEDIC      neck surgery    HX PARTIAL THYROIDECTOMY       Barriers to Learning/Limitations: None  Compensate with: N/A  Home Situation:  Home Situation  Home Environment: Private residence  Wheelchair Ramp: Yes  One/Two Story Residence: Two story, live on 1st floor  Living Alone: No  Support Systems: Family member(s)  Patient Expects to be Discharged to[de-identified] Skilled nursing facility  Current DME Used/Available at Home: Wheelchair  Critical Behavior:  Neurologic State: Alert;Drowsy  Orientation Level: Oriented X4  Strength:    Strength: Generally decreased, functional  RUE: 4/5  LUE: DNT  RLE: DNT; AKA  LLE: 3+/5, pain noted  Tone & Sensation:   Sensation: Intact  Range Of Motion:   AROM: Generally decreased, functional  PROM: Generally decreased, functional  RUE: Shoulder limited due to reported RCT  LUE: Currently in sling, DNT  RLE: DNT  LLE: WFL  Functional Mobility:  Bed Mobility:  Rolling: Other (comment) (Pt in too much pain refused mobility today)  Pain:  Pain level pre-treatment: 7/10   Pain level post-treatment: 7/10   Pain Location: L shoulder  Pain Intervention(s) : Medication (see MAR); Rest, Repositioning  Response to intervention: Nurse notified, See doc flow    Activity Tolerance:   Poor  Please refer to the flowsheet for vital signs taken during this treatment. After treatment:   []         Patient left in no apparent distress sitting up in chair  [x]         Patient left in no apparent distress in bed  [x]         Call bell left within reach  [x]         Nursing notified  []         Caregiver present  []         Bed alarm activated  []         SCDs applied    COMMUNICATION/EDUCATION:   [x]         Role of Physical Therapy in the acute care setting. []         Fall prevention education was provided and the patient/caregiver indicated understanding. [x]         Patient/family have participated as able in goal setting and plan of care. [x]         Patient/family agree to work toward stated goals and plan of care. []         Patient understands intent and goals of therapy, but is neutral about his/her participation. []         Patient is unable to participate in goal setting/plan of care: ongoing with therapy staff.  []         Other:     Thank you for this referral.  Pushpa Watkins, PT, DPT   Time Calculation: 15 mins

## 2021-07-08 NOTE — PROGRESS NOTES
CM called at this time; contact: Rashad Obrien (pts daughter) [phone]. Pt cannot go home as the pt has been a safety risk w/ multiple falls.

## 2021-07-08 NOTE — PROGRESS NOTES
Pt reposition in bed, no acute distress or sob noted, cannister emptied of urine 800cc noted. Call bell in reach.

## 2021-07-08 NOTE — PROGRESS NOTES
Pt arrived to floor via stretcher to room 255, pt alert and verbal, imobilizer to lt arm in place, pt tolerated transfer from stretcher to bed using slide board, family at bedside, pt oriented to room and call bell system, purwick put in place, no other needs voiced, call bell in reach.

## 2021-07-08 NOTE — PROGRESS NOTES
Chart check prior to weekly follow up shows that the patient is currently admitted to [facility] for closed fracture of left humerus. Patient fell out of her wheel chair at home  CTN will monitor chart for discharge and disposition.

## 2021-07-09 NOTE — PROGRESS NOTES
Pt  received full bed bath and linen change, upon giving bed bath pts sling in place and nurse attempted to remove gown, pt screamed at nurse stating \"just cut it off, just cut it off, my  said cut the gown its mine, gown removed by cutting it, pt states \"its okay you had to do what you had to do, pt reposition, bed in lowest position, call bell in reach.

## 2021-07-09 NOTE — PROGRESS NOTES
Pt given HS meds and PRN trazadone, pt cleaned of incontinence, new purwick put in place and tolerated, call bell in reach.

## 2021-07-09 NOTE — PROGRESS NOTES
Spoke with pt and she states that she and her  have chosen [facility] for SNF care. Adms director at [facility] called by CM and left JESSIE.

## 2021-07-09 NOTE — PROGRESS NOTES
Problem: Falls - Risk of  Goal: *Absence of Falls  Description: Document [place] Pillow Fall Risk and appropriate interventions in the flowsheet. Outcome: Progressing Towards Goal  Note: Fall Risk Interventions:  Mobility Interventions: Utilize walker, cane, or other assistive device, Strengthening exercises (ROM-active/passive), Communicate number of staff needed for ambulation/transfer         Medication Interventions: Assess postural VS orthostatic hypotension, Patient to call before getting OOB, Teach patient to arise slowly, Utilize gait belt for transfers/ambulation    Elimination Interventions: Call light in reach, Toilet paper/wipes in reach, Toileting schedule/hourly rounds, Patient to call for help with toileting needs    History of Falls Interventions: Bed/chair exit alarm, Door open when patient unattended, Vital signs minimum Q4HRs X 24 hrs (comment for end date)         Problem: Patient Education: Go to Patient Education Activity  Goal: Patient/Family Education  Outcome: Progressing Towards Goal     Problem: Pressure Injury - Risk of  Goal: *Prevention of pressure injury  Description: Document Ethan Scale and appropriate interventions in the flowsheet. Outcome: Progressing Towards Goal  Note: Pressure Injury Interventions:  Sensory Interventions: Assess changes in LOC, Keep linens dry and wrinkle-free, Float heels, Maintain/enhance activity level, Minimize linen layers, Monitor skin under medical devices, Turn and reposition approx. every two hours (pillows and wedges if needed)    Moisture Interventions: Absorbent underpads, Internal/External urinary devices, Maintain skin hydration (lotion/cream), Minimize layers, Moisture barrier, Check for incontinence Q2 hours and as needed, Apply protective barrier, creams and emollients    Activity Interventions: PT/OT evaluation    Mobility Interventions: HOB 30 degrees or less, Turn and reposition approx.  every two hours(pillow and wedges)    Nutrition Interventions: Document food/fluid/supplement intake, Offer support with meals,snacks and hydration    Friction and Shear Interventions: Apply protective barrier, creams and emollients, Feet elevated on foot rest, HOB 30 degrees or less, Lift team/patient mobility team                Problem: Patient Education: Go to Patient Education Activity  Goal: Patient/Family Education  Outcome: Progressing Towards Goal     Problem: Patient Education: Go to Patient Education Activity  Goal: Patient/Family Education  Outcome: Progressing Towards Goal

## 2021-07-09 NOTE — PROGRESS NOTES
Problem: Mobility Impaired (Adult and Pediatric)  Goal: *Acute Goals and Plan of Care (Insert Text)  Description: Physical Therapy Goals  Initiated 7/8/2021 and to be accomplished within 7 day(s)  1. Patient will move from supine to sit and sit to supine , scoot up and down, and roll side to side in bed with moderate assistance . 2.  Patient will transfer from bed to chair and chair to bed with moderate assistance  using the least restrictive device. 3.  Patient will perform sit to stand with maximal assistance. 4.  Patient will propel w/c 25' with minimal assistance. PLOF: Pt used a w/c for mobility, she was able to perform stand pivots with SBA, she had assistance as needed for ADLs from her  and had an aide to came bathe her in a tub 1x/wk      Outcome: Progressing Towards Goal   PHYSICAL THERAPY TREATMENT    Patient: Maryan Maria (6 y.o. female)  Date: 7/9/2021  Diagnosis: Severe anemia [D64.9]  Fracture of other part of scapula, left shoulder, initial encounter for closed fracture [S42.192A] <principal problem not specified>       Precautions: Fall, NWB (L UE)    ASSESSMENT:  Pt agreeable to bed mobility today. She requires significant amount of assistance to perform tasks. She is able to sit on the side of the bed for several minutes and perform some LAQs. She then returns to supine and performs a set of SLR. She is positioned for comfort and left with all needs met. Progression toward goals:   []      Improving appropriately and progressing toward goals  [x]      Improving slowly and progressing toward goals  []      Not making progress toward goals and plan of care will be adjusted     PLAN:  Patient continues to benefit from skilled intervention to address the above impairments. Continue treatment per established plan of care.   Discharge Recommendations:  [facility] or [facility]  Further Equipment Recommendations for Discharge:  N/A     SUBJECTIVE:   Patient stated  I'm not hurting as bad as yesterday.     OBJECTIVE DATA SUMMARY:   Critical Behavior:  Neurologic State: Alert, Appropriate for age  Orientation Level: Oriented X4  Functional Mobility Training:  Bed Mobility:  Rolling: Moderate assistance  Supine to Sit: Moderate assistance  Sit to Supine: Maximum assistance  Scooting: Maximum assistance; Total assistance  Balance:  Sitting: Impaired  Sitting - Static: Poor (constant support)  Sitting - Dynamic: Poor (constant support)   Range Of Motion:  Therapeutic Exercises:       EXERCISE   Sets   Reps   Active Active Assist   Passive Self ROM   Comments   LAQs  20 [x] [] [] []    Straight Leg Raises  10 [x] [] [] []      Pain:  Pain level pre-treatment: 5/10  Pain level post-treatment: 5/10   Pain Location: Shoulders  Pain Intervention(s): Medication (see MAR); Rest, Ice, Repositioning   Response to intervention: Nurse notified, See doc flow    Activity Tolerance:   Poor  Please refer to the flowsheet for vital signs taken during this treatment. After treatment:   [] Patient left in no apparent distress sitting up in chair  [x] Patient left in no apparent distress in bed  [x] Call bell left within reach  [x] Nursing notified  [] Caregiver present  [] Bed alarm activated  [] SCDs applied      COMMUNICATION/EDUCATION:   [x]         Role of Physical Therapy in the acute care setting. []         Fall prevention education was provided and the patient/caregiver indicated understanding. [x]         Patient/family have participated as able in working toward goals and plan of care. [x]         Patient/family agree to work toward stated goals and plan of care. []         Patient understands intent and goals of therapy, but is neutral about his/her participation.   []         Patient is unable to participate in stated goals/plan of care: ongoing with therapy staff.  []         Other:        Toby Lay, PT, DPT   Time Calculation: 13 mins

## 2021-07-09 NOTE — PROGRESS NOTES
7271- Patient complains of pain in left shoulder/arm, received pain medication PRN, alert and oriented, Purewick in place, no acute distress noted, call bell in reach, bed in lowest position.     5294- AM medications administered at this time    1300- Repositioned    1342- PRN pain medications administered at this time

## 2021-07-09 NOTE — PROGRESS NOTES
Hospitalist Progress Note             Date of Service:  2021  NAME:  Rahle Hurt  :  [id number]  MRN:  [id number]    Assessment & Plan:       Fracture of other part of scapula, left shoulder, initial encounter for closed fracture  -Patient status post fall out of wheelchair  -Right shoulder x-ray impacted, nondisplaced/nonangulated proximal left humerus fracture with dominant  involvement of the surgical neck.   -Pain management  -Orthopedics consulted- no surgical intervention, sling, follow up in 2 weeks  -Case management consult for home health versus SNF  -PT consult     CKD (chronic kidney disease) stage 3, GFR 30-59 ml/min (HCC)  -1.4, stable when compared compared to 2 weeks ago  -Renal dose medications  -Continue to monitor BMP (BUN and creatinine) and electrolytes closely     Bipolar I disorder, current episode depressed ([facility]  -Stable   -continue BuSpar and Depakote     Atrial fibrillation with rapid ventricular response (HCC)  -Continue Eliquis and amiodarone  -Cardiac monitoring     Phantom limb pain (HCC)  -Continue Neurontin 300 mg 3 times a day     Essential (primary) hypertension  Chronic/controlled  -Continue Lasix  -Monitor BMP closely  -Monitor blood pressure closely     Class 2 obesity in adult  -BMI: 38.4  -adjusting lifestyle modification education    Mild Hyponatremia  - resume lasix, pain control, repeat in am vs next week at faciltiy if leaves        Awaiting case mgmt and PT, then dc to facility, family says cannot take home        Hospital Problems  Date Reviewed: 6/3/2021        Codes Class Noted POA    Atrial fibrillation with rapid ventricular response ([facility] ICD-10-CM: I48.91  ICD-9-CM: 427.31  2013 Yes        Bipolar I disorder, current episode depressed ([facility] ICD-10-CM: F31.9  ICD-9-CM: 296.50  3/10/2019 Yes        Essential (primary) hypertension ICD-10-CM: I10  ICD-9-CM: 401.9 8/2/2012 Yes        Phantom limb pain (HCC) ICD-10-CM: G54.6  ICD-9-CM: 353.6  12/30/2018 Yes        Fracture of other part of scapula, left shoulder, initial encounter for closed fracture ICD-10-CM: T89.232S  ICD-9-CM: 811.09  7/7/2021 Unknown        Class 2 obesity in adult ICD-10-CM: E66.9  ICD-9-CM: 278.00  7/7/2021 Unknown        CKD (chronic kidney disease) stage 3, GFR 30-59 ml/min (HCC) ICD-10-CM: N18.30  ICD-9-CM: 585.3  10/8/2019 Yes                Review of Systems:   A comprehensive review of systems was negative except for that written in the HPI. Vital Signs:    Last 24hrs VS reviewed since prior progress note. Most recent are:  Visit Vitals  /65 (BP 1 Location: Right upper arm, BP Patient Position: At rest)   Pulse 81   Temp 96.9 °F (36.1 °C)   Resp 19   Ht 5' 9\" (1.753 m)   Wt 124.6 kg (274 lb 11.2 oz)   SpO2 96%   BMI 40.57 kg/m²       No intake or output data in the 24 hours ending 07/09/21 1128     Physical Examination:             General:          Alert, cooperative, no distress, appears stated age. HEENT:           Atraumatic, anicteric sclerae, pink conjunctivae                          No oral ulcers, mucosa moist, throat clear, dentition fair  Neck:               Supple, symmetrical  Lungs:             Clear to auscultation bilaterally. No Wheezing or Rhonchi. No rales. Chest wall:      No tenderness  No Accessory muscle use. Heart:              Regular  rhythm,  No  murmur   No edema  Abdomen:        Soft, non-tender. Not distended. Bowel sounds normal  Extremities:     L arm in sling  Skin:                scattered eccymosis  Psych:             Not anxious or agitated.   Neurologic:      Alert, moves all extremities, answers questions appropriately and responds to commands        Data Review:    Review and/or order of clinical lab test  Review and/or order of tests in the radiology section of CPT  Review and/or order of tests in the medicine section of CPT      Labs: Recent Labs     07/09/21  0400 07/08/21 0445   WBC 8.7 7.7   HGB 12.2 11.0*   HCT 40.2 36.2    197     Recent Labs     07/09/21  0400 07/08/21 0445 07/07/21  1757   * 134* 134*   K 4.6 3.8 4.0   CL 96 98 98   CO2 26 27 25   BUN 41* 40* 47*   CREA 1.40* 1.50* 1.80*   * 95 101   CA 8.7 8.5 8.5   MG 1.9 2.2 2.3     No results for input(s): ALT, AP, TBIL, TBILI, TP, ALB, GLOB, GGT, AML, LPSE in the last 72 hours. No lab exists for component: SGOT, GPT, AMYP, HLPSE  No results for input(s): INR, PTP, APTT, INREXT in the last 72 hours. No results for input(s): FE, TIBC, PSAT, FERR in the last 72 hours. No results found for: FOL, RBCF   No results for input(s): PH, PCO2, PO2 in the last 72 hours. No results for input(s): CPK, CKNDX, TROIQ in the last 72 hours.     No lab exists for component: CPKMB  No results found for: CHOL, CHOLX, CHLST, CHOLV, HDL, HDLP, LDL, LDLC, DLDLP, TGLX, TRIGL, TRIGP, CHHD, CHHDX  Lab Results   Component Value Date/Time    Glucose (POC) 90 07/08/2021 03:37 PM    Glucose (POC) 199 (H) 07/08/2021 10:55 AM     Lab Results   Component Value Date/Time    Color Yellow 07/07/2021 06:30 PM    Appearance Turbid 07/07/2021 06:30 PM    Specific gravity 1.014 07/07/2021 06:30 PM    pH (UA) 6.0 07/07/2021 06:30 PM    Protein 100 (A) 07/07/2021 06:30 PM    Glucose Negative 07/07/2021 06:30 PM    Ketone Negative 07/07/2021 06:30 PM    Bilirubin Negative 07/07/2021 06:30 PM    Urobilinogen 0.2 07/07/2021 06:30 PM    Nitrites Negative 07/07/2021 06:30 PM    Leukocyte Esterase Large (A) 07/07/2021 06:30 PM    Epithelial cells Few 07/07/2021 06:30 PM    Bacteria 2+ (A) 07/07/2021 06:30 PM    WBC >100 (H) 07/07/2021 06:30 PM    RBC 5-10 07/07/2021 06:30 PM         Medications Reviewed:     Current Facility-Administered Medications   Medication Dose Route Frequency    HYDROcodone-acetaminophen (NORCO) 5-325 mg per tablet 1 Tablet  1 Tablet Oral Q4H PRN    HYDROcodone-acetaminophen (NORCO) 5-325 mg per tablet 2 Tablet  2 Tablet Oral Q4H PRN    sodium chloride (NS) flush 5-40 mL  5-40 mL IntraVENous Q8H    sodium chloride (NS) flush 5-40 mL  5-40 mL IntraVENous PRN    acetaminophen (TYLENOL) tablet 650 mg  650 mg Oral Q6H PRN    Or    acetaminophen (TYLENOL) suppository 650 mg  650 mg Rectal Q6H PRN    polyethylene glycol (MIRALAX) packet 17 g  17 g Oral DAILY PRN    ondansetron (ZOFRAN ODT) tablet 4 mg  4 mg Oral Q8H PRN    Or    ondansetron (ZOFRAN) injection 4 mg  4 mg IntraVENous Q6H PRN    amiodarone (CORDARONE) tablet 200 mg  200 mg Oral DAILY    busPIRone (BUSPAR) tablet 15 mg  15 mg Oral TID    divalproex DR (DEPAKOTE) tablet 500 mg  500 mg Oral DAILY    apixaban (ELIQUIS) tablet 2.5 mg  2.5 mg Oral BID    furosemide (LASIX) tablet 40 mg  40 mg Oral DAILY    gabapentin (NEURONTIN) capsule 300 mg  300 mg Oral TID    levothyroxine (SYNTHROID) tablet 200 mcg  200 mcg Oral 6am    omega-3 fatty acids cap 1 Capsule (Patient Supplied)  1 Capsule Oral DAILY    therapeutic multivitamin (THERAGRAN) tablet 1 Tablet  1 Tablet Oral DAILY    cetirizine (ZYRTEC) tablet 10 mg  10 mg Oral DAILY    traZODone (DESYREL) tablet 150 mg  150 mg Oral QHS PRN     ______________________________________________________________________  EXPECTED LENGTH OF STAY: - - -  ACTUAL LENGTH OF STAY:          0                 Rich Johnson MD

## 2021-07-10 NOTE — PROGRESS NOTES
VS obtained and BP of 107/47 reported to primary nurse. PIV flushed, locked and capped. Assisted pt with applying Deoderant, blanket given. Pt reort pain 5/10 and states her pain only goes as low as a 3-4/10. No further needs voiced at this time.  CBAR

## 2021-07-10 NOTE — PROGRESS NOTES
Patient sleeping but rouses to voice and touch. Assessment complete. Patient denies pain. No needs voiced at this time. CBWR.

## 2021-07-10 NOTE — PROGRESS NOTES
Bedside shift change report given to Adriana Turcios RN by Maximilian Lowry RN. Report included the following information, SBAR.

## 2021-07-10 NOTE — PROGRESS NOTES
Bedside shift change report given to Rosmery Calle RN by Nandini Munguia RN. Report included the following information, SBAR.

## 2021-07-10 NOTE — PROGRESS NOTES
Patient assisted onto bedpan. No bowel movement, just flatus. Patient turned and repositioned. PureWick in place. Oral care provided per patient request. CBWR.

## 2021-07-10 NOTE — PROGRESS NOTES
Spoke to [facility] the admissions director at [employer]. Clinical sent, pt will need an authorization for SNF. CM to follow.

## 2021-07-10 NOTE — PROGRESS NOTES
Pt room warm despite temperature being adjusted to lowest setting. Family brought in a fan. Offered and encouraged pt to allow staff to transfer to another room,  Pt refusing at this time.

## 2021-07-10 NOTE — PROGRESS NOTES
Hospitalist Progress Note     INTERNAL MEDICINE PROGRESS NOTE  Patient: Arturo Hurd   YOB: 1945   MRN: [id number]      Hospital course / Assessment    Principle Problems:  Fracture of other part of scapula, left shoulder  -Patient status post fall out of wheelchair  -Right shoulder x-ray impacted, nondisplaced/nonangulated proximal left humerus fracture with dominant involvement of the surgical neck. -Orthopedics consulted- no surgical intervention, sling, follow up in 2 weeks  -Case management consult and [facility]  - Continue with PT and pain control   CKD (chronic kidney disease) stage 3, GFR 30-59 ml/min ([facility])  -1.2, stable when compared compared to 2 weeks ago  - Avoid nephrotoxin and dehydration     Active Problems:  Bipolar I disorder - Stable on BuSpar and Depakote  Atrial fibrillation with RVR rate controlled on Eliquis and amiodarone  Phantom limb pain on Neurontin 300 mg 3 times a day  Essential (primary) hypertension - Chronic/controlled  Class 2 obesity in adult BMI: 38.4, Counseled     Code Status: DNR  DVT prophylaxis: pharmacologic and mechanical  Today Recommendation and Plan:    To SNF for rehab when bed available, CM following     Disposition    Disposition: SNF     Subjective / ROS:   Patient states she is fine , no  New complain      Medical Decision Making   Chart, Images and Lab data reviewed, necessary medical Orders placed   Discussed with nursing staff     Vitals:    21 2327 07/10/21 0510 07/10/21 0517 07/10/21 0735   BP: (!) 99/54 (!) 122/44  120/63   Pulse: 95 90  86   Resp: 18 18  18   Temp: 99.2 °F (37.3 °C) 98.5 °F (36.9 °C)  97.7 °F (36.5 °C)   SpO2: 95% 92%  95%   Weight:   125.2 kg (276 lb)    Height:         Temp (24hrs), Av.3 °F (36.8 °C), Min:97.3 °F (36.3 °C), Max:99.7 °F (37.6 °C)      Intake/Output Summary (Last 24 hours) at 7/10/2021 0915  Last data filed at 7/10/2021 0513  Gross per 24 hour Intake 300 ml   Output 850 ml   Net -550 ml       Physical Exam:   General Appearance:   Appears in no acute distress. ,facial bruising   HEENT:   Moist oral mucous membranes, conjunctiva clear,   Neck:   Supple  Lungs: No wheezes. , No rales. , Normal respiratory effort,   Heart:   Regular rate and rhythm  Abdomen:   Soft , Non-distended and Non-tender,   Extremities:   no edema of legs  Neuro:   alert, oriented, moves all extremities well    Current medications:     Current Facility-Administered Medications   Medication Dose Route Frequency    diphenhydrAMINE (BENADRYL) tablet 25 mg  25 mg Oral Q6H PRN    alum-mag hydroxide-simeth (MYLANTA) oral suspension 30 mL  30 mL Oral Q4H PRN    HYDROcodone-acetaminophen (NORCO) 5-325 mg per tablet 1 Tablet  1 Tablet Oral Q4H PRN    HYDROcodone-acetaminophen (NORCO) 5-325 mg per tablet 2 Tablet  2 Tablet Oral Q4H PRN    sodium chloride (NS) flush 5-40 mL  5-40 mL IntraVENous Q8H    sodium chloride (NS) flush 5-40 mL  5-40 mL IntraVENous PRN    acetaminophen (TYLENOL) tablet 650 mg  650 mg Oral Q6H PRN    Or    acetaminophen (TYLENOL) suppository 650 mg  650 mg Rectal Q6H PRN    polyethylene glycol (MIRALAX) packet 17 g  17 g Oral DAILY PRN    ondansetron (ZOFRAN ODT) tablet 4 mg  4 mg Oral Q8H PRN    Or    ondansetron (ZOFRAN) injection 4 mg  4 mg IntraVENous Q6H PRN    amiodarone (CORDARONE) tablet 200 mg  200 mg Oral DAILY    busPIRone (BUSPAR) tablet 15 mg  15 mg Oral TID    divalproex DR (DEPAKOTE) tablet 500 mg  500 mg Oral DAILY    apixaban (ELIQUIS) tablet 2.5 mg  2.5 mg Oral BID    furosemide (LASIX) tablet 40 mg  40 mg Oral DAILY    gabapentin (NEURONTIN) capsule 300 mg  300 mg Oral TID    levothyroxine (SYNTHROID) tablet 200 mcg  200 mcg Oral 6am    omega-3 fatty acids cap 1 Capsule (Patient Supplied)  1 Capsule Oral DAILY    therapeutic multivitamin (THERAGRAN) tablet 1 Tablet  1 Tablet Oral DAILY    cetirizine (ZYRTEC) tablet 10 mg  10 mg Oral DAILY    traZODone (DESYREL) tablet 150 mg  150 mg Oral QHS PRN          Laboratory and Radiology Data :      No results found for: FERR  WBC   Date Value Ref Range Status   07/10/2021 7.6 4.6 - 13.2 K/uL Final     No results found for: DELILAH  No results found for: UEO    Microbiology    No results found for: GMS    Recent Results (from the past 24 hour(s))   CBC WITH AUTOMATED DIFF    Collection Time: 07/10/21  4:00 AM   Result Value Ref Range    WBC 7.6 4.6 - 13.2 K/uL    RBC 3.14 (L) 4.20 - 5.30 M/uL    HGB 10.8 (L) 12.0 - 16.0 g/dL    HCT 35.7 35.0 - 45.0 %    .7 (H) 74.0 - 97.0 FL    MCH 34.4 (H) 24.0 - 34.0 PG    MCHC 30.3 (L) 31.0 - 37.0 g/dL    RDW 12.1 11.6 - 14.5 %    PLATELET 804 639 - 441 K/uL    MPV 9.6 9.2 - 11.8 FL    NEUTROPHILS 57 40 - 73 %    LYMPHOCYTES 28 21 - 52 %    MONOCYTES 14 (H) 3 - 10 %    EOSINOPHILS 1 0 - 5 %    BASOPHILS 0 0 - 2 %    IMMATURE GRANULOCYTES 0 %    ABS. NEUTROPHILS 4.3 1.8 - 8.0 K/UL    ABS. LYMPHOCYTES 2.1 0.9 - 3.6 K/UL    ABS. MONOCYTES 1.1 0.05 - 1.2 K/UL    ABS. EOSINOPHILS 0.1 0.0 - 0.4 K/UL    ABS. BASOPHILS 0.0 0.0 - 0.1 K/UL    ABS. IMM.  GRANS. 0.0 K/UL    DF Manual      RBC COMMENTS Macrocytosis  1+        RBC COMMENTS Anisocytosis  1+       MAGNESIUM    Collection Time: 07/10/21  4:00 AM   Result Value Ref Range    Magnesium 2.0 1.7 - 2.8 mg/dL   METABOLIC PANEL, BASIC    Collection Time: 07/10/21  4:00 AM   Result Value Ref Range    Sodium 133 (L) 135 - 145 mmol/L    Potassium 4.2 3.2 - 5.1 mmol/L    Chloride 98 94 - 111 mmol/L    CO2 27 21 - 33 mmol/L    Anion gap 8 mmol/L    Glucose 100 70 - 110 mg/dL    BUN 47 (H) 9 - 21 mg/dL    Creatinine 1.20 0.70 - 1.20 mg/dL    BUN/Creatinine ratio 39      GFR est AA 53 ml/min/1.73m2    GFR est non-AA 44 ml/min/1.73m2    Calcium 8.3 (L) 8.5 - 10.5 mg/dL   PHOSPHORUS    Collection Time: 07/10/21  4:00 AM   Result Value Ref Range    Phosphorus 3.4 2.5 - 4.5 mg/dL       XR Results:  Results from [facility] Encounter encounter on 07/07/21    XR SHOULDER LT AP/LAT MIN 2 V    Narrative  EXAM: XR SHOULDER LT AP/LAT MIN 2 V    CLINICAL INDICATION/HISTORY: Pain  -Additional: Left shoulder pain after fall    COMPARISON: None    TECHNIQUE: 3 views of the left shoulder    _______________    FINDINGS:    BONES: Minimally displaced and mildly impacted fracture of the proximal humerus,  involving the surgical neck and to lesser extent greater tuberosity. No  significant angulation demonstrated. Mild degenerative changes across the  acromioclavicular joint. SOFT TISSUES: Included left lung and left chest wall appear clear.    _______________    Impression  Impacted, nondisplaced/nonangulated proximal left humerus fracture with dominant  involvement of the surgical neck. CT Results:  Results from Hospital Encounter encounter on 07/07/21    CT MAXILLOFACIAL WO CONT    Narrative  Maxillofacial CT    History: Facial bruising, pain, fall, trauma    Technique: Multiple axial CT images of the maxillofacial structures including  the mandible were performed. Additional coronal and sagittal reformations were  also performed. One or more dose reduction techniques were used on this CT:  automated exposure control, adjustment of the mAs and/or kVp according to  patient size, and iterative reconstruction techniques. The specific techniques  used on this CT exam have been documented in the patient's electronic medical  record. Digital Imaging and Communications in Medicine (DICOM) format image  data are available to nonaffiliated external healthcare facilities or entities  on a secure, media free, reciprocally searchable basis with patient  authorization for at least a 12-month period after this study. Findings: The frontal bone, bilateral lateral orbital walls, zygomatic arches, and medial  orbital walls are intact. Maxillary sinuses intact. Pterygoid plates and  anterior maxillary spine appear within normal limits.  Nasal bones intact. Temporomandibular joint alignment appears within normal limits. Mandible appears  intact. There is mild mucosal thickening of the anterior posterior ethmoid air cells. Evidence of prior medial antrostomy. Opacification of the sphenoid sinus. Remaining imaged paranasal sinuses and mastoid air cells appear within normal  limits. CT appearance of the orbits is within normal limits. Mild periorbital soft  tissue swelling. Impression  1. Mild soft tissue swelling without evidence of acute maxillofacial fracture. 2. Mild and nonspecific paranasal mucosal thickening involving the ethmoid air  cells and sphenoid sinus. MRI Results:  No results found for this or any previous visit. Nuclear Medicine Results:  No results found for this or any previous visit. US Results:  No results found for this or any previous visit. IR Results:  No results found for this or any previous visit. VAS/US Results:  No results found for this or any previous visit. Rosalind Quintana M.D.   Hospitalist

## 2021-07-11 PROBLEM — S42.90XA SHOULDER FRACTURE: Status: ACTIVE | Noted: 2021-01-01

## 2021-07-11 NOTE — PROGRESS NOTES
Morning medication given pt tolerated well. Brief clean and dry. Pure wick canister emptied. PIV flushed locked and capped.  CBWR

## 2021-07-11 NOTE — PROGRESS NOTES
Problem: Falls - Risk of  Goal: *Absence of Falls  Description: Document Alisha Belle Fall Risk and appropriate interventions in the flowsheet.   Outcome: Progressing Towards Goal  Note: Fall Risk Interventions:  Mobility Interventions: Bed/chair exit alarm         Medication Interventions: Bed/chair exit alarm, Patient to call before getting OOB    Elimination Interventions: Bed/chair exit alarm, Call light in reach    History of Falls Interventions: Bed/chair exit alarm         Problem: Nutrition Deficit  Goal: *Optimize nutritional status  Outcome: Progressing Towards Goal

## 2021-07-11 NOTE — PROGRESS NOTES
Pt resting in bed with eyes open on cell phone. Pt expressed no needs or concerns at this present moment. Safety measures are intact.

## 2021-07-11 NOTE — PROGRESS NOTES
Bedside shift change report given to Cesar Walls RN by Kendal Malone RN. Report included the following information, SBAR. Patient awake and resting with H.O.B. elevated at 60 degrees. Patient denies needs at this time. CBWR.

## 2021-07-11 NOTE — ROUTINE PROCESS
Received pt in bed with eyes open. Pt needed water at this present moment. Pt c/o no needs or assistance at this present moment. Call bell is in reach and safety measures are intact.

## 2021-07-11 NOTE — PROGRESS NOTES
Patient no longer wants to go to [facility]. She wants to go to [address] [place] [address] in [place], [place]. Will get in touch with them tomorrow. CM to follow.

## 2021-07-11 NOTE — PROGRESS NOTES
HOSPITALIST PROGRESS NOTE  Zeniada Walsh MD, [address]         Daily Progress Note: 7/11/2021      Subjective:   Patient is alert and oriented x4. Large ecchymoses seen on the left side of the face and forehead. No overnight chest pain, shortness of breath, hypoxia, nausea/vomiting noted. Continues to await placement to SNF for further rehab.       Medications reviewed  Current Facility-Administered Medications   Medication Dose Route Frequency    diphenhydrAMINE (BENADRYL) tablet 25 mg  25 mg Oral Q6H PRN    alum-mag hydroxide-simeth (MYLANTA) oral suspension 30 mL  30 mL Oral Q4H PRN    HYDROcodone-acetaminophen (NORCO) 5-325 mg per tablet 1 Tablet  1 Tablet Oral Q4H PRN    HYDROcodone-acetaminophen (NORCO) 5-325 mg per tablet 2 Tablet  2 Tablet Oral Q4H PRN    sodium chloride (NS) flush 5-40 mL  5-40 mL IntraVENous Q8H    sodium chloride (NS) flush 5-40 mL  5-40 mL IntraVENous PRN    acetaminophen (TYLENOL) tablet 650 mg  650 mg Oral Q6H PRN    Or    acetaminophen (TYLENOL) suppository 650 mg  650 mg Rectal Q6H PRN    polyethylene glycol (MIRALAX) packet 17 g  17 g Oral DAILY PRN    ondansetron (ZOFRAN ODT) tablet 4 mg  4 mg Oral Q8H PRN    Or    ondansetron (ZOFRAN) injection 4 mg  4 mg IntraVENous Q6H PRN    amiodarone (CORDARONE) tablet 200 mg  200 mg Oral DAILY    busPIRone (BUSPAR) tablet 15 mg  15 mg Oral TID    divalproex DR (DEPAKOTE) tablet 500 mg  500 mg Oral DAILY    apixaban (ELIQUIS) tablet 2.5 mg  2.5 mg Oral BID    furosemide (LASIX) tablet 40 mg  40 mg Oral DAILY    gabapentin (NEURONTIN) capsule 300 mg  300 mg Oral TID    levothyroxine (SYNTHROID) tablet 200 mcg  200 mcg Oral 6am    omega-3 fatty acids cap 1 Capsule (Patient Supplied)  1 Capsule Oral DAILY    therapeutic multivitamin (THERAGRAN) tablet 1 Tablet  1 Tablet Oral DAILY    cetirizine (ZYRTEC) tablet 10 mg 10 mg Oral DAILY    traZODone (DESYREL) tablet 150 mg  150 mg Oral QHS PRN       Review of Systems:   A comprehensive review of systems was negative except for that written in the HPI. Objective:   Physical Exam:     Visit Vitals  BP (!) 115/50 (BP 1 Location: Right upper arm, BP Patient Position: At rest)   Pulse 76   Temp 97.1 °F (36.2 °C)   Resp 17   Ht 5' 9\" (1.753 m)   Wt 124.3 kg (274 lb 1.6 oz)   SpO2 91%   BMI 40.48 kg/m²      O2 Device: None (Room air)  Patient Vitals for the past 8 hrs:   Temp Pulse Resp BP SpO2   21 1238 97.1 °F (36.2 °C) 76 17 (!) 115/50 91 %   21 0830 97.9 °F (36.6 °C) 91 17 (!) 106/58 91 %          Temp (24hrs), Av.8 °F (36.6 °C), Min:97 °F (36.1 °C), Max:98.8 °F (37.1 °C)    No intake/output data recorded.  1901 -  0700  In: 800 [P.O.:800]  Out: 1050 [Urine:1050]    General:  Alert, cooperative, no distress, appears stated age. Lungs:   Clear to auscultation bilaterally. Chest wall:  No tenderness or deformity. Heart:  Regular rate and rhythm, S1, S2 normal, no murmur, click, rub or gallop. Abdomen:   Soft, non-tender. Bowel sounds normal. No masses,  No organomegaly. Extremities: Extremities normal, atraumatic, no cyanosis or edema. Pulses: 2+ and symmetric all extremities. Skin: Skin color, texture, turgor normal. No rashes or lesions   Neurologic:  No gross sensory or motor deficits     Data Review:       Recent Days:  Recent Labs     07/10/21  0400 07/09/21  040   WBC 7.6 8.7   HGB 10.8* 12.2   HCT 35.7 40.2    237     Recent Labs     07/10/21  0400 21  0400   * 132*   K 4.2 4.6   CL 98 96   CO2 27 26    115*   BUN 47* 41*   CREA 1.20 1.40*   CA 8.3* 8.7   MG 2.0 1.9   PHOS 3.4  --      No results for input(s): PH, PCO2, PO2, HCO3, FIO2 in the last 72 hours. 24 Hour Results:  No results found for this or any previous visit (from the past 24 hour(s)).         Assessment/Plan:     Fracture of other part of scapula, left shoulder  -Patient status post fall out of wheelchair  -Right shoulder x-ray impacted, nondisplaced/nonangulated proximal left humerus fracture with dominant  involvement of the surgical neck. -Pain management  -Orthopedics consulted- no surgical intervention, sling, follow up in 2 weeks  -Case management consult for home health versus SNF  -PT consult     CKD (chronic kidney disease) stage 3, GFR 30-59 ml/min (HCC)  -1.4, stable when compared compared to 2 weeks ago  -Renal dose medications  -Continue to monitor BMP (BUN and creatinine) and electrolytes closely     Bipolar I disorder, current episode depressed ([facility] [address]  -Stable   -continue BuSpar and Depakote     Atrial fibrillation with rapid ventricular response (HCC)  -Continue Eliquis and amiodarone  -Cardiac monitoring     Phantom limb pain (HCC)  -Continue Neurontin 300 mg 3 times a day     Essential (primary) hypertension  Chronic/controlled  -Continue Lasix  -Monitor BMP closely  -Monitor blood pressure closely     Class 2 obesity in adult  -BMI: 38.4  -adjusting lifestyle modification education     Mild Hyponatremia  - resume lasix, pain control, repeat in am vs next week at faciltiy if leaves      Care Plan discussed with: Patient/Family, Case management    Total time spent with patient: 30 minutes. With greater than 50% spent in coordination of care and counseling.     Karin Larsen MD

## 2021-07-11 NOTE — PROGRESS NOTES
Received care of pt resting in bed with no distress. Pt requesting fan on table,  Offered again to move patient to a different room, patient declines at this time. Call bell within reach.

## 2021-07-11 NOTE — PROGRESS NOTES
VSS. Sock placed on left foot per pt request. Lights dimmed. No further needs voiced at this time.  CBWR

## 2021-07-11 NOTE — PROGRESS NOTES
Complete bed bath given with basin of soap and water. Incontinence care provided. New PureWick, tubing, and suction cannister in place. New gown, linens, and incontinence brief applied. Patient resting comfortably. No further needs at this time. CBWR.

## 2021-07-11 NOTE — PROGRESS NOTES
VSS. Assessment complete. Patient alert and oriented. Cream applied to patient's neck per patient request to soothe itching. Patient request for pain medication to be brought with night time medications. No further needs at this time. CBWR.

## 2021-07-11 NOTE — PROGRESS NOTES
Comprehensive Nutrition Assessment    Type and Reason for Visit: Initial    Nutrition Recommendations/Plan: cardiac restricted diet    Nutrition Assessment:  78 yo female PMH: HTN, A-fib, hypothyroidism, Bipolar, CHf, obesity   78 yo female morbidly obese BMI 40.5 fell out of wheel chair at home causing left humerus fracture. surgery reports no surgery is required but requires to be in a sling and follow up in 2 weeks. Pt is currently Pending SNF placement  Pt educated on wt loss but pt is not ready for lifestyle changes immediately reporting she will not exercise and she will not change her diet to lose weight. Provided pt with handout via nutrition care manual for wt loss tips and cooking tips placed handouts next to pt cookies she had at bedside. BMP: No results found for: NA, K, CL, CO2, AGAP, GLU, BUN, CREA, GFRAA, GFRNA       Malnutrition Assessment:  Malnutrition Status:  No malnutrition    Context:  Acute illness     Findings of the 6 clinical characteristics of malnutrition:   Energy Intake:  No significant decrease in energy intake  Weight Loss:  No significant weight loss     Body Fat Loss:  No significant body fat loss,     Muscle Mass Loss:  No significant muscle mass loss,    Fluid Accumulation:  No significant fluid accumulation,     Strength:  Normal  strength         Estimated Daily Nutrient Needs:  Energy (kcal): 2268-7742 kcal/day; Weight Used for Energy Requirements: Adjusted (81 kg)  Protein (g): 65-81 g/day; Weight Used for Protein Requirements: Adjusted (0.8-1 g/kg)  Fluid (ml/day): 9906-7740 mL/day; Method Used for Fluid Requirements: 1 ml/kcal      Nutrition Related Findings:  78 yo female morbidly obese BMI 40.5 fell out of wheel chair at home causing left humerus fracture. surgery reports no surgery is required but requires to be in a sling and follow up in 2 weeks.  Pt is currently Pending SNF placement      Wounds:    None       Current Nutrition Therapies:  ADULT DIET Regular; Low Fat/Low Chol/High Fiber/2 gm Na;  Low Sodium (2 gm)    Anthropometric Measures:  · Height:  5' 9\" (175.3 cm)  · Current Body Wt:  124.3 kg (274 lb)   · Admission Body Wt:  274 lb    · Usual Body Wt:        · Ideal Body Wt:  145 lbs:  189 %   · Adjusted Body Weight:   ; Weight Adjustment for:  (177 lbs or 81 kg)   · Adjusted BMI:       · BMI Category:  Obese class 3 (BMI 40.0 or greater)       Nutrition Diagnosis:   · Overweight/obesity, Not ready for diet/lifestyle change related to excessive energy intake as evidenced by BMI      Nutrition Interventions:   Food and/or Nutrient Delivery: Continue current diet  Nutrition Education and Counseling: Education initiated, Education completed  Coordination of Nutrition Care: Continue to monitor while inpatient    Goals:  Pt to eat > 75% of meals, BM q 1-3 days, gradual wt loss to meet BMI < 30       Nutrition Monitoring and Evaluation:   Behavioral-Environmental Outcomes: Beliefs and attitudes, Readiness for change  Food/Nutrient Intake Outcomes: Food and nutrient intake  Physical Signs/Symptoms Outcomes: Meal time behavior, Weight     F/U: 7/18/2021    Discharge Planning:    Continue current diet     Electronically signed by Delia Saucedo on 7/11/2021 at 10:36 AM    Contact: [phone]

## 2021-07-11 NOTE — PROGRESS NOTES
Perineal care provided for incontinence of urine and stool. Moisture barrier cream applied. New PureWick in place. CBWR.

## 2021-07-11 NOTE — PROGRESS NOTES
PRN Benadryl administered for itchy skin. Cream applied per patient request. Assessment completed. Patient continues to experience aching pain in affected shoulder. Request for PRN pain medication once available. Fresh ice water within reach. No further needs at this time. CBWR.

## 2021-07-12 NOTE — PROGRESS NOTES
conducted an initial consultation and Spiritual Assessment for Shellie Bright, who is a 77 y.o.,female. Patients Primary Language is: [place]. According to the patients EMR Gnosticist Affiliation is: [employer]. The reason the Patient came to the hospital is:   Patient Active Problem List    Diagnosis Date Noted    Shoulder fracture 07/11/2021    Fracture of other part of scapula, left shoulder, initial encounter for closed fracture 07/07/2021    Class 2 obesity in adult 07/07/2021    CKD (chronic kidney disease) stage 3, GFR 30-59 ml/min ([address] 10/08/2019    Bipolar I disorder, current episode depressed ([address] 03/10/2019    Phantom limb pain ([address] 12/30/2018    Wheelchair bound 05/24/2017    Anemia 03/21/2015    Atrial fibrillation with rapid ventricular response ([address] 05/12/2013    Bipolar I disorder ([address] 08/02/2012    Essential (primary) hypertension 08/02/2012        The  provided the following Interventions:  Initiated a relationship of care and support. Explored issues of judd, spirituality and/or Anabaptist needs while hospitalized. Listened empathically. Provided chaplaincy education. Provided information about Spiritual Care Services. Offered prayer and assurance of continued prayers on patient's behalf. Chart reviewed. The following outcomes were achieved:  Patient shared some information about their medical narrative and spiritual journey/beliefs. Patient processed feeling about current hospitalization. Patient expressed gratitude for the 's visit. Assessment:  Patient did not indicate any spiritual or Anabaptist issues which require Spiritual Care Services interventions at this time. Patient does not have any Anabaptist/cultural needs that will affect patients preferences in health care. Plan:  Chaplains will continue to follow and will provide pastoral care on an as needed or requested basis.    recommends bedside caregivers page  on duty if patient shows signs of acute spiritual or emotional distress. Per patient, still in pain, but some sense of comfort knowing she is in the hospital. Hopeful to be transferred to a rehab facility.  provided prayer of comfort and rosary to the patient.      [address] [facility]   Staff [address] [facility]   [phone]

## 2021-07-12 NOTE — PROGRESS NOTES
Problem: Falls - Risk of  Goal: *Absence of Falls  Description: Document [place] Fall Risk and appropriate interventions in the flowsheet. Outcome: Progressing Towards Goal  Note: Fall Risk Interventions:  Mobility Interventions: Bed/chair exit alarm         Medication Interventions: Bed/chair exit alarm    Elimination Interventions: Bed/chair exit alarm, Call light in reach, Toileting schedule/hourly rounds    History of Falls Interventions: Bed/chair exit alarm, Room close to nurse's station, Door open when patient unattended         Problem: Patient Education: Go to Patient Education Activity  Goal: Patient/Family Education  Outcome: Progressing Towards Goal     Problem: Pressure Injury - Risk of  Goal: *Prevention of pressure injury  Description: Document Ethan Scale and appropriate interventions in the flowsheet. Outcome: Progressing Towards Goal  Note: Pressure Injury Interventions:  Sensory Interventions: Assess changes in LOC, Check visual cues for pain, Maintain/enhance activity level, Minimize linen layers, Turn and reposition approx. every two hours (pillows and wedges if needed)    Moisture Interventions: Minimize layers    Activity Interventions: PT/OT evaluation    Mobility Interventions: Float heels, HOB 30 degrees or less, Turn and reposition approx.  every two hours(pillow and wedges)    Nutrition Interventions: Document food/fluid/supplement intake, Offer support with meals,snacks and hydration    Friction and Shear Interventions: Apply protective barrier, creams and emollients, HOB 30 degrees or less, Minimize layers                Problem: Patient Education: Go to Patient Education Activity  Goal: Patient/Family Education  Outcome: Progressing Towards Goal     Problem: Patient Education: Go to Patient Education Activity  Goal: Patient/Family Education  Outcome: Progressing Towards Goal     Problem: Nutrition Deficit  Goal: *Optimize nutritional status  Outcome: Progressing Towards Goal Problem: Patient Education: Go to Patient Education Activity  Goal: Patient/Family Education  Outcome: Progressing Towards Goal

## 2021-07-12 NOTE — PROGRESS NOTES
1455-Assumed care of patient  46- pt rang call bell and stated \"give me something for pain, please\". 1517- 2 norco given along with scheduled neurontin and buspar. Pt is complaining of left-sided body pain rated 9/10. Multiple sites of bruising noted in different healing stages. Pt would like a bath after she feels better from medication. CBWR  1620- pt asleep but woke up easily to verbal command, she rates pain 4/10 now on left side of the body. 1650 full bath give, new purewick placed. Pt tolerated well. 2 assist needed to turn patient.  CBWR

## 2021-07-12 NOTE — PROGRESS NOTES
Assumed care of pt. Pt denies any pain; only mild discomfort at this time to the left shoulder.  Pt is sitting up in bed eating evening meal.

## 2021-07-12 NOTE — PROGRESS NOTES
0700 Received patient report from Wiley Holden RN. Assumed patient care. 8069 Patient received scheduled medications. Patient tolerated well. 0930 Performed head to toe assessment. Patient tolerated well. NAD. CBWR    1018 Patient c/o pain 8/10 Patient given PRN Norco. Patient tolerated well. 1120 PRN Norco effective.

## 2021-07-12 NOTE — PROGRESS NOTES
Problem: Mobility Impaired (Adult and Pediatric)  Goal: *Acute Goals and Plan of Care (Insert Text)  Description: Physical Therapy Goals  Initiated 7/8/2021 and to be accomplished within 7 day(s)  1. Patient will move from supine to sit and sit to supine , scoot up and down, and roll side to side in bed with moderate assistance . 2.  Patient will transfer from bed to chair and chair to bed with moderate assistance  using the least restrictive device. 3.  Patient will perform sit to stand with maximal assistance. 4.  Patient will propel w/c 25' with minimal assistance. PLOF: Pt used a w/c for mobility, she was able to perform stand pivots with SBA, she had assistance as needed for ADLs from her  and had an aide to came bathe her in a tub 1x/wk      7/12/2021 1033 by Neli Espinal  Outcome: Progressing Towards Goal   PHYSICAL THERAPY TREATMENT    Patient: Wendie Carpio (59 y.o. female)  Date: 7/12/2021  Diagnosis: Severe anemia [D64.9]  Fracture of other part of scapula, left shoulder, initial encounter for closed fracture [S42.192A]  Shoulder fracture [S42.90XA] <principal problem not specified>       Precautions: Fall, NWB (L UE)    ASSESSMENT:  Pt is limited due to fear of falling. She is willing to participate with PT. She performed supine exercise followed by rolling. After a short rest break Pt was able to sit EOB. No seated LOB noted Pt was able to maintain seated balance with R UE for support. See below for treatment details. Progression toward goals: Mailing slow progress with mobility goals. [x]      Improving appropriately and progressing toward goals  []      Improving slowly and progressing toward goals  []      Not making progress toward goals and plan of care will be adjusted     PLAN:  Patient continues to benefit from skilled intervention to address the above impairments. Continue treatment per established plan of care.   Discharge Recommendations:  Skilled Nursing Facility or LTC  Further Equipment Recommendations for Discharge:  N/A     SUBJECTIVE:   Patient reported feeling dizzy. Request pain medication after session due to c/o shoulder pain. OBJECTIVE DATA SUMMARY:   Critical Behavior:  Neurologic State: Alert, Appropriate for age  Orientation Level: Appropriate for age, Oriented X4        Functional Mobility Training:  Bed Mobility:  Rolling: Minimum assistance  Supine to Sit: Moderate assistance  Sit to Supine: Moderate assistance  Scooting: Maximum assistance  Pt rolled to the right 2x with railing for assist. Pt needed assist to roll hips over. Pt request 2 person assist to sit EOB due to fear of falling only one person needed. Balance:  Sitting: Intact; With support  Sitting - Static: Fair (occasional)  Sitting - Dynamic: Poor (constant support)    Therapeutic Exercises: All LE exercise performed on LLE      EXERCISE   Sets   Reps   Active Active Assist   Passive Self ROM   Comments   Ankle Pumps 1 30  [x] [] [] []    Hip ABD 2 10 [x] [] [] []    Heel Slides 1 10 [x] [] [] []    Straight Leg Raises 1 10 [x] [] [] []    Long Arc Quads 1 20 [x] [] [] []          Pain:  Pain level pre-treatment: 3/10  Pain level post-treatment: 7/10   Pain location: left scapula   Pain Intervention(s): None needed, nursing notified      Activity Tolerance:   Please refer to the flowsheet for vital signs taken during this treatment. After treatment:   [] Patient left in no apparent distress sitting up in chair  [x] Patient left in no apparent distress in bed  [x] Call bell left within reach  [x] Nursing notified  [] Caregiver present  [] Bed alarm activated  [] SCDs applied      COMMUNICATION/EDUCATION:   [x]         Role of Physical Therapy in the acute care setting. [x]         Fall prevention education was provided and the patient/caregiver indicated understanding. [x]         Patient/family have participated as able in working toward goals and plan of care.   [] Patient/family agree to work toward stated goals and plan of care. []         Patient understands intent and goals of therapy, but is neutral about his/her participation.   []         Patient is unable to participate in stated goals/plan of care: ongoing with therapy staff.  []         Other:        Elder Narrow, PTA   Time Calculation: 33 mins

## 2021-07-12 NOTE — PROGRESS NOTES
Patient checked for incontinence. Brief clean and dry. PureWick cannister emptied of 500 ml clear yellow urine. Scheduled medication administered along with PRN Benadryl for itching skin and PRN Norco administered for arm and shoulder pain. Fresh water at bedside and within reach. Call bell within reach.

## 2021-07-12 NOTE — PROGRESS NOTES
HOSPITALIST PROGRESS NOTE  Paco Valenzuela MD, [address]         Daily Progress Note: 7/12/2021      Subjective:   Patient is alert and oriented x4. Improving ecchymoses seen on the left side of the face and forehead. No overnight chest pain, shortness of breath, hypoxia, nausea/vomiting noted. Continues to await placement to SNF for further rehab.       Medications reviewed  Current Facility-Administered Medications   Medication Dose Route Frequency    diphenhydrAMINE (BENADRYL) tablet 25 mg  25 mg Oral Q6H PRN    alum-mag hydroxide-simeth (MYLANTA) oral suspension 30 mL  30 mL Oral Q4H PRN    HYDROcodone-acetaminophen (NORCO) 5-325 mg per tablet 1 Tablet  1 Tablet Oral Q4H PRN    HYDROcodone-acetaminophen (NORCO) 5-325 mg per tablet 2 Tablet  2 Tablet Oral Q4H PRN    sodium chloride (NS) flush 5-40 mL  5-40 mL IntraVENous Q8H    sodium chloride (NS) flush 5-40 mL  5-40 mL IntraVENous PRN    acetaminophen (TYLENOL) tablet 650 mg  650 mg Oral Q6H PRN    Or    acetaminophen (TYLENOL) suppository 650 mg  650 mg Rectal Q6H PRN    polyethylene glycol (MIRALAX) packet 17 g  17 g Oral DAILY PRN    ondansetron (ZOFRAN ODT) tablet 4 mg  4 mg Oral Q8H PRN    Or    ondansetron (ZOFRAN) injection 4 mg  4 mg IntraVENous Q6H PRN    amiodarone (CORDARONE) tablet 200 mg  200 mg Oral DAILY    busPIRone (BUSPAR) tablet 15 mg  15 mg Oral TID    divalproex DR (DEPAKOTE) tablet 500 mg  500 mg Oral DAILY    apixaban (ELIQUIS) tablet 2.5 mg  2.5 mg Oral BID    furosemide (LASIX) tablet 40 mg  40 mg Oral DAILY    gabapentin (NEURONTIN) capsule 300 mg  300 mg Oral TID    levothyroxine (SYNTHROID) tablet 200 mcg  200 mcg Oral 6am    omega-3 fatty acids cap 1 Capsule (Patient Supplied)  1 Capsule Oral DAILY    therapeutic multivitamin (THERAGRAN) tablet 1 Tablet  1 Tablet Oral DAILY    cetirizine (ZYRTEC) tablet 10 mg  10 mg Oral DAILY    traZODone (DESYREL) tablet 150 mg  150 mg Oral QHS PRN       Review of Systems:   A comprehensive review of systems was negative except for that written in the HPI. Objective:   Physical Exam:     Visit Vitals  BP (!) 151/58 (BP 1 Location: Right upper arm, BP Patient Position: At rest)   Pulse 83   Temp 97 °F (36.1 °C)   Resp 18   Ht 5' 9\" (1.753 m)   Wt 123.4 kg (272 lb)   SpO2 93%   BMI 40.17 kg/m²      O2 Device: None (Room air)  Patient Vitals for the past 8 hrs:   Temp Pulse Resp BP SpO2   21 0830     93 %   21 0755 97 °F (36.1 °C) 83 18 (!) 151/58 93 %          Temp (24hrs), Av.9 °F (36.1 °C), Min:96.8 °F (36 °C), Max:97 °F (36.1 °C)    No intake/output data recorded. 07/10 1901 -  0700  In: 900 [P.O.:900]  Out: 1150 [Urine:1150]    General:  Alert, cooperative, no distress, appears stated age. Lungs:   Clear to auscultation bilaterally. Chest wall:  No tenderness or deformity. Heart:  Regular rate and rhythm, S1, S2 normal, no murmur, click, rub or gallop. Abdomen:   Soft, non-tender. Bowel sounds normal. No masses,  No organomegaly. Extremities: Extremities normal, atraumatic, no cyanosis or edema. Pulses: 2+ and symmetric all extremities. Skin: Skin color, texture, turgor normal. No rashes or lesions   Neurologic:  No gross sensory or motor deficits     Data Review:       Recent Days:  Recent Labs     07/10/21  0400   WBC 7.6   HGB 10.8*   HCT 35.7        Recent Labs     07/10/21  0400   *   K 4.2   CL 98   CO2 27      BUN 47*   CREA 1.20   CA 8.3*   MG 2.0   PHOS 3.4     No results for input(s): PH, PCO2, PO2, HCO3, FIO2 in the last 72 hours. 24 Hour Results:  No results found for this or any previous visit (from the past 24 hour(s)).         Assessment/Plan:     Fracture of other part of scapula, left shoulder  -Patient status post fall out of wheelchair  -Right shoulder x-ray impacted, nondisplaced/nonangulated proximal left humerus fracture with dominant  involvement of the surgical neck. -Pain management  -Orthopedics consulted- no surgical intervention, sling, follow up in 2 weeks  -Case management consult for home health versus SNF  -PT consult     CKD (chronic kidney disease) stage 3, GFR 30-59 ml/min (HCC)  -1.4, stable when compared compared to 2 weeks ago  -Renal dose medications  -Continue to monitor BMP (BUN and creatinine) and electrolytes closely     Bipolar I disorder, current episode depressed ([facility], [address]  -Stable   -continue BuSpar TID and Depakote     Atrial fibrillation with rapid ventricular response (HCC)  -Continue Eliquis and amiodarone  -Cardiac monitoring     Phantom limb pain (HCC)  -Continue Neurontin 300 mg 3 times a day     Essential (primary) hypertension  Chronic/controlled  -Continue Lasix  -Monitor BMP closely  -Monitor blood pressure closely     Class 2 obesity in adult  -BMI: 38.4  -adjusting lifestyle modification education      Care Plan discussed with: Patient/Family, Case management    Total time spent with patient: 30 minutes. With greater than 50% spent in coordination of care and counseling.     Sujata Tsang MD

## 2021-07-12 NOTE — PROGRESS NOTES
Placed patient on bedside commode. 2 person assist. Patient had large BM. Purewick reapplied. Repositioned. Bed in lowest position. CBWR.

## 2021-07-13 NOTE — PROGRESS NOTES
straight cath complete using sterile technique. 100 cloudy yellow urine drained and sent to lab for UA/culture. Patient tolerated well. New purwick applied.  Patient positioned for comfort and denies any needs at this time, Cb in reach

## 2021-07-13 NOTE — PROGRESS NOTES
Therapist in to work with pt, she request for therapist to help reposition her in bed and to fix sling. She c/o 7-8/10 pain in the L UE and does not want to do much more today. Will return tomorrow for further tx.   Kirstie Tatum, PT, DPT

## 2021-07-13 NOTE — PROGRESS NOTES
[facility] and [facility] in [place], [place] are full and will be unable to take pt. She has now decided she wants to go to [address].

## 2021-07-13 NOTE — PROGRESS NOTES
Received report from off going nurse via bedside shift report. Patient in bed watching. Will continue to monitor. Call bell in reach and bed in lowest position.  Left patient in bed visiting with family,

## 2021-07-13 NOTE — ROUTINE PROCESS
Received pt in bed with eyes open brushing her hair. Pt c/o no needs or assistance at this moment. Pt vitals are stable. Call bell in reach and safety measures are intact.

## 2021-07-13 NOTE — PROGRESS NOTES
HOSPITALIST PROGRESS NOTE  Nannette Henderson MD, [address]         Daily Progress Note: 7/13/2021      Subjective:   Patient is alert and oriented x4.  at bedside. Evaluated and examined in presence of nursing supervisor. Improving ecchymoses seen on the left side of the face and forehead. No overnight fever/chills, chest pain, shortness of breath, hypoxia, nausea/vomiting noted. Continues to await placement to SNF for further rehab. Complains of dysuria over the last several days and states that she has been complaining to nursing as well however this has not been reported. She further states that Keflex as an antibiotic does not work for her. Likely discharge in a.m. after authorization approved to [address].     Medications reviewed  Current Facility-Administered Medications   Medication Dose Route Frequency    nitrofurantoin (macrocrystal-monohydrate) (MACROBID) capsule 100 mg  100 mg Oral BID    diphenhydrAMINE (BENADRYL) tablet 25 mg  25 mg Oral Q6H PRN    alum-mag hydroxide-simeth (MYLANTA) oral suspension 30 mL  30 mL Oral Q4H PRN    HYDROcodone-acetaminophen (NORCO) 5-325 mg per tablet 1 Tablet  1 Tablet Oral Q4H PRN    HYDROcodone-acetaminophen (NORCO) 5-325 mg per tablet 2 Tablet  2 Tablet Oral Q4H PRN    sodium chloride (NS) flush 5-40 mL  5-40 mL IntraVENous Q8H    sodium chloride (NS) flush 5-40 mL  5-40 mL IntraVENous PRN    acetaminophen (TYLENOL) tablet 650 mg  650 mg Oral Q6H PRN    Or    acetaminophen (TYLENOL) suppository 650 mg  650 mg Rectal Q6H PRN    polyethylene glycol (MIRALAX) packet 17 g  17 g Oral DAILY PRN    ondansetron (ZOFRAN ODT) tablet 4 mg  4 mg Oral Q8H PRN    Or    ondansetron (ZOFRAN) injection 4 mg  4 mg IntraVENous Q6H PRN    amiodarone (CORDARONE) tablet 200 mg  200 mg Oral DAILY    busPIRone (BUSPAR) tablet 15 mg  15 mg Oral TID  divalproex DR (DEPAKOTE) tablet 500 mg  500 mg Oral DAILY    apixaban (ELIQUIS) tablet 2.5 mg  2.5 mg Oral BID    furosemide (LASIX) tablet 40 mg  40 mg Oral DAILY    gabapentin (NEURONTIN) capsule 300 mg  300 mg Oral TID    levothyroxine (SYNTHROID) tablet 200 mcg  200 mcg Oral 6am    omega-3 fatty acids cap 1 Capsule (Patient Supplied)  1 Capsule Oral DAILY    therapeutic multivitamin (THERAGRAN) tablet 1 Tablet  1 Tablet Oral DAILY    cetirizine (ZYRTEC) tablet 10 mg  10 mg Oral DAILY    traZODone (DESYREL) tablet 150 mg  150 mg Oral QHS PRN       Review of Systems:   A comprehensive review of systems was negative except for that written in the HPI. Objective:   Physical Exam:     Visit Vitals  BP (!) 130/42   Pulse 78   Temp 96.8 °F (36 °C)   Resp 16   Ht 5' 9\" (1.753 m)   Wt 123.4 kg (272 lb)   SpO2 96%   BMI 40.17 kg/m²      O2 Device: None (Room air)  Patient Vitals for the past 8 hrs:   Temp Pulse Resp BP SpO2   21 0737 96.8 °F (36 °C) 78 16 (!) 130/42 96 %          Temp (24hrs), Av °F (36.1 °C), Min:96.8 °F (36 °C), Max:97.1 °F (36.2 °C)    No intake/output data recorded.  1901 -  0700  In: 400 [P.O.:400]  Out: 500 [Urine:500]    General:  Alert, cooperative, no distress, appears stated age. Lungs:   Clear to auscultation bilaterally. Chest wall:  No tenderness or deformity. Heart:  Regular rate and rhythm, S1, S2 normal, no murmur, click, rub or gallop. Abdomen:   Soft, non-tender. Bowel sounds normal. No masses,  No organomegaly. No significant bilateral CVA tenderness noted. Extremities: Extremities normal, atraumatic, no cyanosis or edema. Pulses: 2+ and symmetric all extremities. Skin: Skin color, texture, turgor normal. No rashes or lesions   Neurologic:  No gross sensory or motor deficits     Data Review:       Recent Days:  No results for input(s): WBC, HGB, HCT, PLT, HGBEXT, HCTEXT, PLTEXT, HGBEXT, HCTEXT, PLTEXT in the last 72 hours.   No results for input(s): NA, K, CL, CO2, GLU, BUN, CREA, CA, MG, PHOS, ALB, TBIL, TBILI, ALT, INR, INREXT, INREXT in the last 72 hours. No lab exists for component: SGOT  No results for input(s): PH, PCO2, PO2, HCO3, FIO2 in the last 72 hours. 24 Hour Results:  Recent Results (from the past 24 hour(s))   URINALYSIS W/ REFLEX CULTURE    Collection Time: 07/13/21 11:00 AM    Specimen: Urine   Result Value Ref Range    Color Yellow      Appearance Turbid      Specific gravity 1.012 1.005 - 1.030      pH (UA) 5.0 5.0 - 8.0      Protein Trace (A) Negative mg/dL    Glucose Negative Negative mg/dL    Ketone Negative Negative mg/dL    Bilirubin Negative Negative      Blood Small (A) Negative      Urobilinogen 1.0 0.2 - 1.0 EU/dL    Nitrites Negative Negative      Leukocyte Esterase Large (A) Negative      WBC PENDING /hpf    RBC PENDING /hpf    Epithelial cells PENDING /lpf    Bacteria PENDING /hpf    UA:UC IF INDICATED PENDING            Assessment/Plan:     Fracture of other part of scapula, left shoulder  Status post fall out of wheelchair  Right shoulder x-ray impacted, nondisplaced/nonangulated proximal left humerus fracture with dominant  involvement of the surgical neck. Pain management  Orthopedics consulted- no surgical intervention, sling, follow up in 2 weeks  Case management consult for home health versus SNF    UTI  Based on clinical symptoms and presentation over the last few days with dysuria. No fever/chills, nausea/vomiting noted. Check patient's CBC and as well as obtain urinalysis and straight cath urine culture  Start empirically on Macrobid 100 mg twice daily. Patient states Keflex does not work for her UTIs and unable to use fluoroquinolone given concurrent use of amiodarone increases significant risk for QTC prolongation.     CKD stage 3,  Stable when compared compared to 2 weeks ago     Bipolar I disorder, current episode depressed (HCC)  Continue BuSpar TID and Depakote     Atrial fibrillation with rapid ventricular response (HCC)  Continue Eliquis and amiodarone  Cardiac monitoring     Phantom limb pain (HCC)  Continue Neurontin 300 mg 3 times a day     Essential (primary) hypertension  Continue Lasix     Class 2 obesity in adult  BMI: 38.4  Adjusting lifestyle modification education      Care Plan discussed with: Patient/Family, Case management, nursing. Likely discharge in a.m. after authorization approved to [address]. Total time spent with patient: 35 minutes. With greater than 50% spent in coordination of care and counseling.     Paco Valenzuela MD

## 2021-07-13 NOTE — PROGRESS NOTES
Problem: Mobility Impaired (Adult and Pediatric)  Goal: *Acute Goals and Plan of Care (Insert Text)  Description: Physical Therapy Goals  Initiated 7/8/2021 and to be accomplished within 7 day(s)  1. Patient will move from supine to sit and sit to supine , scoot up and down, and roll side to side in bed with moderate assistance . 2.  Patient will transfer from bed to chair and chair to bed with moderate assistance  using the least restrictive device. 3.  Patient will perform sit to stand with maximal assistance. 4.  Patient will propel w/c 25' with minimal assistance. PLOF: Pt used a w/c for mobility, she was able to perform stand pivots with SBA, she had assistance as needed for ADLs from her  and had an aide to came bathe her in a tub 1x/wk      Outcome: Progressing Towards Goal   PHYSICAL THERAPY TREATMENT    Patient: Reggie Win (51 y.o. female)  Date: 7/13/2021  Diagnosis: Severe anemia [D64.9]  Fracture of other part of scapula, left shoulder, initial encounter for closed fracture [S42.192A]  Shoulder fracture [S42.90XA] <principal problem not specified>       Precautions: Fall, NWB (LUE gentle ROM, can be out of sling for hygiene)    ASSESSMENT:  Pt lying in bed agreeable to some ROM for the L UE. She was able to tolerate some abduction of the L shoulder and elbow ROM. While removing LUE from sling she was noted to have a bruise/pressure ulcer from where the sling was on her L hand, and nursing was notified. She was helped with some hygiene of the L UE requiring total assist. She was then tired and was left with all needs met. Progression toward goals:   []      Improving appropriately and progressing toward goals  [x]      Improving slowly and progressing toward goals  []      Not making progress toward goals and plan of care will be adjusted     PLAN:  Patient continues to benefit from skilled intervention to address the above impairments.   Continue treatment per established plan of care. Discharge Recommendations:  [facility]  Further Equipment Recommendations for Discharge:  hospital bed     SUBJECTIVE:   Patient stated i'm wore out.     OBJECTIVE DATA SUMMARY:   Critical Behavior:  Neurologic State: Alert, Appropriate for age, Drowsy  Orientation Level: Oriented X4  Functional Mobility Training:   Range Of Motion:   PROM: Generally decreased, functional (Elbow extension limited due to pain and muscle length)    Pain:  Pain level pre-treatment: 5/10  Pain level post-treatment: 5/10   Pain Location: L shoulder with movement  Pain Intervention(s): Medication (see MAR); Rest, Ice, Repositioning   Response to intervention: Nurse notified, See doc flow    Activity Tolerance:   Poor  Please refer to the flowsheet for vital signs taken during this treatment. After treatment:   [] Patient left in no apparent distress sitting up in chair  [x] Patient left in no apparent distress in bed  [x] Call bell left within reach  [x] Nursing notified  [] Caregiver present  [] Bed alarm activated  [] SCDs applied      COMMUNICATION/EDUCATION:   [x]         Role of Physical Therapy in the acute care setting. []         Fall prevention education was provided and the patient/caregiver indicated understanding. [x]         Patient/family have participated as able in working toward goals and plan of care. [x]         Patient/family agree to work toward stated goals and plan of care. []         Patient understands intent and goals of therapy, but is neutral about his/her participation.   []         Patient is unable to participate in stated goals/plan of care: ongoing with therapy staff.  []         Other:        Michael Quintana PT, DPT   Time Calculation: 26 mins

## 2021-07-13 NOTE — PROGRESS NOTES
Problem: Falls - Risk of  Goal: *Absence of Falls  Description: Document Marva Fisher Fall Risk and appropriate interventions in the flowsheet. Outcome: Progressing Towards Goal  Note: Fall Risk Interventions:  Mobility Interventions: Bed/chair exit alarm         Medication Interventions: Bed/chair exit alarm    Elimination Interventions: Bed/chair exit alarm    History of Falls Interventions: Bed/chair exit alarm         Problem: Patient Education: Go to Patient Education Activity  Goal: Patient/Family Education  Outcome: Progressing Towards Goal     Problem: Pressure Injury - Risk of  Goal: *Prevention of pressure injury  Description: Document Ethan Scale and appropriate interventions in the flowsheet.   Outcome: Progressing Towards Goal  Note: Pressure Injury Interventions:  Sensory Interventions: Assess changes in LOC, Keep linens dry and wrinkle-free, Maintain/enhance activity level, Minimize linen layers    Moisture Interventions: Absorbent underpads, Apply protective barrier, creams and emollients, Internal/External urinary devices, Minimize layers    Activity Interventions: PT/OT evaluation    Mobility Interventions: HOB 30 degrees or less, Pressure redistribution bed/mattress (bed type), PT/OT evaluation    Nutrition Interventions: Document food/fluid/supplement intake    Friction and Shear Interventions: Apply protective barrier, creams and emollients, Feet elevated on foot rest, HOB 30 degrees or less, Minimize layers

## 2021-07-13 NOTE — PROGRESS NOTES
Administered patient medications. Tolerated well. Assisted patient with mouth rinse. No other needs noted at this time. Fresh ice water given. Will continue to monitor.

## 2021-07-13 NOTE — ROUTINE PROCESS
Pt  Resting in bed with eyes open. Pt c/o no needs or assistance at this present moment. Call bell is in reach and safety measures are intact.

## 2021-07-14 NOTE — PROGRESS NOTES
Patient brief changed and patient positioned for comfort. Patient denies any other needs at this time.  CB in reach

## 2021-07-14 NOTE — PROGRESS NOTES
HS medication and prn trazodone given per pt request, pt tolerated well. Pt remains a&ox4 at this time. No further needs voiced at this time. Will continue to monitor.  CBWR

## 2021-07-14 NOTE — DISCHARGE SUMMARY
Discharge Summary       Patient ID:  Kranthi Elise,   77 y.o., female  1945    PCP:  Terrance Moeller MD    Admit Date: 7/7/2021  3:13 PM  Discharge Date:  No discharge date for patient encounter. Length of stay: 3 day(s)  Code Status: DNR  Discharging physician: Radha Judge MD    Chief Complaint   Patient presents with    Fall       Discharge Medications  Current Discharge Medication List      START taking these medications    Details   nitrofurantoin, macrocrystal-monohydrate, (MACROBID) 100 mg capsule Take 1 Capsule by mouth two (2) times a day for 7 days. Qty: 14 Capsule, Refills: 0  Start date: 7/14/2021, End date: 7/21/2021      polyethylene glycol (MIRALAX) 17 gram packet Take 1 Packet by mouth daily. Qty: 10 Packet, Refills: 0  Start date: 7/14/2021      ondansetron (ZOFRAN ODT) 4 mg disintegrating tablet Take 1 Tablet by mouth every eight (8) hours as needed for Nausea or Vomiting for up to 10 days. Qty: 30 Tablet, Refills: 0  Start date: 7/14/2021, End date: 7/24/2021         CONTINUE these medications which have CHANGED    Details   furosemide (LASIX) 40 mg tablet Take 1 Tablet by mouth daily. Indications: Please hold lasix x 3 days, then resume at 40mg daily, which is half of what you were reportedly taking prior to admission. Qty: 30 Tablet, Refills: 0  Start date: 7/14/2021    Associated Diagnoses: Essential (primary) hypertension      acetaminophen (TYLENOL) 325 mg tablet Take 2 Tablets by mouth every six (6) hours as needed for Pain. Qty: 40 Tablet, Refills: 0  Start date: 7/14/2021         CONTINUE these medications which have NOT CHANGED    Details   traZODone (DESYREL) 150 mg tablet Take 1 Tablet by mouth nightly as needed for Sleep for up to 90 days. Qty: 90 Tablet, Refills: 0    Associated Diagnoses: Insomnia, unspecified type      busPIRone (BUSPAR) 15 mg tablet Take 1 Tablet by mouth three (3) times daily for 90 days.   Qty: 270 Tablet, Refills: 0    Comments: Please review prescription with patient. Specifically, that she will take 1 tab three times daily. Associated Diagnoses: Generalized anxiety disorder      levothyroxine (SYNTHROID) 200 mcg tablet TAKE 1 TABLET EVERY DAY  Qty: 90 Tablet, Refills: 3      gabapentin (NEURONTIN) 300 mg capsule Take 1 Capsule by mouth three (3) times daily. Max Daily Amount: 900 mg. Qty: 90 Capsule, Refills: 3    Associated Diagnoses: Neuropathy      trihexyphenidyL (ARTANE) 2 mg tablet Take 1 Tablet by mouth daily for 30 days. Qty: 30 Tablet, Refills: 0    Associated Diagnoses: Tremor      amiodarone (CORDARONE) 200 mg tablet TAKE 1 TABLET EVERY DAY  Qty: 90 Tablet, Refills: 3      FeroSuL 325 mg (65 mg iron) tablet Take 1 Tab by mouth daily. divalproex DR (DEPAKOTE) 500 mg tablet Take 1 Tab by mouth daily for 90 days. Qty: 90 Tab, Refills: 0      ibuprofen (MOTRIN) 600 mg tablet Take 1 Tab by mouth every eight (8) hours as needed for Pain. Qty: 90 Tab, Refills: 3      multivitamin (MULTIPLE VITAMIN PO) Take 1 Tab by mouth daily. Eliquis 2.5 mg tablet TAKE 1 TABLET TWICE DAILY  Qty: 60 Tab, Refills: 5      fexofenadine (Allegra Allergy) 180 mg tablet Take 1 Tab by mouth daily. omega-3 fatty acids (Fish Oil Concentrate) 1,000 mg cap Take 1 Tab by mouth daily. magnesium oxide (MAG-OX) 400 mg tablet Take 1 Tablet by mouth two (2) times a day. b complex-vitamin c-folic acid 0.8 mg (Isabel-Seth) 0.8 mg tab tablet Take 1 Tab by mouth daily. cranberry extract 425 mg cap Take 425 mg by mouth two (2) times a day.       baclofen (LIORESAL) 10 mg tablet TAKE 1 TABLET THREE TIMES DAILY  Qty: 270 Tab, Refills: 2                 HPI on Admission (per admitting physician):   Dee Hernandez is a 77 y.o. elderly  female with a past medical history of hypertension, atrial fib, hypothyroidism, bipolar, congestive heart failure, and obesity, patient presents to the ED with a chief complaint of left arm pain status post a fall from her wheelchair. Patient states she was at home in her wheelchair trying to get something from out of the cabinet, patient lost balance while in the wheelchair and fell to her left side injuring her left shoulder and hitting her face on the floor. At this time patient denies any dizziness, headaches, shortness of breath, and chest pain.     In the ED H&H 10.8 and 35.1, UA ordered, BUN 47 creatinine 1.80, sodium 134, CT of the head no evidence of acute intracranial abnormality did show mild soft tissue swelling about the periorbital and facial regions, CT of the maxillofacial shown mild soft tissue swelling without evidence of acute maxillofacial fracture, CT of the spine showed postop changes as described without evidence of fracture or acute traumatic listhesis involving the cervical spine, it did show dilated main pulmonary artery as it can be seen in pulmonary hypertension, left shoulder x-ray shown impacted, nondisplaced/nonangulated proximal left humerus fracture with dominant involvement of the surgical neck. In the ED patient received 100 mcg of fentanyl via IV and 4 mg of IV Zofran. Hospital Course: The patient admitted for the following Principal Medical Problem:   Fracture of other part of scapula, left shoulder  - Patient status post fall out of wheelchair  - Right shoulder x-ray impacted, nondisplaced/nonangulated proximal left humerus fracture with dominant involvement of the surgical neck.   - Orthopedics consulted- no surgical intervention, sling, follow up in 2 weeks  - D/w  and the  plan to d/c to [facility] [place] still pending   - Continue with PT and pain control   CKD (chronic kidney disease) stage 3, GFR 30-59 ml/min ([facility])  -Stable and Improving   - Avoid nephrotoxin and dehydration   Acute Cystitis without hematuria   - Afebrile , normal WBC, symptoms improved   - continue with empirical Macrobid course and good hydration , follow up on culture :     Other Active Problems managed during this hospitalization:  Bipolar I disorder - Stable on BuSpar and Depakote  Atrial fibrillation with RVR rate controlled on Eliquis and amiodarone  Phantom limb pain on Neurontin 300 mg 3 times a day  Essential (primary) hypertension - Chronic/controlled  Class 2 obesity in adult BMI: 38.4, Counseled   Code Status: DNR      The patient condition became clinically stable and No new complain or complication during the hospital course. The Medication changes discussed with the patient and family on the discharge    Condition at discharge   Afebrile  Ambulating  Eating, Drinking, Voiding  Stable      Discharge Instructions: Follow up with Orthopedic in 2 weeks       Physical Exam on Discharge:  Visit Vitals  BP (!) 128/53 (BP 1 Location: Left upper arm, BP Patient Position: At rest)   Pulse 68   Temp 97 °F (36.1 °C)   Resp 17   Ht 5' 9\" (1.753 m)   Wt 123.3 kg (271 lb 12.8 oz)   SpO2 96%   BMI 40.14 kg/m²       General Appearance:   Appears in no acute distress. , Sitting up.,   Skin:   Skin warm & dry, No rash, No jaundice,   Lymph: There is no lymphadenopathy,   HEENT:   PERRLA, EOMI, Moist oral mucous membranes, conjunctiva clear,   Neck:   Supple, Without masses,   Lungs:   Clear, No wheezes. , No rales. , Normal respiratory effort,   Heart:   Regular rate and rhythm, No gallop,   Abdomen:   Soft , Non-distended, Normal bowel sounds and Non-tender,   Extremities:  no edema of legs, Normal pedal and radial pulses,   Neuro:    alert, oriented, affect appropriate, speech fluent, cranial nerves intact, no focal neurological deficits and moves all extremities well    Operative Procedures:    None.     Consultants/Treatment Team:    Treatment Team: Attending Provider: Honey Hernandez MD; Consulting Provider: Jay Phillips MD; Utilization Review: Dalton Cotto RN; Primary Nurse: Martin Elias; Primary Nurse: Lisa Mcdaniel; Student Nurse: Hoang Shelby Neela    Disposition:    [facility] ([facility])    Most Recent Labs:  Recent Results (from the past 24 hour(s))   CBC WITH AUTOMATED DIFF    Collection Time: 07/13/21  1:50 PM   Result Value Ref Range    WBC 4.9 4.6 - 13.2 K/uL    RBC 3.22 (L) 4.20 - 5.30 M/uL    HGB 11.1 (L) 12.0 - 16.0 g/dL    HCT 36.5 35.0 - 45.0 %    .4 (H) 74.0 - 97.0 FL    MCH 34.5 (H) 24.0 - 34.0 PG    MCHC 30.4 (L) 31.0 - 37.0 g/dL    RDW 12.1 11.6 - 14.5 %    PLATELET 850 156 - 422 K/uL    MPV 9.4 9.2 - 11.8 FL    NEUTROPHILS 52 40 - 73 %    LYMPHOCYTES 31 21 - 52 %    MONOCYTES 14 (H) 3 - 10 %    EOSINOPHILS 3 0 - 5 %    BASOPHILS 0 0 - 2 %    IMMATURE GRANULOCYTES 0 %    ABS. NEUTROPHILS 2.6 1.8 - 8.0 K/UL    ABS. LYMPHOCYTES 1.5 0.9 - 3.6 K/UL    ABS. MONOCYTES 0.7 0.05 - 1.2 K/UL    ABS. EOSINOPHILS 0.1 0.0 - 0.4 K/UL    ABS. BASOPHILS 0.0 0.0 - 0.1 K/UL    ABS. IMM. GRANS. 0.0 K/UL    PLATELET COMMENTS W/MODERATE NUMBER LARGE FORMS NOTED. RBC COMMENTS Macrocytosis  2+         No results for input(s): BUN, NA, POTASSIUM, CHLORIDE, CO2 in the last 72 hours. No lab exists for component: GLUCOSE, CALCIUM, CREAT  Recent Labs     07/13/21  1350   WBC 4.9   RBC 3.22*   HCT 36.5   .4*   MCH 34.5*   MCHC 30.4*   RDW 12.1       XR Results:  Results from Hospital Encounter encounter on 07/07/21    XR CHEST PORT    Narrative  HISTORY:  -Provided with order: Cough  -Additional: None    Technique : AP PORTABLE CHEST    Comparison : 11/18/18    FINDINGS:    HEART AND MEDIASTINUM: Patient rotated to the right limiting mediastinal  assessment, with unfolding of the aorta. Otherwise grossly stable allowing for  differences in technique. LUNGS AND PLEURAL SPACES: No consolidation, congestive heart failure, pleural  effusion or pneumothorax. Stable elevation of the right diaphragm. BONY THORAX AND SOFT TISSUES: No acute osseous abnormality. Partially visualized  right humeral prosthesis.  Surgical clips in the epigastrium. Gaseous distention  of left upper quadrant abdominal bowel. Impression  No plain film evidence of acute cardiopulmonary disease, allowing for technique. CT Results:  Results from Hospital Encounter encounter on 07/07/21    CT MAXILLOFACIAL WO CONT    Narrative  Maxillofacial CT    History: Facial bruising, pain, fall, trauma    Technique: Multiple axial CT images of the maxillofacial structures including  the mandible were performed. Additional coronal and sagittal reformations were  also performed. One or more dose reduction techniques were used on this CT:  automated exposure control, adjustment of the mAs and/or kVp according to  patient size, and iterative reconstruction techniques. The specific techniques  used on this CT exam have been documented in the patient's electronic medical  record. Digital Imaging and Communications in Medicine (DICOM) format image  data are available to nonaffiliated external healthcare facilities or entities  on a secure, media free, reciprocally searchable basis with patient  authorization for at least a 12-month period after this study. Findings: The frontal bone, bilateral lateral orbital walls, zygomatic arches, and medial  orbital walls are intact. Maxillary sinuses intact. Pterygoid plates and  anterior maxillary spine appear within normal limits. Nasal bones intact. Temporomandibular joint alignment appears within normal limits. Mandible appears  intact. There is mild mucosal thickening of the anterior posterior ethmoid air cells. Evidence of prior medial antrostomy. Opacification of the sphenoid sinus. Remaining imaged paranasal sinuses and mastoid air cells appear within normal  limits. CT appearance of the orbits is within normal limits. Mild periorbital soft  tissue swelling. Impression  1. Mild soft tissue swelling without evidence of acute maxillofacial fracture.   2. Mild and nonspecific paranasal mucosal thickening involving the ethmoid air  cells and sphenoid sinus. MRI Results:  No results found for this or any previous visit. Nuclear Medicine Results:  No results found for this or any previous visit.         Rachel Reyes MD   Hospitalist

## 2021-07-14 NOTE — PROGRESS NOTES
OCCUPATIONAL THERAPY EVALUATION    Patient: Manjula Shankar (7 y.o. female)  Date: 7/14/2021  Primary Diagnosis: Severe anemia [D64.9]  Fracture of other part of scapula, left shoulder, initial encounter for closed fracture [S42.192A]  Shoulder fracture [S42.90XA]       Precautions:  Fall, NWB (LUE gentle ROM, can be out of sling for hygiene)  PLOF: Lives at home with . Mostly I with basic ADLs, mostly w/c level for mobility     ASSESSMENT :  Based on the objective data described below, the patient presents with declines in ADLs and mobility. Patient will benefit from skilled intervention to address the above impairments. Patient's rehabilitation potential is considered to be Good  Factors which may influence rehabilitation potential include:   []             None noted  []             Mental ability/status  [x]             Medical condition  [x]             Home/family situation and support systems  [x]             Safety awareness  [x]             Pain tolerance/management  []             Other:      PLAN :  Recommendations and Planned Interventions:  [x]               Self Care Training                  [x]      Therapeutic Activities  [x]               Functional Mobility Training   []      Cognitive Retraining  [x]               Therapeutic Exercises           []      Endurance Activities  []               Balance Training                    []      Neuromuscular Re-Education  []               Visual/Perceptual Training     [x]      Home Safety Training  [x]               Patient Education                   []      Family Training/Education  []               Other (comment):    Frequency/Duration: Patient will be followed by occupational therapy 5 times a week to address goals. Discharge Recommendations: [facility]  Further Equipment Recommendations for Discharge: wheelchair 18 inch and N/A     SUBJECTIVE:   Patient stated Im supposed to go to rehab later today.     OBJECTIVE DATA SUMMARY:     Past Medical History:   Diagnosis Date    B[facility] disorder[facility] depressed [facility] [address]     CHF (congestive heart failure) ([facility])     Chronic neck and back pain     Frequent UTI     Hypercholesteremia     Hypertension     Obese     Psychiatric disorder      Past Surgical History:   Procedure Laterality Date    HX ABOVE KNEE AMPUTATION      right    HX CERVICAL FUSION      HX CHOLECYSTECTOMY      HX GASTRIC BYPASS      HX HYSTERECTOMY      HX ORTHOPAEDIC      neck surgery    HX PARTIAL THYROIDECTOMY       Barriers to Learning/Limitations: None  Compensate with: visual, verbal, tactile, kinesthetic cues/model    Home Situation:   Home Situation  Home Environment: Private residence  Wheelchair Ramp: Yes  One/Two Story Residence: Two story, live on 1st floor  Living Alone: No  Support Systems: Family member(s)  Patient Expects to be Discharged to[de-identified] Skilled nursing facility  Current DME Used/Available at Home: Wheelchair  [x]  Right hand dominant   []  Left hand dominant    Cognitive/Behavioral Status:  Neurologic State: Alert (periodic confusion)  Orientation Level: Oriented X4          Skin: L arm sling   Edema: gross edema noted to entire L UE   Vision/Perceptual:       WFLs                               Coordination: BUE   unable to use L UE. NWB orders with L arm in sling             Balance:   poor    Strength: BUE  R UE 3+/5  L UE NT secondary to MD orders                    Tone & Sensation: BUE  R UE intact  L UE NT                             Range of Motion: BUE  R UE AROM WFLs    L UE NT- increased edema in entire L UE                             Functional Mobility and Transfers for ADLs:  Bed Mobility:  Rolling: Moderate assistance  Supine to Sit:  (Pt declined)        Transfers:   unable at present time                                     ADL Assessment:     Extensive A with all ADLs and mobility.  L UE presently in sling with NWB orders             ADL Intervention:   Will benefit from skilled OT to fernanda DIOP with basic ADLs and mobility              Pain:  Pain level pre-treatment: 8-9/10   Pain level post-treatment: 8-9/10   Pain Intervention(s): Medication (see MAR); Rest, Ice, Repositioning  Response to intervention: Nurse notified, See doc flow    Activity Tolerance:   Fair     Please refer to the flowsheet for vital signs taken during this treatment. After treatment:   [x] Patient left in no apparent distress sitting up in chair  [] Patient left in no apparent distress in bed  [x] Call bell left within reach  [x] Nursing notified  [] Caregiver present  [] Bed alarm activated    COMMUNICATION/EDUCATION:   [x] Role of Occupational Therapy in the acute care setting  [x] Home safety education was provided and the patient/caregiver indicated understanding. [x] Patient/family have participated as able in goal setting and plan of care. [] Patient/family agree to work toward stated goals and plan of care. [] Patient understands intent and goals of therapy, but is neutral about his/her participation. [] Patient is unable to participate in goal setting and plan of care. Thank you for this referral.  Shira Cope MS, OTR/L        Сергей Complexity: History: MEDIUM Complexity : Expanded review of history including physical, cognitive and psychosocial  history ; Examination: MEDIUM Complexity : 3-5 performance deficits relating to physical, cognitive , or psychosocial skils that result in activity limitations and / or participation restrictions; Decision Making:MEDIUM Complexity : Patient may present with comorbidities that affect occupational performnce.  Miniml to moderate modification of tasks or assistance (eg, physical or verbal ) with assesment(s) is necessary to enable patient to complete evaluation

## 2021-07-14 NOTE — PROGRESS NOTES
[facility] will be picking patient up today at 2 PM to take her to [address] [phone]. Pt, pt's family, nurse and facility aware.

## 2021-07-14 NOTE — ROUTINE PROCESS
Pt in bed with eyes open. Vitals are stable. Pt needed no assistance at this present moment. Call bell in reach and safety measures are intact.

## 2021-07-14 NOTE — PROGRESS NOTES
Problem: Mobility Impaired (Adult and Pediatric)  Goal: *Acute Goals and Plan of Care (Insert Text)  Description: Physical Therapy Goals  Initiated 7/8/2021 and to be accomplished within 7 day(s)  1. Patient will move from supine to sit and sit to supine , scoot up and down, and roll side to side in bed with moderate assistance . 2.  Patient will transfer from bed to chair and chair to bed with moderate assistance  using the least restrictive device. 3.  Patient will perform sit to stand with maximal assistance. 4.  Patient will propel w/c 25' with minimal assistance. PLOF: Pt used a w/c for mobility, she was able to perform stand pivots with SBA, she had assistance as needed for ADLs from her  and had an aide to came bathe her in a tub 1x/wk      Outcome: Progressing Towards Goal   PHYSICAL THERAPY TREATMENT    Patient: Bakari Morataya (87 y.o. female)  Date: 7/14/2021  Diagnosis: Severe anemia [D64.9]  Fracture of other part of scapula, left shoulder, initial encounter for closed fracture [S42.192A]  Shoulder fracture [S42.90XA] <principal problem not specified>       Precautions: Fall, NWB (LUE gentle ROM, can be out of sling for hygiene)    ASSESSMENT:  Pt seams confused upon entry stated \"are there bugs flying around\". Pt agrees to PT however is fearful of falling. Worked on L UE PROM and AROM of hand, wrist, and elbow attempted PROM with shoulder however Pt declined. Repositioned sling and arm placement. After this Pt performed supine LE exercise. Attempted to work on sup <> sit however Pt declined sitting on EOB. She did agrees to roll towards to Right. See below for treatment details.     Progression toward goals:   [x]      Improving appropriately and progressing toward goals  []      Improving slowly and progressing toward goals  []      Not making progress toward goals and plan of care will be adjusted     PLAN:  Patient continues to benefit from skilled intervention to address the above impairments. Continue treatment per established plan of care. Discharge Recommendations:  [facility]  Further Equipment Recommendations for Discharge:  N/A     SUBJECTIVE:   Patient Stated \"I will fall if I sit up\"    OBJECTIVE DATA SUMMARY:   Critical Behavior:  Neurologic State: Alert (periodic confusion)  Orientation Level: Oriented X4        Functional Mobility Training:  Bed Mobility:  Rolling: Moderate assistance  Supine to Sit:  (Pt declined)    Therapeutic Exercises:         EXERCISE   Sets   Reps   Active Active Assist   Passive Self ROM   Comments   Ankle Pumps 1 30  [x] [] [] []    Quad Sets 1 10  [x] [] [] [] Hold for 5 secs   Hip ABD 2 10 [] [] [] []    Heel Slides 2 10 [] [] [] []          Pain:  Pain level pre-treatment: 8/10  Pain level post-treatment: 8/10   Pain location: L shoulder   Pain Intervention(s): None needed, nursing notified      Activity Tolerance:   Please refer to the flowsheet for vital signs taken during this treatment. After treatment:   [] Patient left in no apparent distress sitting up in chair  [x] Patient left in no apparent distress in bed  [x] Call bell left within reach  [x] Nursing notified  [] Caregiver present  [] Bed alarm activated  [] SCDs applied      COMMUNICATION/EDUCATION:   [x]         Role of Physical Therapy in the acute care setting. [x]         Fall prevention education was provided and the patient/caregiver indicated understanding. [x]         Patient/family have participated as able in working toward goals and plan of care. []         Patient/family agree to work toward stated goals and plan of care. []         Patient understands intent and goals of therapy, but is neutral about his/her participation.   []         Patient is unable to participate in stated goals/plan of care: ongoing with therapy staff.  []         Other:        Crystal Hayward PTA   Time Calculation: 30 mins

## 2021-07-14 NOTE — PROGRESS NOTES
Problem: Falls - Risk of  Goal: *Absence of Falls  Description: Document Penny Chata Fall Risk and appropriate interventions in the flowsheet. Outcome: Progressing Towards Goal  Note: Fall Risk Interventions:  Mobility Interventions: Communicate number of staff needed for ambulation/transfer    Mentation Interventions: Adequate sleep, hydration, pain control, Bed/chair exit alarm, Door open when patient unattended, Reorient patient, Room close to nurse's station    Medication Interventions: Bed/chair exit alarm    Elimination Interventions: Bed/chair exit alarm, Call light in reach, Toileting schedule/hourly rounds    History of Falls Interventions: Bed/chair exit alarm, Door open when patient unattended         Problem: Patient Education: Go to Patient Education Activity  Goal: Patient/Family Education  Outcome: Progressing Towards Goal     Problem: Pressure Injury - Risk of  Goal: *Prevention of pressure injury  Description: Document Ethan Scale and appropriate interventions in the flowsheet.   Outcome: Progressing Towards Goal  Note: Pressure Injury Interventions:  Sensory Interventions: Assess changes in LOC, Minimize linen layers    Moisture Interventions: Absorbent underpads, Apply protective barrier, creams and emollients, Internal/External urinary devices, Minimize layers, Moisture barrier    Activity Interventions: PT/OT evaluation    Mobility Interventions: Float heels, HOB 30 degrees or less, PT/OT evaluation    Nutrition Interventions: Document food/fluid/supplement intake    Friction and Shear Interventions: Apply protective barrier, creams and emollients, HOB 30 degrees or less, Minimize layers                Problem: Patient Education: Go to Patient Education Activity  Goal: Patient/Family Education  Outcome: Progressing Towards Goal

## 2021-07-14 NOTE — PROGRESS NOTES
Pt yelling out for help, when this nurse walked into the room pt was very upset stating her  had dropped her off and left her and no one would stay in the room with her. Reoriented pt to time and place, pt knows she is in the hospital and that she has a fractured scapula, however is instant that someone go look for her . Stayed and talked with pt until she calmed down. Pt agreeable to try and get some rest. Pt took sips of water while this nurse was in room. Will continue to monitor.  CBWR

## 2021-07-14 NOTE — PROGRESS NOTES
Scheduled medication given, pt tolerated well. Cleaned pt of incontinent episode of urine. Pt is able to recall place (city and name of hospital), why she is here and date. Will continue to monitor.  CBWR

## 2021-07-14 NOTE — PROGRESS NOTES
Pt had called her  with cell phone.  called main desk while this nurse in another room. When this nurse attempted to return call x2 it went straight to voicemail.

## 2021-07-14 NOTE — PROGRESS NOTES
Pt's granddaughter called the nursing station concern as pt had called her very confused. Checked on pt, pt is a&ox4 at this time and requested ice water, given per request. Pt states she has been having weird dreams all night, pt has been awake every time this nurse has walked into the room. Pt denies any further needs at this time. Will continue to monitor.  CBWR

## 2021-07-14 NOTE — PROGRESS NOTES
PRN norco given for 7/10 left shoulder pain. Assisted pt with bedpan x2 staff, pt continent of large soft stool. Maryann care and clean pure wick and brief provided. Pure wick canister emptied. Pt drank 8 oz water. No further needs voiced at this time.  CBWR

## 2021-07-14 NOTE — PROGRESS NOTES
Patient discharged to [facility]. All belongings with patient.

## 2021-07-14 NOTE — PROGRESS NOTES
Assessment completed. Assisted pt onto bedpan per pt request, pt was unable to have a BM at this time. Positioned in bed for comfort.  CBWR Bed alarm on

## 2021-07-15 NOTE — PROGRESS NOTES
Transition of care outreach postponed for 14 days due to patient's discharge to SNF. Patient admitted to [facility].

## 2021-07-27 NOTE — PATIENT INSTRUCTIONS
Shoulder Pain: Care Instructions  Your Care Instructions     You can hurt your shoulder by using it too much during an activity, such as fishing or baseball. It can also happen as part of the everyday wear and tear of getting older. Shoulder injuries can be slow to heal, but your shoulder should get better with time. Your doctor may recommend a sling to rest your shoulder. If you have injured your shoulder, you may need testing and treatment. Follow-up care is a key part of your treatment and safety. Be sure to make and go to all appointments, and call your doctor if you are having problems. It's also a good idea to know your test results and keep a list of the medicines you take. How can you care for yourself at home? · Take pain medicines exactly as directed. ? If the doctor gave you a prescription medicine for pain, take it as prescribed. ? If you are not taking a prescription pain medicine, ask your doctor if you can take an over-the-counter medicine. ? Do not take two or more pain medicines at the same time unless the doctor told you to. Many pain medicines contain acetaminophen, which is Tylenol. Too much acetaminophen (Tylenol) can be harmful. · If your doctor recommends that you wear a sling, use it as directed. Do not take it off before your doctor tells you to. · Put ice or a cold pack on the sore area for 10 to 20 minutes at a time. Put a thin cloth between the ice and your skin. · If there is no swelling, you can put moist heat, a heating pad, or a warm cloth on your shoulder. Some doctors suggest alternating between hot and cold. · Rest your shoulder for a few days. If your doctor recommends it, you can then begin gentle exercise of the shoulder, but do not lift anything heavy. When should you call for help? Call 911 anytime you think you may need emergency care. For example, call if:    · You have chest pain or pressure. This may occur with:  ? Sweating. ?  Shortness of breath. ? Nausea or vomiting. ? Pain that spreads from the chest to the neck, jaw, or one or both shoulders or arms. ? Dizziness or lightheadedness. ? A fast or uneven pulse. After calling 911, chew 1 adult-strength aspirin. Wait for an ambulance. Do not try to drive yourself.     · Your arm or hand is cool or pale or changes color. Call your doctor now or seek immediate medical care if:    · You have signs of infection, such as:  ? Increased pain, swelling, warmth, or redness in your shoulder. ? Red streaks leading from a place on your shoulder. ? Pus draining from an area of your shoulder. ? Swollen lymph nodes in your neck, armpits, or groin. ? A fever. Watch closely for changes in your health, and be sure to contact your doctor if:    · You cannot use your shoulder.     · Your shoulder does not get better as expected. Where can you learn more? Go to http://www.escalante.com/  Enter H996 in the search box to learn more about \"Shoulder Pain: Care Instructions. \"  Current as of: November 16, 2020               Content Version: 12.8  © 0824-6425 [employer]. Care instructions adapted under license by [employer] (which disclaims liability or warranty for this information). If you have questions about a medical condition or this instruction, always ask your healthcare professional. [address] any warranty or liability for your use of this information.

## 2021-07-27 NOTE — PROGRESS NOTES
Name: Vito Bonilla    : [birth year]     Service Dept: [facility]    Patient's Pharmacies:    [facility]/pharmacy #[id number] - Maty Guerrero  [address] [place] [id number]  Phone: [phone] Fax: [phone]    [facility] - [address] [address]  Phone: [phone] Fax: [phone]       Chief Complaint   Patient presents with    Shoulder Pain        There were no vitals taken for this visit. Allergies   Allergen Reactions    Succinylcholine Chloride Unknown (comments)     Other reaction(s): Other (See Comments)  Chest pain radiating into neck    Codeine Unknown (comments)     Other reaction(s): Other (See Comments)  Abdominal pain unless \"synthetic Codeine\"    Hydromorphone Unknown (comments)     Patient states it makes her not stop talking, jittery      Hydromorphone (Bulk) Other (comments)     Makes crazy      Prednisone Unknown (comments)     Other reaction(s): Other (See Comments)    \"It turns me into a witch,makes me mean\"    Topiramate Other (comments)      Current Outpatient Medications   Medication Sig Dispense Refill    traZODone (DESYREL) 150 mg tablet Take 1 Tablet by mouth nightly as needed for Sleep for up to 90 days. 90 Tablet 0    trihexyphenidyL (ARTANE) 2 mg tablet TAKE 1 TABLET BY MOUTH EVERY DAY 30 Tablet 0    furosemide (LASIX) 40 mg tablet Take 1 Tablet by mouth daily. Indications: Please hold lasix x 3 days, then resume at 40mg daily, which is half of what you were reportedly taking prior to admission. 30 Tablet 0    polyethylene glycol (MIRALAX) 17 gram packet Take 1 Packet by mouth daily. 10 Packet 0    acetaminophen (TYLENOL) 325 mg tablet Take 2 Tablets by mouth every six (6) hours as needed for Pain.  40 Tablet 0    traZODone (DESYREL) 150 mg tablet Take 1 Tablet by mouth nightly as needed for Sleep for up to 90 days. 90 Tablet 0    busPIRone (BUSPAR) 15 mg tablet Take 1 Tablet by mouth three (3) times daily for 90 days. 270 Tablet 0    levothyroxine (SYNTHROID) 200 mcg tablet TAKE 1 TABLET EVERY DAY 90 Tablet 3    gabapentin (NEURONTIN) 300 mg capsule Take 1 Capsule by mouth three (3) times daily. Max Daily Amount: 900 mg. 90 Capsule 3    amiodarone (CORDARONE) 200 mg tablet TAKE 1 TABLET EVERY DAY 90 Tablet 3    FeroSuL 325 mg (65 mg iron) tablet Take 1 Tab by mouth daily.  divalproex DR (DEPAKOTE) 500 mg tablet Take 1 Tab by mouth daily for 90 days. (Patient taking differently: Take 500 mg by mouth nightly.) 90 Tab 0    ibuprofen (MOTRIN) 600 mg tablet Take 1 Tab by mouth every eight (8) hours as needed for Pain. 90 Tab 3    multivitamin (MULTIPLE VITAMIN PO) Take 1 Tab by mouth daily.  Eliquis 2.5 mg tablet TAKE 1 TABLET TWICE DAILY 60 Tab 5    fexofenadine (Allegra Allergy) 180 mg tablet Take 1 Tab by mouth daily.  omega-3 fatty acids (Fish Oil Concentrate) 1,000 mg cap Take 1 Tab by mouth daily.  magnesium oxide (MAG-OX) 400 mg tablet Take 1 Tablet by mouth two (2) times a day.  b complex-vitamin c-folic acid 0.8 mg (Isabel-Seth) 0.8 mg tab tablet Take 1 Tab by mouth daily.  cranberry extract 425 mg cap Take 425 mg by mouth two (2) times a day.       baclofen (LIORESAL) 10 mg tablet TAKE 1 TABLET THREE TIMES DAILY 270 Tab 2      Patient Active Problem List   Diagnosis Code    Bipolar I disorder, current episode depressed ([facility]) F31.9    CKD (chronic kidney disease) stage 3, GFR 30-59 ml/min ([facility]) N18.30    Anemia D64.9    Atrial fibrillation with rapid ventricular response ([facility]) I48.91    Phantom limb pain ([facility]) G54.6    Bipolar I disorder ([facility] [address] F31.9    Essential (primary) hypertension I10    Wheelchair bound Z99.3    Fracture of other part of scapula, left shoulder, initial encounter for closed fracture S42.192A    Class 2 obesity in adult E66.9    Shoulder fracture S42.90XA      History reviewed. No pertinent family history. Social History     Socioeconomic History    Marital status:      Spouse name: Not on file    Number of children: 3    Years of education: Not on file    Highest education level: Some college, no degree   Tobacco Use    Smoking status: Never Smoker    Smokeless tobacco: Never Used   Vaping Use    Vaping Use: Never used   Substance and Sexual Activity    Alcohol use: Not Currently     Comment: rare    Drug use: Never    Sexual activity: Yes     Social Determinants of Health     Financial Resource Strain: Low Risk     Difficulty of Paying Living Expenses: Not hard at all   Food Insecurity: No Food Insecurity    Worried About Running Out of Food in the Last Year: Never true    Colten of Food in the Last Year: Never true   Transportation Needs: No Transportation Needs    Lack of Transportation (Medical): No    Lack of Transportation (Non-Medical):  No   Physical Activity:     Days of Exercise per Week:     Minutes of Exercise per Session:    Stress:     Feeling of Stress :    Social Connections:     Frequency of Communication with Friends and Family:     Frequency of Social Gatherings with Friends and Family:     Attends Zoroastrianism Services:     Active Member of Clubs or Organizations:     Attends Club or Organization Meetings:     Marital Status:       Past Surgical History:   Procedure Laterality Date    HX ABOVE KNEE AMPUTATION      right    HX CERVICAL FUSION      HX CHOLECYSTECTOMY      HX GASTRIC BYPASS      HX HYSTERECTOMY      HX ORTHOPAEDIC      neck surgery    HX PARTIAL THYROIDECTOMY        Past Medical History:   Diagnosis Date    Bipolar 1 disorder, depressed ([address]     CHF (congestive heart failure) (HCC)     Chronic neck and back pain     Frequent UTI     Hypercholesteremia     Hypertension     Obese     Psychiatric disorder         I have reviewed and agree with [facility] and ROS and intake form in chart and the record furthermore I have reviewed prior medical record(s) regarding this patients care during this appointment. Review of Systems:    STRETCHER    Physical Exam:  Left upper extremity - neurovascularly intact with good cap refill, decreased range of motion actively and passively secondary to pain, positive weakness, positive tenderness to palpation in the area of the fracture, minimal swelling, positive echymosis, skin is intact. Right upper extremity - grossly neurovascularly intact. Full Range of motion, No Weakness with abduction, No Point Tenderness, No skin lesion are identified, No instabilty is noted, No apprehension. No cuts or abrasions are identified. Encounter Diagnoses     ICD-10-CM ICD-9-CM   1. Acute pain of left shoulder  M25.512 719.41   2. Closed 2-part nondisplaced fracture of surgical neck of left humerus, initial encounter  S42.225A 812.01       HPI:  The patient is here with a chief complaint of left shoulder pain, sharp throbbing pain. It has been the same. Nothing has helped. Pain is 6/10. ROS:  10-point review of systems is positive for fracture and insomnia. X-rays of left shoulder are positive for two-part proximal humerus fracture in good alignment. Assessment/Plan:  Plan at this point, my nurse practitioner will see her back in 3 to 4 weeks. We will get new x-rays of left shoulder, AP and [facility]. No more sling since it has already been two weeks. Work on general range of motion. She should have no restriction in approximately two and a half months from the time of the injury and go from there. As part of continued conservative pain management options the patient was advised to utilize Tylenol or OTC NSAIDS as long as it is not medically contraindicated. Return to Office: Follow-up and Dispositions    · Return in about 4 weeks (around 8/24/2021) for with [facility] to be done at nursing facility.            Scribed by Joan Payment as dictated by Jong Mirza. Delvin Dumont MD.  Documentation True and Accepted Shakeel Dumont MD

## 2021-07-29 NOTE — TELEPHONE ENCOUNTER
She was seen 07/27/2021    Closed 2-part nondisplaced fracture of surgical neck of left humerus, initial encounter        What is her weight bearing. Can she be passive and active w/ no restrictions? OT Susan Runner called.  [phone]

## 2021-07-30 NOTE — PROGRESS NOTES
Patient continues to be admitted to SNF at [facility] with date of discharge undetermined.   Episode post poned for 7 days CTN will continue to monitor

## 2021-09-28 NOTE — PATIENT INSTRUCTIONS
Shoulder Pain: Care Instructions  Your Care Instructions     You can hurt your shoulder by using it too much during an activity, such as fishing or baseball. It can also happen as part of the everyday wear and tear of getting older. Shoulder injuries can be slow to heal, but your shoulder should get better with time. Your doctor may recommend a sling to rest your shoulder. If you have injured your shoulder, you may need testing and treatment. Follow-up care is a key part of your treatment and safety. Be sure to make and go to all appointments, and call your doctor if you are having problems. It's also a good idea to know your test results and keep a list of the medicines you take. How can you care for yourself at home? · Take pain medicines exactly as directed. ? If the doctor gave you a prescription medicine for pain, take it as prescribed. ? If you are not taking a prescription pain medicine, ask your doctor if you can take an over-the-counter medicine. ? Do not take two or more pain medicines at the same time unless the doctor told you to. Many pain medicines contain acetaminophen, which is Tylenol. Too much acetaminophen (Tylenol) can be harmful. · If your doctor recommends that you wear a sling, use it as directed. Do not take it off before your doctor tells you to. · Put ice or a cold pack on the sore area for 10 to 20 minutes at a time. Put a thin cloth between the ice and your skin. · If there is no swelling, you can put moist heat, a heating pad, or a warm cloth on your shoulder. Some doctors suggest alternating between hot and cold. · Rest your shoulder for a few days. If your doctor recommends it, you can then begin gentle exercise of the shoulder, but do not lift anything heavy. When should you call for help? Call 911 anytime you think you may need emergency care. For example, call if:    · You have chest pain or pressure. This may occur with:  ? Sweating. ?  Shortness of breath. ? Nausea or vomiting. ? Pain that spreads from the chest to the neck, jaw, or one or both shoulders or arms. ? Dizziness or lightheadedness. ? A fast or uneven pulse. After calling 911, chew 1 adult-strength aspirin. Wait for an ambulance. Do not try to drive yourself.     · Your arm or hand is cool or pale or changes color. Call your doctor now or seek immediate medical care if:    · You have signs of infection, such as:  ? Increased pain, swelling, warmth, or redness in your shoulder. ? Red streaks leading from a place on your shoulder. ? Pus draining from an area of your shoulder. ? Swollen lymph nodes in your neck, armpits, or groin. ? A fever. Watch closely for changes in your health, and be sure to contact your doctor if:    · You cannot use your shoulder.     · Your shoulder does not get better as expected. Where can you learn more? Go to http://www.escalante.com/  Enter H996 in the search box to learn more about \"Shoulder Pain: Care Instructions. \"  Current as of: July 1, 2021               Content Version: 13.0  © 1164-3080 [employer]. Care instructions adapted under license by [employer] (which disclaims liability or warranty for this information). If you have questions about a medical condition or this instruction, always ask your healthcare professional. [address] any warranty or liability for your use of this information.

## 2021-09-28 NOTE — PROGRESS NOTES
Name: Denise Botello    : [birth year]     Service Dept: [facility]    Patient's Pharmacies:    [facility]/pharmacy #[id number] - [place] Mat[place] Bustos  [address] [place] [address] [id number]  Phone: [phone] Fax: [phone]    [facility], [address] [place] [address]  Phone: [phone] Fax: [phone]       Chief Complaint   Patient presents with    Shoulder Pain        There were no vitals taken for this visit. Allergies   Allergen Reactions    Succinylcholine Chloride Unknown (comments)     Other reaction(s): Other (See Comments)  Chest pain radiating into neck    Codeine Unknown (comments)     Other reaction(s): Other (See Comments)  Abdominal pain unless \"synthetic Codeine\"    Hydromorphone Unknown (comments)     Patient states it makes her not stop talking, jittery      Hydromorphone (Bulk) Other (comments)     Makes crazy      Prednisone Unknown (comments)     Other reaction(s): Other (See Comments)    \"It turns me into a witch,makes me mean\"    Topiramate Other (comments)      Current Outpatient Medications   Medication Sig Dispense Refill    ARIPiprazole (ABILIFY) 10 mg tablet       ciprofloxacin HCl (CIPRO) 500 mg tablet       HYDROcodone-acetaminophen (NORCO)  mg tablet       HYDROcodone-acetaminophen (NORCO) 7.5-325 mg per tablet       ondansetron hcl (ZOFRAN) 4 mg tablet       pantoprazole (PROTONIX) 40 mg tablet       gabapentin (NEURONTIN) 400 mg capsule       divalproex DR (DEPAKOTE) 500 mg tablet Take 1 Tablet by mouth nightly. 90 Tablet 2    traZODone (DESYREL) 150 mg tablet Take 1 Tablet by mouth nightly as needed for Sleep for up to 90 days.  90 Tablet 0    trihexyphenidyL (ARTANE) 2 mg tablet TAKE 1 TABLET BY MOUTH EVERY DAY 30 Tablet 0    furosemide (LASIX) 40 mg tablet Take 1 Tablet by mouth daily. Indications: Please hold lasix x 3 days, then resume at 40mg daily, which is half of what you were reportedly taking prior to admission. 30 Tablet 0    polyethylene glycol (MIRALAX) 17 gram packet Take 1 Packet by mouth daily. 10 Packet 0    acetaminophen (TYLENOL) 325 mg tablet Take 2 Tablets by mouth every six (6) hours as needed for Pain. 40 Tablet 0    levothyroxine (SYNTHROID) 200 mcg tablet TAKE 1 TABLET EVERY DAY 90 Tablet 3    gabapentin (NEURONTIN) 300 mg capsule Take 1 Capsule by mouth three (3) times daily. Max Daily Amount: 900 mg. 90 Capsule 3    amiodarone (CORDARONE) 200 mg tablet TAKE 1 TABLET EVERY DAY 90 Tablet 3    FeroSuL 325 mg (65 mg iron) tablet Take 1 Tab by mouth daily.  ibuprofen (MOTRIN) 600 mg tablet Take 1 Tab by mouth every eight (8) hours as needed for Pain. 90 Tab 3    multivitamin (MULTIPLE VITAMIN PO) Take 1 Tab by mouth daily.  Eliquis 2.5 mg tablet TAKE 1 TABLET TWICE DAILY 60 Tab 5    fexofenadine (Allegra Allergy) 180 mg tablet Take 1 Tab by mouth daily.  omega-3 fatty acids (Fish Oil Concentrate) 1,000 mg cap Take 1 Tab by mouth daily.  magnesium oxide (MAG-OX) 400 mg tablet Take 1 Tablet by mouth two (2) times a day.  b complex-vitamin c-folic acid 0.8 mg (Isabel-Seth) 0.8 mg tab tablet Take 1 Tab by mouth daily.  cranberry extract 425 mg cap Take 425 mg by mouth two (2) times a day.       baclofen (LIORESAL) 10 mg tablet TAKE 1 TABLET THREE TIMES DAILY 270 Tab 2      Patient Active Problem List   Diagnosis Code    Bipolar I disorder, current episode depressed ([facility]) F31.9    CKD (chronic kidney disease) stage 3, GFR 30-59 ml/min ([facility]) N18.30    Anemia D64.9    Atrial fibrillation with rapid ventricular response ([facility]) I48.91    Phantom limb pain ([facility]) G54.6    Bipolar I disorder ([facility] [address] F31.9    Essential (primary) hypertension I10    Wheelchair bound Z99.3    Fracture of other part of scapula, left shoulder, initial encounter for closed fracture S42.192A    Class 2 obesity in adult E66.9    Shoulder fracture S42.90XA      History reviewed. No pertinent family history. Social History     Socioeconomic History    Marital status:      Spouse name: Not on file    Number of children: 3    Years of education: Not on file    Highest education level: Some college, no degree   Tobacco Use    Smoking status: Never Smoker    Smokeless tobacco: Never Used   Vaping Use    Vaping Use: Never used   Substance and Sexual Activity    Alcohol use: Not Currently     Comment: rare    Drug use: Never    Sexual activity: Yes     Social Determinants of Health     Financial Resource Strain: Low Risk     Difficulty of Paying Living Expenses: Not hard at all   Food Insecurity: No Food Insecurity    Worried About Running Out of Food in the Last Year: Never true    Colten of Food in the Last Year: Never true   Transportation Needs: No Transportation Needs    Lack of Transportation (Medical): No    Lack of Transportation (Non-Medical):  No   Physical Activity:     Days of Exercise per Week:     Minutes of Exercise per Session:    Stress:     Feeling of Stress :    Social Connections:     Frequency of Communication with Friends and Family:     Frequency of Social Gatherings with Friends and Family:     Attends Sabianism Services:     Active Member of Clubs or Organizations:     Attends Club or Organization Meetings:     Marital Status:       Past Surgical History:   Procedure Laterality Date    HX ABOVE KNEE AMPUTATION      right    HX CERVICAL FUSION      HX CHOLECYSTECTOMY      HX GASTRIC BYPASS      HX HYSTERECTOMY      HX ORTHOPAEDIC      neck surgery    HX PARTIAL THYROIDECTOMY        Past Medical History:   Diagnosis Date    Bipolar 1 disorder, depressed ([address]     CHF (congestive heart failure) (HCC)     Chronic neck and back pain     Frequent UTI     Hypercholesteremia     Hypertension     Obese     Psychiatric disorder         I have reviewed and agree with [address] [place] [address] Nw and ROS and intake form in chart and the record furthermore I have reviewed prior medical record(s) regarding this patients care during this appointment. Review of Systems:   Patient is a pleasant appearing individual, appropriately dressed, well hydrated, well nourished, who is alert, appropriately oriented for age, and in no acute distress with a wheelchair bound gait and normal affect who does not appear to be in any significant pain. Physical Exam:  Left Shoulder - Grossly neurovascularly intact. Range of motion-Decreased actively and passively. No Point tenderness, Strength-weakness with abduction, positive crepitation, No skin lesion are identified, No instabilty is noted, Positive apprehension, No Swelling. Right Shoulder - Grossly neurovascularly intact, Full Range of motion, No point tenderness, No weakness, No skin lesions, No Instability, No apprehension, No swelling. Encounter Diagnoses     ICD-10-CM ICD-9-CM   1. Primary osteoarthritis of left shoulder  M19.012 715.11       HPI:  The patient is status post diagnosed with proximal humerus fracture. X-rays revealed adequate healing of the proximal humerus fracture, little bit angulation, but no displacement. Assessment/Plan:  Plan at this point, activities as tolerated started. She is developing some arthritis. We will see her back as needed. No restrictions. As part of continued conservative pain management options the patient was advised to utilize Tylenol or OTC NSAIDS as long as it is not medically contraindicated. Return to Office: Follow-up and Dispositions    · Return for PRN. Scribed by Mariia Johnson as dictated by Okeene Cools. Oanh Do MD.  Documentation True and Accepted Shakeel Do MD

## 2021-09-28 NOTE — LETTER
9/30/2021    Patient: Kathye Epley   YOB: 1945   Date of Visit: 9/28/2021     Tay Parr MD  [address] [place] [id number]  Via In Basket    Dear Tay Parr MD,      Thank you for referring Ms. Malick Smalls to [address] [place] [address] [facility] for evaluation. My notes for this consultation are attached. If you have questions, please do not hesitate to call me. I look forward to following your patient along with you.       Sincerely,    Lyssa Leon MD

## 2021-10-11 NOTE — TELEPHONE ENCOUNTER
The PA at [facility] wants you to give her a call regarding this pateint [phone]

## 2021-12-23 NOTE — ED PROVIDER NOTES
EMERGENCY DEPARTMENT HISTORY AND PHYSICAL EXAM      Date: 12/23/2021  Patient Name: Josey Linares      History of Presenting Illness     Chief Complaint   Patient presents with    Cough    Fatigue       History Provided By: Patient    HPI: Josey Linares, 77 y.o. female with a past medical history significant for PAF,  HTN; HLD; CHF; right AKA; gastric bypass; hysterectomy; bipolar; partial thyroidectomy that presents to the ED with cc of cough productive of yellow brown sputum for the past 4 -  5 d. Pt is from [employer] and was sent to the ER because she has not improved on Augmentin and cipro. Pt denies fever; chills; sore throat; headache; sob; n/v/d; dysuria. States to hearing wheezing sometimes with her cough. There are no other complaints, changes, or physical findings at this time. PCP: Lisa Moeller MD    Current Outpatient Medications   Medication Sig Dispense Refill    amoxicillin-clavulanate (AUGMENTIN) 875-125 mg per tablet       busPIRone (BUSPAR) 15 mg tablet       traZODone (DESYREL) 150 mg tablet       ARIPiprazole (ABILIFY) 10 mg tablet       ciprofloxacin HCl (CIPRO) 500 mg tablet       HYDROcodone-acetaminophen (NORCO)  mg tablet       HYDROcodone-acetaminophen (NORCO) 7.5-325 mg per tablet       ondansetron hcl (ZOFRAN) 4 mg tablet       pantoprazole (PROTONIX) 40 mg tablet       gabapentin (NEURONTIN) 400 mg capsule       divalproex DR (DEPAKOTE) 500 mg tablet Take 1 Tablet by mouth nightly. 90 Tablet 2    trihexyphenidyL (ARTANE) 2 mg tablet TAKE 1 TABLET BY MOUTH EVERY DAY 30 Tablet 0    furosemide (LASIX) 40 mg tablet Take 1 Tablet by mouth daily. Indications: Please hold lasix x 3 days, then resume at 40mg daily, which is half of what you were reportedly taking prior to admission. 30 Tablet 0    polyethylene glycol (MIRALAX) 17 gram packet Take 1 Packet by mouth daily.  10 Packet 0    acetaminophen (TYLENOL) 325 mg tablet Take 2 Tablets by mouth every six (6) hours as needed for Pain. 40 Tablet 0    levothyroxine (SYNTHROID) 200 mcg tablet TAKE 1 TABLET EVERY DAY 90 Tablet 3    gabapentin (NEURONTIN) 300 mg capsule Take 1 Capsule by mouth three (3) times daily. Max Daily Amount: 900 mg. 90 Capsule 3    amiodarone (CORDARONE) 200 mg tablet TAKE 1 TABLET EVERY DAY 90 Tablet 3    FeroSuL 325 mg (65 mg iron) tablet Take 1 Tab by mouth daily.  ibuprofen (MOTRIN) 600 mg tablet Take 1 Tab by mouth every eight (8) hours as needed for Pain. 90 Tab 3    multivitamin (MULTIPLE VITAMIN PO) Take 1 Tab by mouth daily.  Eliquis 2.5 mg tablet TAKE 1 TABLET TWICE DAILY 60 Tab 5    fexofenadine (Allegra Allergy) 180 mg tablet Take 1 Tab by mouth daily.  omega-3 fatty acids (Fish Oil Concentrate) 1,000 mg cap Take 1 Tab by mouth daily.  magnesium oxide (MAG-OX) 400 mg tablet Take 1 Tablet by mouth two (2) times a day.  b complex-vitamin c-folic acid 0.8 mg (Isabel-Seth) 0.8 mg tab tablet Take 1 Tab by mouth daily.  cranberry extract 425 mg cap Take 425 mg by mouth two (2) times a day.  baclofen (LIORESAL) 10 mg tablet TAKE 1 TABLET THREE TIMES DAILY 270 Tab 2       Past History     Past Medical History:  Past Medical History:   Diagnosis Date    Bipolar 1 disorder, depressed ([address]     CHF (congestive heart failure) (HCC)     Chronic neck and back pain     Frequent UTI     Hypercholesteremia     Hypertension     Obese     Psychiatric disorder        Past Surgical History:  Past Surgical History:   Procedure Laterality Date    HX ABOVE KNEE AMPUTATION      right    HX CERVICAL FUSION      HX CHOLECYSTECTOMY      HX GASTRIC BYPASS      HX HYSTERECTOMY      HX ORTHOPAEDIC      neck surgery    HX PARTIAL THYROIDECTOMY         Family History:  No family history on file.     Social History:  Social History     Tobacco Use    Smoking status: Never Smoker    Smokeless tobacco: Never Used   Vaping Use    Vaping Use: Never used   Substance Use Topics    Alcohol use: Not Currently     Comment: rare    Drug use: Never       Allergies: Allergies   Allergen Reactions    Succinylcholine Chloride Unknown (comments)     Other reaction(s): Other (See Comments)  Chest pain radiating into neck    Codeine Unknown (comments)     Other reaction(s): Other (See Comments)  Abdominal pain unless \"synthetic Codeine\"    Hydromorphone Unknown (comments)     Patient states it makes her not stop talking, jittery      Hydromorphone (Bulk) Other (comments)     Makes crazy      Prednisone Unknown (comments)     Other reaction(s): Other (See Comments)    \"It turns me into a witch,makes me mean\"    Topiramate Other (comments)         Review of Systems     Review of Systems   Constitutional: Negative for chills, fatigue and fever. HENT: Negative for sore throat. Respiratory: Positive for cough and wheezing. Negative for shortness of breath. Cardiovascular: Negative for chest pain. Gastrointestinal: Negative for diarrhea and nausea. Genitourinary: Negative for dysuria. Musculoskeletal: Negative for arthralgias and myalgias. Neurological: Negative for headaches. Psychiatric/Behavioral: Negative for confusion. Physical Exam     Physical Exam  Vitals and nursing note reviewed. Constitutional:       General: She is not in acute distress. Appearance: She is well-developed. HENT:      Head: Normocephalic. Mouth/Throat:      Pharynx: No oropharyngeal exudate. Eyes:      General:         Right eye: No discharge. Left eye: No discharge. Extraocular Movements: Extraocular movements intact. Pupils: Pupils are equal, round, and reactive to light. Neck:      Thyroid: No thyromegaly. Vascular: No JVD. Cardiovascular:      Rate and Rhythm: Normal rate and regular rhythm. Heart sounds: No murmur heard. No friction rub. No gallop.     Pulmonary:      Effort: Pulmonary effort is normal. No respiratory distress. Breath sounds: Normal breath sounds. No wheezing. Comments: Rales right base  Abdominal:      General: Bowel sounds are normal. There is no distension. Palpations: Abdomen is soft. Tenderness: There is no abdominal tenderness. Musculoskeletal:         General: No tenderness. Cervical back: Neck supple. Comments: Right AKA   Skin:     General: Skin is warm. Findings: No erythema. Neurological:      Mental Status: She is alert and oriented to person, place, and time. Psychiatric:         Behavior: Behavior normal.         Lab and Diagnostic Study Results     Labs -     Recent Results (from the past 12 hour(s))   COVID-19 RAPID TEST    Collection Time: 12/23/21  4:22 PM   Result Value Ref Range    Specimen source Nasopharyngeal      COVID-19 rapid test Not Detected Not Detected     SARS-COV-2    Collection Time: 12/23/21  4:22 PM   Result Value Ref Range    SARS-CoV-2 Please find results under separate order         Radiologic Studies -   [unfilled]  CT Results  (Last 48 hours)    None        CXR Results  (Last 48 hours)               12/23/21 1619  XR CHEST PORT Final result    Impression:      No acute cardiopulmonary abnormality. Narrative:  EXAM: XR CHEST PORT       CLINICAL INDICATION/HISTORY: cough   -Additional: None       COMPARISON: 7/14/2021       TECHNIQUE: Portable frontal view of the chest       _______________       FINDINGS:       SUPPORT DEVICES: None. HEART AND MEDIASTINUM: Cardiomediastinal silhouette within normal limits. LUNGS AND PLEURAL SPACES: No dense consolidation, large effusion or   pneumothorax. BONES: Right shoulder prosthesis. _______________                 Medical Decision Making and ED Course   - I am the first and primary provider for this patient AND AM THE PRIMARY PROVIDER OF RECORD.     - I reviewed the vital signs, available nursing notes, past medical history, past surgical history, family history and social history. - Initial assessment performed. The patients presenting problems have been discussed, and the staff are in agreement with the care plan formulated and outlined with them. I have encouraged them to ask questions as they arise throughout their visit. Vital Signs-Reviewed the patient's vital signs. Patient Vitals for the past 12 hrs:   Temp Pulse Resp BP SpO2   12/23/21 1811     100 %   12/23/21 1520 98.8 °F (37.1 °C) 87 18 (!) 151/66 96 %       EKG interpretation: (Preliminary): Performed     Records Reviewed: Nursing Notes    ED Course:   VSS; already on augmentin and cipro. CXR negative. Rapid covid negative Good O2 sat. 8:35 PM  I saw pts  on discharge and he began to tell me that the pt has periods of muscle jerks. I advised him that had I known this I would have checked her blood. I wrote a note to his nurse at [facility] that they have their doctor check this. She had no spasms when I evaluated her. Consultations:       Consultations:      Procedures and Critical Care         Disposition     Disposition: Discharge    Remove if not discharged  DISCHARGE PLAN:  1. Current Discharge Medication List      CONTINUE these medications which have NOT CHANGED    Details   amoxicillin-clavulanate (AUGMENTIN) 875-125 mg per tablet       busPIRone (BUSPAR) 15 mg tablet       traZODone (DESYREL) 150 mg tablet       ARIPiprazole (ABILIFY) 10 mg tablet       ciprofloxacin HCl (CIPRO) 500 mg tablet       HYDROcodone-acetaminophen (NORCO)  mg tablet       HYDROcodone-acetaminophen (NORCO) 7.5-325 mg per tablet       ondansetron hcl (ZOFRAN) 4 mg tablet       pantoprazole (PROTONIX) 40 mg tablet       !! gabapentin (NEURONTIN) 400 mg capsule       divalproex DR (DEPAKOTE) 500 mg tablet Take 1 Tablet by mouth nightly.   Qty: 90 Tablet, Refills: 2      trihexyphenidyL (ARTANE) 2 mg tablet TAKE 1 TABLET BY MOUTH EVERY DAY  Qty: 30 Tablet, Refills: 0    Associated Diagnoses: Tremor      furosemide (LASIX) 40 mg tablet Take 1 Tablet by mouth daily. Indications: Please hold lasix x 3 days, then resume at 40mg daily, which is half of what you were reportedly taking prior to admission. Qty: 30 Tablet, Refills: 0    Associated Diagnoses: Essential (primary) hypertension      polyethylene glycol (MIRALAX) 17 gram packet Take 1 Packet by mouth daily. Qty: 10 Packet, Refills: 0      acetaminophen (TYLENOL) 325 mg tablet Take 2 Tablets by mouth every six (6) hours as needed for Pain. Qty: 40 Tablet, Refills: 0      levothyroxine (SYNTHROID) 200 mcg tablet TAKE 1 TABLET EVERY DAY  Qty: 90 Tablet, Refills: 3      !! gabapentin (NEURONTIN) 300 mg capsule Take 1 Capsule by mouth three (3) times daily. Max Daily Amount: 900 mg. Qty: 90 Capsule, Refills: 3    Associated Diagnoses: Neuropathy      amiodarone (CORDARONE) 200 mg tablet TAKE 1 TABLET EVERY DAY  Qty: 90 Tablet, Refills: 3      FeroSuL 325 mg (65 mg iron) tablet Take 1 Tab by mouth daily. ibuprofen (MOTRIN) 600 mg tablet Take 1 Tab by mouth every eight (8) hours as needed for Pain. Qty: 90 Tab, Refills: 3      multivitamin (MULTIPLE VITAMIN PO) Take 1 Tab by mouth daily. Eliquis 2.5 mg tablet TAKE 1 TABLET TWICE DAILY  Qty: 60 Tab, Refills: 5      fexofenadine (Allegra Allergy) 180 mg tablet Take 1 Tab by mouth daily. omega-3 fatty acids (Fish Oil Concentrate) 1,000 mg cap Take 1 Tab by mouth daily. magnesium oxide (MAG-OX) 400 mg tablet Take 1 Tablet by mouth two (2) times a day. b complex-vitamin c-folic acid 0.8 mg (Isabel-Seth) 0.8 mg tab tablet Take 1 Tab by mouth daily. cranberry extract 425 mg cap Take 425 mg by mouth two (2) times a day. baclofen (LIORESAL) 10 mg tablet TAKE 1 TABLET THREE TIMES DAILY  Qty: 270 Tab, Refills: 2       !! - Potential duplicate medications found. Please discuss with provider.         2.   Follow-up Information     Follow up With Specialties Details Why Contact Info Leatha Moeller MD Internal Medicine, Internal Medicine Schedule an appointment as soon as possible for a visit in 1 week  [address]  [address]  [phone]          3. Return to ED if worse   4. Current Discharge Medication List          Diagnosis     Clinical Impression:   1. Mucopurulent chronic bronchitis ([facility] [address]        Attestations:    Diana Sandoval MD    Please note that this dictation was completed with [employer], the computer voice recognition software. Quite often unanticipated grammatical, syntax, homophones, and other interpretive errors are inadvertently transcribed by the computer software. Please disregard these errors. Please excuse any errors that have escaped final proofreading. Thank you.

## 2021-12-23 NOTE — ED TRIAGE NOTES
Pt sent from [facility] for cough, fatigue and low grade fever of 99.6. VSS in route. Pt is currently being treated with abx for having yellow sputum.

## 2021-12-30 NOTE — ED TRIAGE NOTES
Patient states she is here for covid but she thinks she has a UTI. Has one everytime she comes to the ED.  Painful urination

## 2022-01-01 ENCOUNTER — HOSPITAL ENCOUNTER (INPATIENT)
Age: 77
LOS: 4 days | DRG: 871 | End: 2022-01-11
Attending: INTERNAL MEDICINE | Admitting: INTERNAL MEDICINE
Payer: MEDICARE

## 2022-01-01 ENCOUNTER — APPOINTMENT (OUTPATIENT)
Dept: CT IMAGING | Age: 77
DRG: 871 | End: 2022-01-01
Attending: INTERNAL MEDICINE
Payer: MEDICARE

## 2022-01-01 ENCOUNTER — PATIENT OUTREACH (OUTPATIENT)
Dept: CASE MANAGEMENT | Age: 77
End: 2022-01-01

## 2022-01-01 ENCOUNTER — APPOINTMENT (OUTPATIENT)
Dept: GENERAL RADIOLOGY | Age: 77
DRG: 871 | End: 2022-01-01
Attending: INTERNAL MEDICINE
Payer: MEDICARE

## 2022-01-01 VITALS
BODY MASS INDEX: 38.4 KG/M2 | SYSTOLIC BLOOD PRESSURE: 114 MMHG | HEIGHT: 69 IN | DIASTOLIC BLOOD PRESSURE: 52 MMHG | OXYGEN SATURATION: 92 % | HEART RATE: 104 BPM | RESPIRATION RATE: 16 BRPM | TEMPERATURE: 99.6 F | WEIGHT: 259.26 LBS

## 2022-01-01 DIAGNOSIS — U07.1 PNEUMONIA DUE TO COVID-19 VIRUS: Primary | ICD-10-CM

## 2022-01-01 DIAGNOSIS — R09.02 HYPOXIA: ICD-10-CM

## 2022-01-01 DIAGNOSIS — J12.82 PNEUMONIA DUE TO COVID-19 VIRUS: Primary | ICD-10-CM

## 2022-01-01 DIAGNOSIS — A41.9 SEPSIS, DUE TO UNSPECIFIED ORGANISM, UNSPECIFIED WHETHER ACUTE ORGAN DYSFUNCTION PRESENT (HCC): ICD-10-CM

## 2022-01-01 LAB
ALBUMIN SERPL-MCNC: 1.9 G/DL (ref 3.5–4.7)
ALBUMIN/GLOB SERPL: 0.6 {RATIO}
ALP SERPL-CCNC: 166 U/L (ref 38–126)
ALT SERPL-CCNC: 27 U/L (ref 3–52)
AMMONIA PLAS-SCNC: 33 UMOL/L (ref 9–33)
AMMONIA PLAS-SCNC: 61 UMOL/L (ref 9–33)
ANION GAP SERPL CALC-SCNC: 11 MMOL/L
ANION GAP SERPL CALC-SCNC: 11 MMOL/L
ANION GAP SERPL CALC-SCNC: 12 MMOL/L
ANION GAP SERPL CALC-SCNC: 9 MMOL/L
APPEARANCE UR: ABNORMAL
ARTERIAL PATENCY WRIST A: ABNORMAL
AST SERPL W P-5'-P-CCNC: 120 U/L (ref 14–74)
ATRIAL RATE: 103 BPM
BACTERIA SPEC CULT: NORMAL
BACTERIA URNS QL MICRO: ABNORMAL /HPF
BASE EXCESS BLDA CALC-SCNC: 2.6 MMOL/L (ref 0–2.5)
BASOPHILS # BLD: 0 K/UL (ref 0–0.1)
BASOPHILS NFR BLD: 0 % (ref 0–2)
BDY SITE: ABNORMAL
BILIRUB DIRECT SERPL-MCNC: 0.8 MG/DL (ref 0–0.3)
BILIRUB SERPL-MCNC: 1.7 MG/DL (ref 0.2–1)
BILIRUB UR QL: ABNORMAL
BNP SERPL-MCNC: 41 PG/ML (ref 0–100)
BUN SERPL-MCNC: 26 MG/DL (ref 9–21)
BUN SERPL-MCNC: 31 MG/DL (ref 9–21)
BUN SERPL-MCNC: 35 MG/DL (ref 9–21)
BUN SERPL-MCNC: 42 MG/DL (ref 9–21)
BUN/CREAT SERPL: 12
BUN/CREAT SERPL: 12
BUN/CREAT SERPL: 13
BUN/CREAT SERPL: 13
CA-I BLD-MCNC: 7.3 MG/DL (ref 8.5–10.5)
CA-I BLD-MCNC: 7.4 MG/DL (ref 8.5–10.5)
CA-I BLD-MCNC: 7.8 MG/DL (ref 8.5–10.5)
CA-I BLD-MCNC: 7.9 MG/DL (ref 8.5–10.5)
CALCULATED P AXIS, ECG09: 77 DEGREES
CALCULATED R AXIS, ECG10: -47 DEGREES
CALCULATED T AXIS, ECG11: 128 DEGREES
CHLORIDE SERPL-SCNC: 103 MMOL/L (ref 94–111)
CHLORIDE SERPL-SCNC: 105 MMOL/L (ref 94–111)
CHLORIDE SERPL-SCNC: 106 MMOL/L (ref 94–111)
CHLORIDE SERPL-SCNC: 106 MMOL/L (ref 94–111)
CO2 SERPL-SCNC: 25 MMOL/L (ref 21–33)
CO2 SERPL-SCNC: 26 MMOL/L (ref 21–33)
CO2 SERPL-SCNC: 27 MMOL/L (ref 21–33)
CO2 SERPL-SCNC: 28 MMOL/L (ref 21–33)
COHGB MFR BLD: 1 % (ref 0–3)
COLONY COUNT,CNT: NORMAL
COLONY COUNT,CNT: NORMAL
COLOR UR: ABNORMAL
COVID-19 RAPID TEST, COVR: DETECTED
CREAT SERPL-MCNC: 2.2 MG/DL (ref 0.7–1.2)
CREAT SERPL-MCNC: 2.5 MG/DL (ref 0.7–1.2)
CREAT SERPL-MCNC: 2.8 MG/DL (ref 0.7–1.2)
CREAT SERPL-MCNC: 3.2 MG/DL (ref 0.7–1.2)
D DIMER PPP FEU-MCNC: 0.32 UG/ML(FEU)
DIAGNOSIS, 93000: NORMAL
DIFFERENTIAL METHOD BLD: ABNORMAL
EOSINOPHIL # BLD: 0 K/UL (ref 0–0.4)
EOSINOPHIL NFR BLD: 0 % (ref 0–5)
EPITH CASTS URNS QL MICRO: ABNORMAL /LPF (ref 0–20)
ERYTHROCYTE [DISTWIDTH] IN BLOOD BY AUTOMATED COUNT: 17.6 % (ref 11.6–14.5)
ERYTHROCYTE [DISTWIDTH] IN BLOOD BY AUTOMATED COUNT: 17.6 % (ref 11.6–14.5)
ERYTHROCYTE [DISTWIDTH] IN BLOOD BY AUTOMATED COUNT: 18 % (ref 11.6–14.5)
ERYTHROCYTE [DISTWIDTH] IN BLOOD BY AUTOMATED COUNT: 18.2 % (ref 11.6–14.5)
FIO2 ON VENT: 21 %
GLOBULIN SER CALC-MCNC: 3.3 G/DL
GLUCOSE SERPL-MCNC: 100 MG/DL (ref 70–110)
GLUCOSE SERPL-MCNC: 118 MG/DL (ref 70–110)
GLUCOSE SERPL-MCNC: 89 MG/DL (ref 70–110)
GLUCOSE SERPL-MCNC: 95 MG/DL (ref 70–110)
GLUCOSE UR STRIP.AUTO-MCNC: NEGATIVE MG/DL
HCO3 BLDA-SCNC: 25 MMOL/L (ref 23–27)
HCT VFR BLD AUTO: 28.9 % (ref 35–45)
HCT VFR BLD AUTO: 32.7 % (ref 35–45)
HCT VFR BLD AUTO: 33.2 % (ref 35–45)
HCT VFR BLD AUTO: 38.5 % (ref 35–45)
HGB BLD OXIMETRY-MCNC: 12.8 G/DL (ref 12–16)
HGB BLD-MCNC: 10.4 G/DL (ref 12–16)
HGB BLD-MCNC: 10.7 G/DL (ref 12–16)
HGB BLD-MCNC: 12.2 G/DL (ref 12–16)
HGB BLD-MCNC: 8.9 G/DL (ref 12–16)
HGB UR QL STRIP: ABNORMAL
IMM GRANULOCYTES # BLD AUTO: 0 K/UL
IMM GRANULOCYTES # BLD AUTO: 0.2 K/UL (ref 0–0.04)
IMM GRANULOCYTES NFR BLD AUTO: 0 %
IMM GRANULOCYTES NFR BLD AUTO: 1 % (ref 0–0.5)
KETONES UR QL STRIP.AUTO: ABNORMAL MG/DL
LACTATE SERPL-SCNC: 1.2 MMOL/L (ref 0.5–2)
LACTATE SERPL-SCNC: 2.4 MMOL/L (ref 0.5–2)
LACTATE SERPL-SCNC: NORMAL MMOL/L (ref 0.5–2)
LEUKOCYTE ESTERASE UR QL STRIP.AUTO: ABNORMAL
LYMPHOCYTES # BLD: 1.5 K/UL (ref 0.9–3.6)
LYMPHOCYTES # BLD: 1.6 K/UL (ref 0.9–3.6)
LYMPHOCYTES # BLD: 1.8 K/UL (ref 0.9–3.6)
LYMPHOCYTES # BLD: 3.1 K/UL (ref 0.9–3.6)
LYMPHOCYTES NFR BLD: 11 % (ref 21–52)
LYMPHOCYTES NFR BLD: 18 % (ref 21–52)
LYMPHOCYTES NFR BLD: 7 % (ref 21–52)
LYMPHOCYTES NFR BLD: 9 % (ref 21–52)
MAGNESIUM SERPL-MCNC: 2.9 MG/DL (ref 1.7–2.8)
MAGNESIUM SERPL-MCNC: 3.1 MG/DL (ref 1.7–2.8)
MCH RBC QN AUTO: 34.2 PG (ref 24–34)
MCH RBC QN AUTO: 34.7 PG (ref 24–34)
MCH RBC QN AUTO: 35 PG (ref 24–34)
MCH RBC QN AUTO: 35.1 PG (ref 24–34)
MCHC RBC AUTO-ENTMCNC: 30.8 G/DL (ref 31–37)
MCHC RBC AUTO-ENTMCNC: 31.7 G/DL (ref 31–37)
MCHC RBC AUTO-ENTMCNC: 31.8 G/DL (ref 31–37)
MCHC RBC AUTO-ENTMCNC: 32.2 G/DL (ref 31–37)
MCV RBC AUTO: 107.8 FL (ref 78–100)
MCV RBC AUTO: 110.3 FL (ref 78–100)
MCV RBC AUTO: 110.5 FL (ref 78–100)
MCV RBC AUTO: 111.2 FL (ref 78–100)
METHGB MFR BLD: 0.3 % (ref 0–3)
MONOCYTES # BLD: 0.7 K/UL (ref 0.05–1.2)
MONOCYTES # BLD: 0.7 K/UL (ref 0.05–1.2)
MONOCYTES # BLD: 0.8 K/UL (ref 0.05–1.2)
MONOCYTES # BLD: 1 K/UL (ref 0.05–1.2)
MONOCYTES NFR BLD: 3 % (ref 3–10)
MONOCYTES NFR BLD: 4 % (ref 3–10)
MONOCYTES NFR BLD: 5 % (ref 3–10)
MONOCYTES NFR BLD: 6 % (ref 3–10)
NEUTS BAND NFR BLD MANUAL: 1 % (ref 0–5)
NEUTS SEG # BLD: 11.4 K/UL (ref 1.8–8)
NEUTS SEG # BLD: 12.8 K/UL (ref 1.8–8)
NEUTS SEG # BLD: 17.2 K/UL (ref 1.8–8)
NEUTS SEG # BLD: 20.8 K/UL (ref 1.8–8)
NEUTS SEG NFR BLD: 75 % (ref 40–73)
NEUTS SEG NFR BLD: 84 % (ref 40–73)
NEUTS SEG NFR BLD: 87 % (ref 40–73)
NEUTS SEG NFR BLD: 89 % (ref 40–73)
NITRITE UR QL STRIP.AUTO: NEGATIVE
NRBC # BLD: 0 K/UL (ref 0–0.01)
NRBC # BLD: 0 K/UL (ref 0–0.01)
NRBC # BLD: 0.02 K/UL (ref 0–0.01)
NRBC # BLD: 0.04 K/UL (ref 0–0.01)
NRBC BLD-RTO: 0 PER 100 WBC
NRBC BLD-RTO: 0 PER 100 WBC
NRBC BLD-RTO: 0.1 PER 100 WBC
NRBC BLD-RTO: 0.2 PER 100 WBC
OXYHGB MFR BLD: 88.7 % (ref 90–100)
P-R INTERVAL, ECG05: 155 MS
PCO2 BLDA: 33 MMHG (ref 35–45)
PH BLDA: 7.5 [PH] (ref 7.37–7.43)
PH UR STRIP: 5 [PH] (ref 5–8)
PHOSPHATE SERPL-MCNC: 2.5 MG/DL (ref 2.5–4.5)
PLATELET # BLD AUTO: 159 K/UL (ref 135–420)
PLATELET # BLD AUTO: 182 K/UL (ref 135–420)
PLATELET # BLD AUTO: 256 K/UL (ref 135–420)
PLATELET # BLD AUTO: 286 K/UL (ref 135–420)
PMV BLD AUTO: 10.1 FL (ref 9.2–11.8)
PMV BLD AUTO: 10.2 FL (ref 9.2–11.8)
PMV BLD AUTO: 10.4 FL (ref 9.2–11.8)
PMV BLD AUTO: 10.9 FL (ref 9.2–11.8)
PO2 BLDA: 55 MMHG (ref 84–98)
POTASSIUM SERPL-SCNC: 3.4 MMOL/L (ref 3.2–5.1)
POTASSIUM SERPL-SCNC: 3.9 MMOL/L (ref 3.2–5.1)
POTASSIUM SERPL-SCNC: 4.6 MMOL/L (ref 3.2–5.1)
POTASSIUM SERPL-SCNC: 5.3 MMOL/L (ref 3.2–5.1)
PROCALCITONIN SERPL-MCNC: 6.65 NG/ML (ref 0.5–2)
PROT SERPL-MCNC: 5.2 G/DL (ref 6.1–8.4)
PROT UR STRIP-MCNC: ABNORMAL MG/DL
Q-T INTERVAL, ECG07: 357 MS
QRS DURATION, ECG06: 115 MS
QTC CALCULATION (BEZET), ECG08: 468 MS
RBC # BLD AUTO: 2.6 M/UL (ref 4.2–5.3)
RBC # BLD AUTO: 2.96 M/UL (ref 4.2–5.3)
RBC # BLD AUTO: 3.08 M/UL (ref 4.2–5.3)
RBC # BLD AUTO: 3.49 M/UL (ref 4.2–5.3)
RBC #/AREA URNS HPF: >100 /HPF (ref 0–2)
RBC MORPH BLD: ABNORMAL
SAO2 % BLD: 90 % (ref 94–100)
SAO2% DEVICE SAO2% SENSOR NAME: ABNORMAL
SERVICE CMNT-IMP: ABNORMAL
SODIUM SERPL-SCNC: 141 MMOL/L (ref 135–145)
SODIUM SERPL-SCNC: 142 MMOL/L (ref 135–145)
SODIUM SERPL-SCNC: 143 MMOL/L (ref 135–145)
SODIUM SERPL-SCNC: 143 MMOL/L (ref 135–145)
SP GR UR REFRACTOMETRY: 1.02 (ref 1–1.03)
SPECIAL REQUESTS,SREQ: NORMAL
SPECIMEN SITE: ABNORMAL
SPECIMEN SOURCE: ABNORMAL
TROPONIN I SERPL-MCNC: 0.04 NG/ML (ref 0.02–0.05)
UROBILINOGEN UR QL STRIP.AUTO: 0.2 EU/DL (ref 0.2–1)
VENTRICULAR RATE, ECG03: 103 BPM
WBC # BLD AUTO: 13.6 K/UL (ref 4.6–13.2)
WBC # BLD AUTO: 17.1 K/UL (ref 4.6–13.2)
WBC # BLD AUTO: 19.8 K/UL (ref 4.6–13.2)
WBC # BLD AUTO: 23.1 K/UL (ref 4.6–13.2)
WBC URNS QL MICRO: >100 /HPF (ref 0–4)

## 2022-01-01 PROCEDURE — 87040 BLOOD CULTURE FOR BACTERIA: CPT

## 2022-01-01 PROCEDURE — 65270000029 HC RM PRIVATE

## 2022-01-01 PROCEDURE — 77010033678 HC OXYGEN DAILY

## 2022-01-01 PROCEDURE — 74011250636 HC RX REV CODE- 250/636: Performed by: INTERNAL MEDICINE

## 2022-01-01 PROCEDURE — 96374 THER/PROPH/DIAG INJ IV PUSH: CPT

## 2022-01-01 PROCEDURE — 74011000250 HC RX REV CODE- 250: Performed by: INTERNAL MEDICINE

## 2022-01-01 PROCEDURE — 85025 COMPLETE CBC W/AUTO DIFF WBC: CPT

## 2022-01-01 PROCEDURE — 87635 SARS-COV-2 COVID-19 AMP PRB: CPT

## 2022-01-01 PROCEDURE — 74011250637 HC RX REV CODE- 250/637: Performed by: NURSE PRACTITIONER

## 2022-01-01 PROCEDURE — 77010033711 HC HIGH FLOW OXYGEN

## 2022-01-01 PROCEDURE — 93005 ELECTROCARDIOGRAM TRACING: CPT

## 2022-01-01 PROCEDURE — 82803 BLOOD GASES ANY COMBINATION: CPT

## 2022-01-01 PROCEDURE — 94761 N-INVAS EAR/PLS OXIMETRY MLT: CPT

## 2022-01-01 PROCEDURE — 83735 ASSAY OF MAGNESIUM: CPT

## 2022-01-01 PROCEDURE — 80048 BASIC METABOLIC PNL TOTAL CA: CPT

## 2022-01-01 PROCEDURE — 87086 URINE CULTURE/COLONY COUNT: CPT

## 2022-01-01 PROCEDURE — 84484 ASSAY OF TROPONIN QUANT: CPT

## 2022-01-01 PROCEDURE — 82140 ASSAY OF AMMONIA: CPT

## 2022-01-01 PROCEDURE — 81001 URINALYSIS AUTO W/SCOPE: CPT

## 2022-01-01 PROCEDURE — 74011000258 HC RX REV CODE- 258: Performed by: INTERNAL MEDICINE

## 2022-01-01 PROCEDURE — 36415 COLL VENOUS BLD VENIPUNCTURE: CPT

## 2022-01-01 PROCEDURE — 83880 ASSAY OF NATRIURETIC PEPTIDE: CPT

## 2022-01-01 PROCEDURE — 83605 ASSAY OF LACTIC ACID: CPT

## 2022-01-01 PROCEDURE — 71045 X-RAY EXAM CHEST 1 VIEW: CPT

## 2022-01-01 PROCEDURE — 80076 HEPATIC FUNCTION PANEL: CPT

## 2022-01-01 PROCEDURE — 84145 PROCALCITONIN (PCT): CPT

## 2022-01-01 PROCEDURE — 36600 WITHDRAWAL OF ARTERIAL BLOOD: CPT

## 2022-01-01 PROCEDURE — 99284 EMERGENCY DEPT VISIT MOD MDM: CPT

## 2022-01-01 PROCEDURE — 85379 FIBRIN DEGRADATION QUANT: CPT

## 2022-01-01 PROCEDURE — 71250 CT THORAX DX C-: CPT

## 2022-01-01 PROCEDURE — 84100 ASSAY OF PHOSPHORUS: CPT

## 2022-01-01 PROCEDURE — 70450 CT HEAD/BRAIN W/O DYE: CPT

## 2022-01-01 PROCEDURE — 74011250636 HC RX REV CODE- 250/636: Performed by: NURSE PRACTITIONER

## 2022-01-01 RX ORDER — ACETAMINOPHEN 650 MG/1
650 SUPPOSITORY RECTAL
Status: DISCONTINUED | OUTPATIENT
Start: 2022-01-01 | End: 2022-01-01 | Stop reason: HOSPADM

## 2022-01-01 RX ORDER — BUSPIRONE HYDROCHLORIDE 5 MG/1
15 TABLET ORAL 3 TIMES DAILY
Status: DISCONTINUED | OUTPATIENT
Start: 2022-01-01 | End: 2022-01-01

## 2022-01-01 RX ORDER — LANOLIN ALCOHOL/MO/W.PET/CERES
400 CREAM (GRAM) TOPICAL 2 TIMES DAILY
Status: DISCONTINUED | OUTPATIENT
Start: 2022-01-01 | End: 2022-01-01

## 2022-01-01 RX ORDER — SCOLOPAMINE TRANSDERMAL SYSTEM 1 MG/1
1 PATCH, EXTENDED RELEASE TRANSDERMAL
Status: DISCONTINUED | OUTPATIENT
Start: 2022-01-01 | End: 2022-01-01 | Stop reason: HOSPADM

## 2022-01-01 RX ORDER — AMIODARONE HYDROCHLORIDE 200 MG/1
200 TABLET ORAL DAILY
Status: DISCONTINUED | OUTPATIENT
Start: 2022-01-01 | End: 2022-01-01

## 2022-01-01 RX ORDER — TRAZODONE HYDROCHLORIDE 50 MG/1
150 TABLET ORAL
Status: DISCONTINUED | OUTPATIENT
Start: 2022-01-01 | End: 2022-01-01 | Stop reason: HOSPADM

## 2022-01-01 RX ORDER — PANTOPRAZOLE SODIUM 40 MG/1
40 TABLET, DELAYED RELEASE ORAL
Status: DISCONTINUED | OUTPATIENT
Start: 2022-01-01 | End: 2022-01-01

## 2022-01-01 RX ORDER — GUAIFENESIN 100 MG/5ML
100 SOLUTION ORAL
COMMUNITY

## 2022-01-01 RX ORDER — SODIUM CHLORIDE 9 MG/ML
100 INJECTION, SOLUTION INTRAVENOUS CONTINUOUS
Status: DISCONTINUED | OUTPATIENT
Start: 2022-01-01 | End: 2022-01-01

## 2022-01-01 RX ORDER — LANOLIN ALCOHOL/MO/W.PET/CERES
1 CREAM (GRAM) TOPICAL DAILY
Status: DISCONTINUED | OUTPATIENT
Start: 2022-01-01 | End: 2022-01-01

## 2022-01-01 RX ORDER — DEXAMETHASONE SODIUM PHOSPHATE 4 MG/ML
6 INJECTION, SOLUTION INTRA-ARTICULAR; INTRALESIONAL; INTRAMUSCULAR; INTRAVENOUS; SOFT TISSUE
Status: COMPLETED | OUTPATIENT
Start: 2022-01-01 | End: 2022-01-01

## 2022-01-01 RX ORDER — SODIUM POLYSTYRENE SULFONATE 15 G/60ML
15 SUSPENSION ORAL; RECTAL
Status: COMPLETED | OUTPATIENT
Start: 2022-01-01 | End: 2022-01-01

## 2022-01-01 RX ORDER — POLYVINYL ALCOHOL 14 MG/ML
2 SOLUTION/ DROPS OPHTHALMIC 2 TIMES DAILY
COMMUNITY

## 2022-01-01 RX ORDER — GABAPENTIN 300 MG/1
300 CAPSULE ORAL 3 TIMES DAILY
Status: DISCONTINUED | OUTPATIENT
Start: 2022-01-01 | End: 2022-01-01

## 2022-01-01 RX ORDER — AMOXICILLIN 250 MG
2 CAPSULE ORAL
Status: DISCONTINUED | OUTPATIENT
Start: 2022-01-01 | End: 2022-01-01 | Stop reason: HOSPADM

## 2022-01-01 RX ORDER — HALOPERIDOL 5 MG/ML
2 INJECTION INTRAMUSCULAR
Status: DISCONTINUED | OUTPATIENT
Start: 2022-01-01 | End: 2022-01-01 | Stop reason: HOSPADM

## 2022-01-01 RX ORDER — MORPHINE SULFATE 2 MG/ML
1 INJECTION, SOLUTION INTRAMUSCULAR; INTRAVENOUS
Status: DISCONTINUED | OUTPATIENT
Start: 2022-01-01 | End: 2022-01-01 | Stop reason: HOSPADM

## 2022-01-01 RX ORDER — THERA TABS 400 MCG
1 TAB ORAL DAILY
Status: DISCONTINUED | OUTPATIENT
Start: 2022-01-01 | End: 2022-01-01

## 2022-01-01 RX ORDER — SODIUM CHLORIDE 0.9 % (FLUSH) 0.9 %
5-10 SYRINGE (ML) INJECTION AS NEEDED
Status: DISCONTINUED | OUTPATIENT
Start: 2022-01-01 | End: 2022-01-01 | Stop reason: HOSPADM

## 2022-01-01 RX ORDER — CETIRIZINE HCL 10 MG
10 TABLET ORAL DAILY
Status: DISCONTINUED | OUTPATIENT
Start: 2022-01-01 | End: 2022-01-01

## 2022-01-01 RX ORDER — GUAIFENESIN 100 MG/5ML
100 SOLUTION ORAL
Status: DISCONTINUED | OUTPATIENT
Start: 2022-01-01 | End: 2022-01-01 | Stop reason: HOSPADM

## 2022-01-01 RX ORDER — LORAZEPAM 2 MG/ML
1 INJECTION INTRAMUSCULAR
Status: DISCONTINUED | OUTPATIENT
Start: 2022-01-01 | End: 2022-01-01 | Stop reason: HOSPADM

## 2022-01-01 RX ORDER — NYSTATIN 100000 [USP'U]/G
POWDER TOPICAL 2 TIMES DAILY
Status: DISCONTINUED | OUTPATIENT
Start: 2022-01-01 | End: 2022-01-01

## 2022-01-01 RX ORDER — MORPHINE SULFATE 4 MG/ML
4 INJECTION, SOLUTION INTRAMUSCULAR; INTRAVENOUS
Status: DISCONTINUED | OUTPATIENT
Start: 2022-01-01 | End: 2022-01-01 | Stop reason: HOSPADM

## 2022-01-01 RX ORDER — ACETAMINOPHEN 325 MG/1
650 TABLET ORAL
Status: DISCONTINUED | OUTPATIENT
Start: 2022-01-01 | End: 2022-01-01 | Stop reason: HOSPADM

## 2022-01-01 RX ORDER — ONDANSETRON 2 MG/ML
4 INJECTION INTRAMUSCULAR; INTRAVENOUS
Status: DISCONTINUED | OUTPATIENT
Start: 2022-01-01 | End: 2022-01-01 | Stop reason: HOSPADM

## 2022-01-01 RX ORDER — LEVOTHYROXINE SODIUM 100 UG/1
200 TABLET ORAL
Status: DISCONTINUED | OUTPATIENT
Start: 2022-01-01 | End: 2022-01-01

## 2022-01-01 RX ORDER — GLUCOSAM/CHONDRO/HERB 149/HYAL 750-100 MG
1 TABLET ORAL DAILY
Status: DISCONTINUED | OUTPATIENT
Start: 2022-01-01 | End: 2022-01-01

## 2022-01-01 RX ORDER — LEVOFLOXACIN 5 MG/ML
500 INJECTION, SOLUTION INTRAVENOUS
Status: COMPLETED | OUTPATIENT
Start: 2022-01-01 | End: 2022-01-01

## 2022-01-01 RX ORDER — HALOPERIDOL 2 MG/ML
2 SOLUTION ORAL
Status: DISCONTINUED | OUTPATIENT
Start: 2022-01-01 | End: 2022-01-01 | Stop reason: RX

## 2022-01-01 RX ADMIN — LEVOTHYROXINE SODIUM 200 MCG: 0.1 TABLET ORAL at 06:34

## 2022-01-01 RX ADMIN — GABAPENTIN 400 MG: 300 CAPSULE ORAL at 21:34

## 2022-01-01 RX ADMIN — GABAPENTIN 400 MG: 300 CAPSULE ORAL at 10:09

## 2022-01-01 RX ADMIN — FERROUS SULFATE TAB 325 MG (65 MG ELEMENTAL FE) 325 MG: 325 (65 FE) TAB at 09:59

## 2022-01-01 RX ADMIN — GABAPENTIN 400 MG: 300 CAPSULE ORAL at 09:51

## 2022-01-01 RX ADMIN — VANCOMYCIN HYDROCHLORIDE 750 MG: 750 INJECTION, POWDER, LYOPHILIZED, FOR SOLUTION INTRAVENOUS at 23:07

## 2022-01-01 RX ADMIN — SODIUM CHLORIDE, PRESERVATIVE FREE 10 ML: 5 INJECTION INTRAVENOUS at 05:46

## 2022-01-01 RX ADMIN — NYSTATIN: 100000 POWDER TOPICAL at 18:03

## 2022-01-01 RX ADMIN — Medication 1 TABLET: at 09:59

## 2022-01-01 RX ADMIN — VANCOMYCIN HYDROCHLORIDE 1250 MG: 1.25 INJECTION, POWDER, LYOPHILIZED, FOR SOLUTION INTRAVENOUS at 12:42

## 2022-01-01 RX ADMIN — PANTOPRAZOLE SODIUM 40 MG: 40 TABLET, DELAYED RELEASE ORAL at 06:50

## 2022-01-01 RX ADMIN — GABAPENTIN 400 MG: 300 CAPSULE ORAL at 17:34

## 2022-01-01 RX ADMIN — Medication 1 TABLET: at 09:51

## 2022-01-01 RX ADMIN — TRAZODONE HYDROCHLORIDE 150 MG: 50 TABLET ORAL at 22:17

## 2022-01-01 RX ADMIN — GABAPENTIN 400 MG: 300 CAPSULE ORAL at 18:03

## 2022-01-01 RX ADMIN — PIPERACILLIN AND TAZOBACTAM 3.38 G: 3; .375 INJECTION, POWDER, LYOPHILIZED, FOR SOLUTION INTRAVENOUS at 18:29

## 2022-01-01 RX ADMIN — BUSPIRONE HYDROCHLORIDE 15 MG: 5 TABLET ORAL at 09:51

## 2022-01-01 RX ADMIN — THERA TABS 1 TABLET: TAB at 09:51

## 2022-01-01 RX ADMIN — DEXAMETHASONE SODIUM PHOSPHATE 6 MG: 4 INJECTION, SOLUTION INTRA-ARTICULAR; INTRALESIONAL; INTRAMUSCULAR; INTRAVENOUS; SOFT TISSUE at 18:29

## 2022-01-01 RX ADMIN — BUSPIRONE HYDROCHLORIDE 15 MG: 5 TABLET ORAL at 18:03

## 2022-01-01 RX ADMIN — GABAPENTIN 400 MG: 300 CAPSULE ORAL at 22:17

## 2022-01-01 RX ADMIN — AMIODARONE HYDROCHLORIDE 200 MG: 200 TABLET ORAL at 09:51

## 2022-01-01 RX ADMIN — SODIUM CHLORIDE, PRESERVATIVE FREE 10 ML: 5 INJECTION INTRAVENOUS at 21:34

## 2022-01-01 RX ADMIN — BUSPIRONE HYDROCHLORIDE 15 MG: 5 TABLET ORAL at 21:34

## 2022-01-01 RX ADMIN — SODIUM CHLORIDE 1000 ML: 9 INJECTION, SOLUTION INTRAVENOUS at 17:56

## 2022-01-01 RX ADMIN — CEFTRIAXONE 1 G: 1 INJECTION, POWDER, FOR SOLUTION INTRAMUSCULAR; INTRAVENOUS at 15:02

## 2022-01-01 RX ADMIN — Medication 400 MG: at 09:51

## 2022-01-01 RX ADMIN — PANTOPRAZOLE SODIUM 40 MG: 40 TABLET, DELAYED RELEASE ORAL at 06:37

## 2022-01-01 RX ADMIN — THERA TABS 1 TABLET: TAB at 09:59

## 2022-01-01 RX ADMIN — GABAPENTIN 300 MG: 300 CAPSULE ORAL at 00:19

## 2022-01-01 RX ADMIN — APIXABAN 2.5 MG: 2.5 TABLET, FILM COATED ORAL at 09:50

## 2022-01-01 RX ADMIN — PIPERACILLIN AND TAZOBACTAM 3.38 G: 3; .375 INJECTION, POWDER, LYOPHILIZED, FOR SOLUTION INTRAVENOUS at 12:46

## 2022-01-01 RX ADMIN — MORPHINE SULFATE 1 MG: 2 INJECTION, SOLUTION INTRAMUSCULAR; INTRAVENOUS at 18:16

## 2022-01-01 RX ADMIN — LEVOTHYROXINE SODIUM 200 MCG: 0.1 TABLET ORAL at 06:50

## 2022-01-01 RX ADMIN — Medication 400 MG: at 09:59

## 2022-01-01 RX ADMIN — FERROUS SULFATE TAB 325 MG (65 MG ELEMENTAL FE) 325 MG: 325 (65 FE) TAB at 09:51

## 2022-01-01 RX ADMIN — Medication 400 MG: at 17:34

## 2022-01-01 RX ADMIN — BUSPIRONE HYDROCHLORIDE 15 MG: 5 TABLET ORAL at 17:34

## 2022-01-01 RX ADMIN — MORPHINE SULFATE 4 MG: 4 INJECTION, SOLUTION INTRAMUSCULAR; INTRAVENOUS at 05:04

## 2022-01-01 RX ADMIN — SODIUM CHLORIDE 1000 ML: 9 INJECTION, SOLUTION INTRAVENOUS at 18:28

## 2022-01-01 RX ADMIN — APIXABAN 2.5 MG: 2.5 TABLET, FILM COATED ORAL at 09:59

## 2022-01-01 RX ADMIN — PANTOPRAZOLE SODIUM 40 MG: 40 TABLET, DELAYED RELEASE ORAL at 06:34

## 2022-01-01 RX ADMIN — TRAZODONE HYDROCHLORIDE 150 MG: 50 TABLET ORAL at 21:33

## 2022-01-01 RX ADMIN — TRAZODONE HYDROCHLORIDE 150 MG: 50 TABLET ORAL at 22:02

## 2022-01-01 RX ADMIN — BUSPIRONE HYDROCHLORIDE 15 MG: 5 TABLET ORAL at 22:17

## 2022-01-01 RX ADMIN — AMIODARONE HYDROCHLORIDE 200 MG: 200 TABLET ORAL at 09:59

## 2022-01-01 RX ADMIN — NYSTATIN: 100000 POWDER TOPICAL at 10:00

## 2022-01-01 RX ADMIN — NYSTATIN: 100000 POWDER TOPICAL at 18:00

## 2022-01-01 RX ADMIN — CETIRIZINE HYDROCHLORIDE 10 MG: 10 TABLET, FILM COATED ORAL at 10:03

## 2022-01-01 RX ADMIN — PIPERACILLIN AND TAZOBACTAM 3.38 G: 3; .375 INJECTION, POWDER, LYOPHILIZED, FOR SOLUTION INTRAVENOUS at 22:26

## 2022-01-01 RX ADMIN — APIXABAN 2.5 MG: 2.5 TABLET, FILM COATED ORAL at 17:34

## 2022-01-01 RX ADMIN — Medication 400 MG: at 00:19

## 2022-01-01 RX ADMIN — LEVOTHYROXINE SODIUM 200 MCG: 0.1 TABLET ORAL at 06:37

## 2022-01-01 RX ADMIN — BUSPIRONE HYDROCHLORIDE 15 MG: 5 TABLET ORAL at 09:59

## 2022-01-01 RX ADMIN — LEVOFLOXACIN 500 MG: 5 INJECTION, SOLUTION INTRAVENOUS at 17:22

## 2022-01-01 RX ADMIN — APIXABAN 2.5 MG: 2.5 TABLET, FILM COATED ORAL at 00:19

## 2022-01-01 RX ADMIN — SODIUM CHLORIDE, PRESERVATIVE FREE 10 ML: 5 INJECTION INTRAVENOUS at 18:29

## 2022-01-01 RX ADMIN — SODIUM POLYSTYRENE SULFONATE 15 G: 15 SUSPENSION ORAL; RECTAL at 00:21

## 2022-01-01 RX ADMIN — GABAPENTIN 400 MG: 300 CAPSULE ORAL at 09:59

## 2022-01-01 RX ADMIN — SODIUM CHLORIDE, PRESERVATIVE FREE 10 ML: 5 INJECTION INTRAVENOUS at 22:03

## 2022-01-01 RX ADMIN — Medication 400 MG: at 18:03

## 2022-01-01 RX ADMIN — APIXABAN 2.5 MG: 2.5 TABLET, FILM COATED ORAL at 18:03

## 2022-01-01 RX ADMIN — CETIRIZINE HYDROCHLORIDE 10 MG: 10 TABLET, FILM COATED ORAL at 09:59

## 2022-01-01 RX ADMIN — NYSTATIN: 100000 POWDER TOPICAL at 10:03

## 2022-01-01 RX ADMIN — GABAPENTIN 400 MG: 300 CAPSULE ORAL at 22:03

## 2022-01-03 NOTE — PROGRESS NOTES
Date/Time:  1/3/2022 1:15 PM   Call within 2 business days of discharge: Yes   Attempted to reach patient by telephone. Unable to leave HIPPA compliant message requesting a return call. Will attempt to reach patient again.

## 2022-01-04 NOTE — ED PROVIDER NOTES
EMERGENCY DEPARTMENT HISTORY AND PHYSICAL EXAM      Date: 12/30/2021  Patient Name: Dolly Rizvi    History of Presenting Illness     Chief Complaint   Patient presents with    Dysuria       History Provided By: Patient, EMS and Caregiver    HPI: Dolly Rizvi, 77 y.o. female with a past medical history significant Obesity, CHF, bipolar, hypertension and hypercholesterolemia, chronic neck and back pain, frequent UTIs presents to the ED with cc of Covid, complains of urine frequency. Patient brought in by EMS, she has known Covid positive test, states that she has been short of breath and congested, denies chest pain, has a dry cough and occasional wheezing, however states that she believes she has a UTI. She states she has had many UTIs in the past and is similar symptoms, is describing frequency and dysuria. She admits to chills. Admits to nausea but no vomiting. There are no other complaints, changes, or physical findings at this time. PCP: Mary Jo Moeller., MD    No current facility-administered medications on file prior to encounter. Current Outpatient Medications on File Prior to Encounter   Medication Sig Dispense Refill    amoxicillin-clavulanate (AUGMENTIN) 875-125 mg per tablet       busPIRone (BUSPAR) 15 mg tablet       traZODone (DESYREL) 150 mg tablet       ARIPiprazole (ABILIFY) 10 mg tablet       HYDROcodone-acetaminophen (NORCO)  mg tablet       HYDROcodone-acetaminophen (NORCO) 7.5-325 mg per tablet       ondansetron hcl (ZOFRAN) 4 mg tablet       pantoprazole (PROTONIX) 40 mg tablet       gabapentin (NEURONTIN) 400 mg capsule       divalproex DR (DEPAKOTE) 500 mg tablet Take 1 Tablet by mouth nightly. 90 Tablet 2    trihexyphenidyL (ARTANE) 2 mg tablet TAKE 1 TABLET BY MOUTH EVERY DAY 30 Tablet 0    furosemide (LASIX) 40 mg tablet Take 1 Tablet by mouth daily.  Indications: Please hold lasix x 3 days, then resume at 40mg daily, which is half of what you were reportedly taking prior to admission. 30 Tablet 0    polyethylene glycol (MIRALAX) 17 gram packet Take 1 Packet by mouth daily. 10 Packet 0    acetaminophen (TYLENOL) 325 mg tablet Take 2 Tablets by mouth every six (6) hours as needed for Pain. 40 Tablet 0    levothyroxine (SYNTHROID) 200 mcg tablet TAKE 1 TABLET EVERY DAY 90 Tablet 3    gabapentin (NEURONTIN) 300 mg capsule Take 1 Capsule by mouth three (3) times daily. Max Daily Amount: 900 mg. 90 Capsule 3    amiodarone (CORDARONE) 200 mg tablet TAKE 1 TABLET EVERY DAY 90 Tablet 3    FeroSuL 325 mg (65 mg iron) tablet Take 1 Tab by mouth daily.  ibuprofen (MOTRIN) 600 mg tablet Take 1 Tab by mouth every eight (8) hours as needed for Pain. 90 Tab 3    multivitamin (MULTIPLE VITAMIN PO) Take 1 Tab by mouth daily.  Eliquis 2.5 mg tablet TAKE 1 TABLET TWICE DAILY 60 Tab 5    fexofenadine (Allegra Allergy) 180 mg tablet Take 1 Tab by mouth daily.  omega-3 fatty acids (Fish Oil Concentrate) 1,000 mg cap Take 1 Tab by mouth daily.  magnesium oxide (MAG-OX) 400 mg tablet Take 1 Tablet by mouth two (2) times a day.  b complex-vitamin c-folic acid 0.8 mg (Isabel-Seth) 0.8 mg tab tablet Take 1 Tab by mouth daily.  cranberry extract 425 mg cap Take 425 mg by mouth two (2) times a day.       baclofen (LIORESAL) 10 mg tablet TAKE 1 TABLET THREE TIMES DAILY 270 Tab 2       Past History     Past Medical History:  Past Medical History:   Diagnosis Date    Bipolar 1 disorder, depressed ([facility] [address]     CHF (congestive heart failure) (HCC)     Chronic neck and back pain     Frequent UTI     Hypercholesteremia     Hypertension     Obese     Psychiatric disorder        Past Surgical History:  Past Surgical History:   Procedure Laterality Date    HX ABOVE KNEE AMPUTATION      right    HX CERVICAL FUSION      HX CHOLECYSTECTOMY      HX GASTRIC BYPASS      HX HYSTERECTOMY      HX ORTHOPAEDIC      neck surgery    HX PARTIAL THYROIDECTOMY Family History:  History reviewed. No pertinent family history. Social History:  Social History     Tobacco Use    Smoking status: Never Smoker    Smokeless tobacco: Never Used   Vaping Use    Vaping Use: Never used   Substance Use Topics    Alcohol use: Not Currently     Comment: rare    Drug use: Never       Allergies: Allergies   Allergen Reactions    Succinylcholine Chloride Unknown (comments)     Other reaction(s): Other (See Comments)  Chest pain radiating into neck    Codeine Unknown (comments)     Other reaction(s): Other (See Comments)  Abdominal pain unless \"synthetic Codeine\"    Hydromorphone Unknown (comments)     Patient states it makes her not stop talking, jittery      Hydromorphone (Bulk) Other (comments)     Makes crazy      Prednisone Unknown (comments)     Other reaction(s): Other (See Comments)    \"It turns me into a witch,makes me mean\"    Topiramate Other (comments)         Review of Systems     Review of Systems   Constitutional: Positive for chills, fatigue and fever. HENT: Positive for congestion. Negative for sore throat. Respiratory: Positive for cough, shortness of breath and wheezing. Cardiovascular: Positive for leg swelling. Negative for chest pain. Gastrointestinal: Negative for abdominal distention, nausea and vomiting. Genitourinary: Negative for difficulty urinating, dysuria, flank pain, frequency, hematuria, urgency, vaginal bleeding and vaginal discharge. Musculoskeletal: Negative for arthralgias and joint swelling. Skin: Negative for rash and wound. Neurological: Negative for dizziness, weakness, light-headedness and headaches. Hematological: Negative for adenopathy. Physical Exam     Physical Exam  Vitals and nursing note reviewed. Constitutional:       General: She is not in acute distress. Appearance: She is well-developed. She is not diaphoretic.       Comments: Morbidly obese, chronically ill looking female who appears in no acute distress. Pulse ox is 95 to 97% on room air. HENT:      Head: Normocephalic and atraumatic. Jaw: No trismus. Right Ear: External ear normal. No swelling or tenderness. Tympanic membrane is not perforated, erythematous or bulging. Left Ear: External ear normal. No swelling or tenderness. Tympanic membrane is not perforated, erythematous or bulging. Nose: Nose normal. No mucosal edema or rhinorrhea. Right Sinus: No maxillary sinus tenderness or frontal sinus tenderness. Left Sinus: No maxillary sinus tenderness or frontal sinus tenderness. Mouth/Throat:      Mouth: No oral lesions. Dentition: No dental abscesses. Pharynx: Uvula midline. No oropharyngeal exudate, posterior oropharyngeal erythema or uvula swelling. Tonsils: No tonsillar abscesses. Eyes:      General: No scleral icterus. Right eye: No discharge. Left eye: No discharge. Conjunctiva/sclera: Conjunctivae normal.   Cardiovascular:      Rate and Rhythm: Normal rate and regular rhythm. Heart sounds: Normal heart sounds. No murmur heard. No friction rub. No gallop. Pulmonary:      Effort: Pulmonary effort is normal. No tachypnea, accessory muscle usage or respiratory distress. Breath sounds: Normal breath sounds. No decreased breath sounds, wheezing, rhonchi or rales. Abdominal:      Palpations: Abdomen is soft. Musculoskeletal:         General: No tenderness. Normal range of motion. Cervical back: Normal range of motion and neck supple. Comments: Both lower extremities. Chronically edematous, 1+ today. Lymphadenopathy:      Cervical: No cervical adenopathy. Skin:     General: Skin is warm and dry. Findings: No erythema or rash. Comments: There is easy bruising and skin excoriation left forearm. The distal forearm is dressed in calyx. No drainage noted through the dressings. Neurological:      General: No focal deficit present. Mental Status: She is alert and oriented to person, place, and time. Mental status is at baseline. Cranial Nerves: No cranial nerve deficit. Sensory: No sensory deficit. Psychiatric:         Judgment: Judgment normal.         Lab and Diagnostic Study Results     Labs -   No results found for this or any previous visit (from the past 12 hour(s)). Radiologic Studies -   @lastxrresult@  CT Results  (Last 48 hours)    None        CXR Results  (Last 48 hours)    None            Medical Decision Making   - I am the first provider for this patient. - I reviewed the vital signs, available nursing notes, past medical history, past surgical history, family history and social history. - Initial assessment performed. The patients presenting problems have been discussed, and they are in agreement with the care plan formulated and outlined with them. I have encouraged them to ask questions as they arise throughout their visit. Vital Signs-Reviewed the patient's vital signs. No data found. Records Reviewed: Nursing Notes, Old Medical Records, Previous Radiology Studies and Previous Laboratory Studies    The patient presents with Covid symptoms but complaining of urinary symptoms as well with a differential diagnosis of pneumonia, CHF, pneumothorax, pleural effusions, OPD exacerbation, pleural abnormalities, Acacian reaction, UTI, Donny      ED Course:   Patient with history of Covid however chest x-ray shows faint left upper lung infiltrate. Patient's pulse ox remained above 95 most of the time in ED. She has no fever, she has no leukocytosis. She does however have a UTI and is on Eliquis. Reason for Eliquis unclear but suspect may be DVTs, previous EKG showed no A. fib and last echo 12/20 EF was 66%. Patient given a gram of Rocephin in ED for the UTI and discharged on Bactrim. Urine has been cultured.        Provider Notes (Medical Decision Making):           Procedures   Medical Decision Makingedical Decision Making      Disposition   Disposition: Condition stable  DC- Adult Discharges: All of the diagnostic tests were reviewed and questions answered. Diagnosis, care plan and treatment options were discussed. The patient understands the instructions and will follow up as directed. The patients results have been reviewed with them. They have been counseled regarding their diagnosis. The patient and caregiver verbally convey understanding and agreement of the signs, symptoms, diagnosis, treatment and prognosis and additionally agrees to follow up as recommended with their PCP in 24 - 48 hours. They also agree with the care-plan and convey that all of their questions have been answered. I have also put together some discharge instructions for them that include: 1) educational information regarding their diagnosis, 2) how to care for their diagnosis at home, as well a 3) list of reasons why they would want to return to the ED prior to their follow-up appointment, should their condition change. Diagnosis:   1. Urinary tract infection with hematuria, site unspecified    2. COVID          Disposition:     Follow-up Information     Follow up With Specialties Details Why Contact Info    Dav Moeller MD Internal Medicine, Internal Medicine In 1 day  [employer] [employer] [address] [place] [id number] [phone]      [facility] EMERGENCY DEPT Emergency Medicine  If symptoms worsen [place] [id number]  [phone]          Discharge Medication List as of 12/30/2021  8:32 PM      START taking these medications    Details   trimethoprim-sulfamethoxazole (Bactrim DS) 160-800 mg per tablet Take 1 Tablet by mouth two (2) times a day for 7 days. , Normal, Disp-14 Tablet, R-0         CONTINUE these medications which have NOT CHANGED    Details   amoxicillin-clavulanate (AUGMENTIN) 875-125 mg per tablet Historical Med      busPIRone (BUSPAR) 15 mg tablet Historical Med      traZODone (DESYREL) 150 mg tablet Historical Med      ARIPiprazole (ABILIFY) 10 mg tablet Historical Med      HYDROcodone-acetaminophen (NORCO)  mg tablet Historical Med      HYDROcodone-acetaminophen (NORCO) 7.5-325 mg per tablet Historical Med      ondansetron hcl (ZOFRAN) 4 mg tablet Historical Med      pantoprazole (PROTONIX) 40 mg tablet Historical Med      !! gabapentin (NEURONTIN) 400 mg capsule Historical Med      divalproex DR (DEPAKOTE) 500 mg tablet Take 1 Tablet by mouth nightly., Normal, Disp-90 Tablet, R-2      trihexyphenidyL (ARTANE) 2 mg tablet TAKE 1 TABLET BY MOUTH EVERY DAY, Normal, Disp-30 Tablet, R-0      furosemide (LASIX) 40 mg tablet Take 1 Tablet by mouth daily. Indications: Please hold lasix x 3 days, then resume at 40mg daily, which is half of what you were reportedly taking prior to admission., Normal, Disp-30 Tablet, R-0      polyethylene glycol (MIRALAX) 17 gram packet Take 1 Packet by mouth daily. , Normal, Disp-10 Packet, R-0      acetaminophen (TYLENOL) 325 mg tablet Take 2 Tablets by mouth every six (6) hours as needed for Pain., Normal, Disp-40 Tablet, R-0      levothyroxine (SYNTHROID) 200 mcg tablet TAKE 1 TABLET EVERY DAY, Normal, Disp-90 Tablet, R-3      !! gabapentin (NEURONTIN) 300 mg capsule Take 1 Capsule by mouth three (3) times daily. Max Daily Amount: 900 mg., Normal, Disp-90 Capsule, R-3      amiodarone (CORDARONE) 200 mg tablet TAKE 1 TABLET EVERY DAY, Normal, Disp-90 Tablet, R-3      FeroSuL 325 mg (65 mg iron) tablet Take 1 Tab by mouth daily. , Historical Med, ZEHRA      ibuprofen (MOTRIN) 600 mg tablet Take 1 Tab by mouth every eight (8) hours as needed for Pain., Normal, Disp-90 Tab, R-3      multivitamin (MULTIPLE VITAMIN PO) Take 1 Tab by mouth daily. , Historical Med      Eliquis 2.5 mg tablet TAKE 1 TABLET TWICE DAILY, Normal, Disp-60 Tab, R-5      fexofenadine (Allegra Allergy) 180 mg tablet Take 1 Tab by mouth daily. , Historical Med      omega-3 fatty acids (Fish Oil Concentrate) 1,000 mg cap Take 1 Tab by mouth daily. , Historical Med      magnesium oxide (MAG-OX) 400 mg tablet Take 1 Tablet by mouth two (2) times a day., Historical Med      b complex-vitamin c-folic acid 0.8 mg (Isabel-Seth) 0.8 mg tab tablet Take 1 Tab by mouth daily. , Historical Med      cranberry extract 425 mg cap Take 425 mg by mouth two (2) times a day., Historical Med      baclofen (LIORESAL) 10 mg tablet TAKE 1 TABLET THREE TIMES DAILY, Normal, Disp-270 Tab,R-2       !! - Potential duplicate medications found. Please discuss with provider. STOP taking these medications       ciprofloxacin HCl (CIPRO) 500 mg tablet Comments:   Reason for Stopping:               DISCHARGE PLAN:  1. Cannot display discharge medications since this patient is not currently admitted. 2.   Follow-up Information     Follow up With Specialties Details Why Contact Info    Edilberto Moeller MD Internal Medicine, Internal Medicine In 1 day  [facility] [employer] [facility] [id number] [phone]      [facility] EMERGENCY DEPT Emergency Medicine  If symptoms worsen [place] [id number]  [phone]        3. Return to ED if worse   4. Discharge Medication List as of 12/30/2021  8:32 PM      START taking these medications    Details   trimethoprim-sulfamethoxazole (Bactrim DS) 160-800 mg per tablet Take 1 Tablet by mouth two (2) times a day for 7 days. , Normal, Disp-14 Tablet, R-0         CONTINUE these medications which have NOT CHANGED    Details   amoxicillin-clavulanate (AUGMENTIN) 875-125 mg per tablet Historical Med      busPIRone (BUSPAR) 15 mg tablet Historical Med      traZODone (DESYREL) 150 mg tablet Historical Med      ARIPiprazole (ABILIFY) 10 mg tablet Historical Med      HYDROcodone-acetaminophen (NORCO)  mg tablet Historical Med      HYDROcodone-acetaminophen (NORCO) 7.5-325 mg per tablet Historical Med      ondansetron hcl (ZOFRAN) 4 mg tablet Historical Med pantoprazole (PROTONIX) 40 mg tablet Historical Med      !! gabapentin (NEURONTIN) 400 mg capsule Historical Med      divalproex DR (DEPAKOTE) 500 mg tablet Take 1 Tablet by mouth nightly., Normal, Disp-90 Tablet, R-2      trihexyphenidyL (ARTANE) 2 mg tablet TAKE 1 TABLET BY MOUTH EVERY DAY, Normal, Disp-30 Tablet, R-0      furosemide (LASIX) 40 mg tablet Take 1 Tablet by mouth daily. Indications: Please hold lasix x 3 days, then resume at 40mg daily, which is half of what you were reportedly taking prior to admission., Normal, Disp-30 Tablet, R-0      polyethylene glycol (MIRALAX) 17 gram packet Take 1 Packet by mouth daily. , Normal, Disp-10 Packet, R-0      acetaminophen (TYLENOL) 325 mg tablet Take 2 Tablets by mouth every six (6) hours as needed for Pain., Normal, Disp-40 Tablet, R-0      levothyroxine (SYNTHROID) 200 mcg tablet TAKE 1 TABLET EVERY DAY, Normal, Disp-90 Tablet, R-3      !! gabapentin (NEURONTIN) 300 mg capsule Take 1 Capsule by mouth three (3) times daily. Max Daily Amount: 900 mg., Normal, Disp-90 Capsule, R-3      amiodarone (CORDARONE) 200 mg tablet TAKE 1 TABLET EVERY DAY, Normal, Disp-90 Tablet, R-3      FeroSuL 325 mg (65 mg iron) tablet Take 1 Tab by mouth daily. , Historical Med, ZEHRA      ibuprofen (MOTRIN) 600 mg tablet Take 1 Tab by mouth every eight (8) hours as needed for Pain., Normal, Disp-90 Tab, R-3      multivitamin (MULTIPLE VITAMIN PO) Take 1 Tab by mouth daily. , Historical Med      Eliquis 2.5 mg tablet TAKE 1 TABLET TWICE DAILY, Normal, Disp-60 Tab, R-5      fexofenadine (Allegra Allergy) 180 mg tablet Take 1 Tab by mouth daily. , Historical Med      omega-3 fatty acids (Fish Oil Concentrate) 1,000 mg cap Take 1 Tab by mouth daily. , Historical Med      magnesium oxide (MAG-OX) 400 mg tablet Take 1 Tablet by mouth two (2) times a day., Historical Med      b complex-vitamin c-folic acid 0.8 mg (Isabel-Seth) 0.8 mg tab tablet Take 1 Tab by mouth daily. , Historical Med cranberry extract 425 mg cap Take 425 mg by mouth two (2) times a day., Historical Med      baclofen (LIORESAL) 10 mg tablet TAKE 1 TABLET THREE TIMES DAILY, Normal, Disp-270 Tab,R-2       !! - Potential duplicate medications found. Please discuss with provider. STOP taking these medications       ciprofloxacin HCl (CIPRO) 500 mg tablet Comments:   Reason for Stopping:                 Diagnosis     Clinical Impression:   1. Urinary tract infection with hematuria, site unspecified    2. COVID        Attestations:    Tato Sin MD    Please note that this dictation was completed with [employer], the [employer] voice recognition software. Quite often unanticipated grammatical, syntax, homophones, and other interpretive errors are inadvertently transcribed by the computer software. Please disregard these errors. Please excuse any errors that have escaped final proofreading. Thank you.

## 2022-01-04 NOTE — PROGRESS NOTES
Date/Time:  1/4/2022 3:29 PM   Call within 2 business days of discharge: Yes   Attempted to reach patient by telephone. Unable to leave HIPPA compliant message requesting a return call. Will attempt to reach patient again. This is second attempt to contact patient. Episode resolved.

## 2022-01-07 PROBLEM — R09.02 HYPOXIA: Status: ACTIVE | Noted: 2022-01-01

## 2022-01-07 PROBLEM — A41.9 SEPSIS (HCC): Status: ACTIVE | Noted: 2022-01-01

## 2022-01-07 PROBLEM — J12.82 PNEUMONIA DUE TO COVID-19 VIRUS: Status: ACTIVE | Noted: 2022-01-01

## 2022-01-07 PROBLEM — U07.1 PNEUMONIA DUE TO COVID-19 VIRUS: Status: ACTIVE | Noted: 2022-01-01

## 2022-01-07 NOTE — ED TRIAGE NOTES
Pot sent from [facility] for hypoxia. On arrival ems notified that pt is covid positive and has low 02. Ems states that pt was placed on 2L NC by staff at [facility] but the tubing was noted to be very long. EMS placed pt on 4L NC on a NC with no extension tubing and 02 sats increased to 100%. On arrival to ER NC decreased to 2L NC and sats were 97% - and when placed on room air pt is 93%.

## 2022-01-07 NOTE — ED PROVIDER NOTES
EMERGENCY DEPARTMENT HISTORY AND PHYSICAL EXAM      Date: 1/7/2022  Patient Name: Marcie Vences      History of Presenting Illness     Chief Complaint   Patient presents with    Other     sent from [facility] for hypoxia       History Provided By: EMS; NH notes    HPI: Marcie Vences, 77 y.o. female DNR with a past medical history significant COVID positive, morbid obesity, frequent UTIs, hypertension, hypercholesterolemia, hypothyroid, CHF, bipolar, right above-the-knee amputation, cervical fusion, gastric bypass, cholecystectomy, partial thyroidectomy, that presents to the ED from [facility] with altered mental status and low oxygen saturation. Patient was diagnosed with COVID in late December. Previous ER notes states that the patient is on Eliquis perhaps due to left lower extremity DVT. Patient is confused and I am not able to obtain a history from her. When asked if she has any pain she tells me that she does not have gabapentin. There are no other complaints, changes, or physical findings at this time.     PCP: Bolivar Moeller., MD    Current Facility-Administered Medications   Medication Dose Route Frequency Provider Last Rate Last Admin    piperacillin-tazobactam (ZOSYN) 3.375 g in 0.9% sodium chloride (MBP/ADV) 100 mL MBP  3.375 g IntraVENous NOW Prerna Jewell MD        levoFLOXacin (LEVAQUIN) 500 mg in D5W IVPB  500 mg IntraVENous NOW Prerna Jewell MD        dexamethasone (DECADRON) 4 mg/mL injection 6 mg  6 mg IntraVENous NOW Prerna Jewell MD        sodium chloride (NS) flush 5-10 mL  5-10 mL IntraVENous PRN Prerna Jewell MD        sodium chloride 0.9 % bolus infusion 1,000 mL  1,000 mL IntraVENous ONCE Jerry Jewell MD        Followed by   [address] [place] [address] sodium chloride 0.9 % bolus infusion 1,000 mL  1,000 mL IntraVENous ONCE Prerna Jewell MD         Current Outpatient Medications   Medication Sig Dispense Refill    amoxicillin-clavulanate (AUGMENTIN) 875-125 mg per tablet       busPIRone (BUSPAR) 15 mg tablet       traZODone (DESYREL) 150 mg tablet       ARIPiprazole (ABILIFY) 10 mg tablet       HYDROcodone-acetaminophen (NORCO)  mg tablet       HYDROcodone-acetaminophen (NORCO) 7.5-325 mg per tablet       ondansetron hcl (ZOFRAN) 4 mg tablet       pantoprazole (PROTONIX) 40 mg tablet       gabapentin (NEURONTIN) 400 mg capsule       divalproex DR (DEPAKOTE) 500 mg tablet Take 1 Tablet by mouth nightly. 90 Tablet 2    trihexyphenidyL (ARTANE) 2 mg tablet TAKE 1 TABLET BY MOUTH EVERY DAY 30 Tablet 0    furosemide (LASIX) 40 mg tablet Take 1 Tablet by mouth daily. Indications: Please hold lasix x 3 days, then resume at 40mg daily, which is half of what you were reportedly taking prior to admission. 30 Tablet 0    polyethylene glycol (MIRALAX) 17 gram packet Take 1 Packet by mouth daily. 10 Packet 0    acetaminophen (TYLENOL) 325 mg tablet Take 2 Tablets by mouth every six (6) hours as needed for Pain. 40 Tablet 0    levothyroxine (SYNTHROID) 200 mcg tablet TAKE 1 TABLET EVERY DAY 90 Tablet 3    gabapentin (NEURONTIN) 300 mg capsule Take 1 Capsule by mouth three (3) times daily. Max Daily Amount: 900 mg. 90 Capsule 3    amiodarone (CORDARONE) 200 mg tablet TAKE 1 TABLET EVERY DAY 90 Tablet 3    FeroSuL 325 mg (65 mg iron) tablet Take 1 Tab by mouth daily.  ibuprofen (MOTRIN) 600 mg tablet Take 1 Tab by mouth every eight (8) hours as needed for Pain. 90 Tab 3    multivitamin (MULTIPLE VITAMIN PO) Take 1 Tab by mouth daily.  Eliquis 2.5 mg tablet TAKE 1 TABLET TWICE DAILY 60 Tab 5    fexofenadine (Allegra Allergy) 180 mg tablet Take 1 Tab by mouth daily.  omega-3 fatty acids (Fish Oil Concentrate) 1,000 mg cap Take 1 Tab by mouth daily.  magnesium oxide (MAG-OX) 400 mg tablet Take 1 Tablet by mouth two (2) times a day.       b complex-vitamin c-folic acid 0.8 mg (Isabel-Seth) 0.8 mg tab tablet Take 1 Tab by mouth daily.  cranberry extract 425 mg cap Take 425 mg by mouth two (2) times a day.  baclofen (LIORESAL) 10 mg tablet TAKE 1 TABLET THREE TIMES DAILY 270 Tab 2       Past History     Past Medical History:  Past Medical History:   Diagnosis Date    Bipolar 1 disorder, depressed ([address]     CHF (congestive heart failure) (HCC)     Chronic neck and back pain     Frequent UTI     Hypercholesteremia     Hypertension     Obese     Psychiatric disorder        Past Surgical History:  Past Surgical History:   Procedure Laterality Date    HX ABOVE KNEE AMPUTATION      right    HX CERVICAL FUSION      HX CHOLECYSTECTOMY      HX GASTRIC BYPASS      HX HYSTERECTOMY      HX ORTHOPAEDIC      neck surgery    HX PARTIAL THYROIDECTOMY         Family History:  History reviewed. No pertinent family history. Social History:  Social History     Tobacco Use    Smoking status: Never Smoker    Smokeless tobacco: Never Used   Vaping Use    Vaping Use: Never used   Substance Use Topics    Alcohol use: Not Currently     Comment: rare    Drug use: Never       Allergies: Allergies   Allergen Reactions    Succinylcholine Chloride Unknown (comments)     Other reaction(s): Other (See Comments)  Chest pain radiating into neck    Codeine Unknown (comments)     Other reaction(s): Other (See Comments)  Abdominal pain unless \"synthetic Codeine\"    Hydromorphone Unknown (comments)     Patient states it makes her not stop talking, jittery      Hydromorphone (Bulk) Other (comments)     Makes crazy      Prednisone Unknown (comments)     Other reaction(s): Other (See Comments)    \"It turns me into a witch,makes me mean\"    Topiramate Other (comments)         Review of Systems     Review of Systems   Unable to perform ROS: Mental status change       Physical Exam     Physical Exam  Vitals and nursing note reviewed. Constitutional:       Appearance: She is well-developed. She is obese.  She is ill-appearing. Comments: Obese female laying in bed lethargic. She responds to voice and follows commands to open her eyes or squeeze my hand. Her answers are confused. She does not appear to be in respiratory distress. HENT:      Head: Normocephalic. Mouth/Throat:      Pharynx: Oropharynx is clear. Eyes:      Extraocular Movements: Extraocular movements intact. Conjunctiva/sclera: Conjunctivae normal.      Pupils: Pupils are equal, round, and reactive to light. Neck:      Thyroid: No thyromegaly. Vascular: No JVD. Cardiovascular:      Rate and Rhythm: Normal rate and regular rhythm. Heart sounds: No murmur heard. Pulmonary:      Effort: Pulmonary effort is normal. No respiratory distress. Breath sounds: Rhonchi present. No wheezing. Abdominal:      General: Bowel sounds are normal. There is no distension. Palpations: Abdomen is soft. Tenderness: There is no abdominal tenderness. Musculoskeletal:         General: Normal range of motion. Cervical back: Neck supple. Comments: Right above-the-knee amputation. Massive edema of the left lower extremity which appears shiny. Not warm. No erythema. Skin:     General: Skin is warm and dry. Findings: No erythema. Neurological:      Mental Status: She is alert. She is disoriented.          Lab and Diagnostic Study Results     Labs -     Recent Results (from the past 12 hour(s))   BLOOD GAS, ARTERIAL    Collection Time: 01/07/22 12:15 PM   Result Value Ref Range    pH 7.50 (H) 7.37 - 7.43      PCO2 33 (L) 35.0 - 45.0 mmHg    PO2 55 (L) 84 - 98 mmHg    O2 SAT 90 (L) 94 - 100 %    BICARBONATE 25 23.0 - 27.0 mmol/L    BASE EXCESS 2.6 (H) 0.0 - 2.5 mmol/L    O2 METHOD Room air      FIO2 21.0 %    Sample source Arterial      SITE Left Radial      TAHMINA'S TEST PASS      Critical value read back CALLED MD     Carboxy-Hgb 1.0 0 - 3 %    Methemoglobin 0.3 0 - 3 %    tHb 12.8 12.0 - 16.0 g/dL Oxyhemoglobin 88.7 (LL) 90 - 100 %   AMMONIA    Collection Time: 01/07/22  1:00 PM   Result Value Ref Range    Ammonia 61 (H) 9 - 33 umol/L   EKG, 12 LEAD, INITIAL    Collection Time: 01/07/22  1:16 PM   Result Value Ref Range    Ventricular Rate 103 BPM    Atrial Rate 103 BPM    P-R Interval 155 ms    QRS Duration 115 ms    Q-T Interval 357 ms    QTC Calculation (Bezet) 468 ms    Calculated P Axis 77 degrees    Calculated R Axis -47 degrees    Calculated T Axis 128 degrees    Diagnosis       Sinus tachycardia  Incomplete left bundle branch block  LVH with secondary repolarization abnormality    Confirmed by MERCDEES Borges ([id number]) on 1/7/2022 3:54:59 PM     CBC WITH AUTOMATED DIFF    Collection Time: 01/07/22  2:00 PM   Result Value Ref Range    WBC 17.1 (H) 4.6 - 13.2 K/uL    RBC 3.49 (L) 4.20 - 5.30 M/uL    HGB 12.2 12.0 - 16.0 g/dL    HCT 38.5 35.0 - 45.0 %    .3 (H) 78.0 - 100.0 FL    MCH 35.0 (H) 24.0 - 34.0 PG    MCHC 31.7 31.0 - 37.0 g/dL    RDW 18.0 (H) 11.6 - 14.5 %    PLATELET 265 916 - 142 K/uL    MPV 10.9 9.2 - 11.8 FL    NRBC 0.0 0.0  WBC    ABSOLUTE NRBC 0.00 0.00 - 0.01 K/uL    NEUTROPHILS 75 (H) 40 - 73 %    LYMPHOCYTES 18 (L) 21 - 52 %    MONOCYTES 6 3 - 10 %    EOSINOPHILS 0 0 - 5 %    BASOPHILS 0 0 - 2 %    IMMATURE GRANULOCYTES 1 (H) 0 - 0.5 %    ABS. NEUTROPHILS 12.8 (H) 1.8 - 8.0 K/UL    ABS. LYMPHOCYTES 3.1 0.9 - 3.6 K/UL    ABS. MONOCYTES 1.0 0.05 - 1.2 K/UL    ABS. EOSINOPHILS 0.0 0.0 - 0.4 K/UL    ABS. BASOPHILS 0.0 0.0 - 0.1 K/UL    ABS. IMM.  GRANS. 0.2 (H) 0.00 - 0.04 K/UL    DF AUTOMATED      RBC COMMENTS Macrocytosis  1+        RBC COMMENTS Rouleaux  1+       METABOLIC PANEL, BASIC    Collection Time: 01/07/22  2:00 PM   Result Value Ref Range    Sodium 141 135 - 145 mmol/L    Potassium 5.3 (H) 3.2 - 5.1 mmol/L    Chloride 103 94 - 111 mmol/L    CO2 27 21 - 33 mmol/L    Anion gap 11 mmol/L    Glucose 95 70 - 110 mg/dL    BUN 26 (H) 9 - 21 mg/dL    Creatinine 2.20 (H) 0.70 - 1.20 mg/dL    BUN/Creatinine ratio 12      GFR est AA 26 ml/min/1.73m2    GFR est non-AA 22 ml/min/1.73m2    Calcium 7.9 (L) 8.5 - 10.5 mg/dL   D DIMER    Collection Time: 01/07/22  2:00 PM   Result Value Ref Range    D DIMER 0.32 <0.46 ug/ml(FEU)   TROPONIN I    Collection Time: 01/07/22  2:00 PM   Result Value Ref Range    Troponin-I, Qt. 0.04 0.02 - 0.05 ng/mL   BNP    Collection Time: 01/07/22  2:00 PM   Result Value Ref Range    BNP 41 0 - 100 pg/mL   HEPATIC FUNCTION PANEL    Collection Time: 01/07/22  2:00 PM   Result Value Ref Range    Protein, total 5.2 (L) 6.1 - 8.4 g/dL    Albumin 1.9 (L) 3.5 - 4.7 g/dL    Globulin 3.3 g/dL    A-G Ratio 0.6      Bilirubin, total 1.7 (H) 0.2 - 1.0 mg/dL    Bilirubin, direct 0.8 (H) 0.0 - 0.3 mg/dL    Alk. phosphatase 166 (H) 38 - 126 U/L    AST (SGOT) 120 (H) 14 - 74 U/L    ALT (SGPT) 27 3 - 52 U/L   LACTIC ACID    Collection Time: 01/07/22  2:00 PM   Result Value Ref Range    Lactic acid 2.4 (HH) 0.5 - 2.0 mmol/L   MAGNESIUM    Collection Time: 01/07/22  2:00 PM   Result Value Ref Range    Magnesium 2.9 (H) 1.7 - 2.8 mg/dL   PROCALCITONIN    Collection Time: 01/07/22  2:00 PM   Result Value Ref Range    Procalcitonin 6.65 (HH) 0.5 - 2.0 ng/mL       Radiologic Studies -   [unfilled]  CT Results  (Last 48 hours)               01/07/22 1613  CT HEAD WO CONT Final result    Impression:      No acute intracranial abnormality. Narrative:  EXAM: CT head       INDICATION: Altered mental status       COMPARISON: 10/7/2021       TECHNIQUE: Axial CT imaging of the head was performed without intravenous   contrast. Standard multiplanar coronal and sagittal reformatted images were   obtained and are included in interpretation. One or more dose reduction techniques were used on this CT: automated exposure   control, adjustment of the mAs and/or kVp according to patient size, and   iterative reconstruction techniques.   The specific techniques used on this CT   exam have been documented in the patient's electronic medical record. Digital   Imaging and Communications in Medicine (DICOM) format image data are available   to nonaffiliated external healthcare facilities or entities on a secure, media   free, reciprocally searchable basis with patient authorization for at least a   12-month period after this study. _______________       FINDINGS:       BRAIN AND POSTERIOR FOSSA: Patchy periventricular, deep and subcortical white   matter hypoattenuation which is nonspecific but likely represents chronic   ischemic changes. No evidence of acute large vessel transcortical infarct or   acute parenchymal hemorrhage. No midline shift or hydrocephalus. EXTRA-AXIAL SPACES AND MENINGES: There are no abnormal extra-axial fluid   collections. CALVARIUM: Intact. SINUSES: Clear. OTHER: None.       _______________           01/07/22 1613  CT CHEST WO CONT Final result    Impression:          1. Asymmetrically distributed multifocal groundglass and interstitial   infiltrates compatible with Covid pneumonia.      > No evidence of pneumomediastinum, pneumothorax, or pleural effusion. 2. Marked hepatic steatosis. Narrative:  EXAM: CT Chest        INDICATION: Covid pneumonia and shortness of breath. COMPARISON: None. TECHNIQUE: Axial CT imaging from the thoracic inlet through the diaphragm   without intravenous contrast. Multiplanar reformations were generated. One or more dose reduction techniques were used on this CT: automated exposure   control, adjustment of the mAs and/or kVp according to patient size, and   iterative reconstruction techniques. The specific techniques used on this CT   exam have been documented in the patient's electronic medical record.   Digital   Imaging and Communications in Medicine (DICOM) format image data are available   to nonaffiliated external healthcare facilities or entities on a secure, media   free, reciprocally searchable basis with patient authorization for at least a   12-month period after this study. _______________       FINDINGS:       LUNGS:      > Multifocal groundglass opacities with accompanying interlobular septal line   thickening, asymmetrically involving the left upper lobe, lingula, and superior   segment left lower lobe. Additional involvement of the paramedian right upper   lobe and right middle lobe medial segment. PLEURA: Unremarkable. AIRWAY: Unremarkable. MEDIASTINUM: Normal cardiac size. Thoracic aorta normal in course and caliber. LYMPH NODES: No enlarged lymph nodes by size criteria. UPPER ABDOMEN: Advanced hepatic steatosis. Partial inclusion postop changes   prior gastric bypass surgical procedure. OSSEOUS STRUCTURES: Indwelling right shoulder arthroplasty. Chronic fracture   deformity proximal left humerus. OTHER:       _______________               CXR Results  (Last 48 hours)               01/07/22 1231  XR CHEST PORT Final result    Impression:      Patchy asymmetric areas of subtle interstitial infiltrate, new from prior exam   without superimposed pleural abnormality. Narrative:  EXAM: XR CHEST PORT       CLINICAL INDICATION/HISTORY: COVID positive short of breath   -Additional: None       COMPARISON: 12/30/2021       TECHNIQUE: Frontal view of the chest       _______________       FINDINGS:       HEART AND MEDIASTINUM: Normal cardiac size and mediastinal contours. LUNGS AND PLEURAL SPACES: Faint mid to lower lung zone opacities are noted, left   greater than right without evidence of pneumothorax or pleural effusion. No   dense alveolar consolidation. BONY THORAX AND SOFT TISSUES: Right shoulder arthroplasty. _______________                 Medical Decision Making and ED Course   - I am the first and primary provider for this patient AND AM THE PRIMARY PROVIDER OF RECORD.     - I reviewed the vital signs, available nursing notes, past medical history, past surgical history, family history and social history. - Initial assessment performed. The patients presenting problems have been discussed, and the staff are in agreement with the care plan formulated and outlined with them. I have encouraged them to ask questions as they arise throughout their visit. Vital Signs-Reviewed the patient's vital signs. Patient Vitals for the past 12 hrs:   Temp Pulse Resp BP SpO2   01/07/22 1216 98.1 °F (36.7 °C) 100 18 95/62 93 %       EKG interpretation: (Preliminary): Performed at 1316  Rhythm: sinus tachycardia; and regular . Rate (approx.): 103; Axis: left axis deviation; OH interval: normal; QRS interval: normal ; ST/T wave: non-specific changes; Other findings: QTc 468. LASH; LVH; no acute ST changes. Records Reviewed: Nursing Notes    ED Course:   5:22 PM  Discussed with NP Gera; will admit  covid positive with pneumonia and hypoxia;sepsis             Consultations:       Consultations:        Procedures and Critical Care           Disposition     Disposition: Admit      Diagnosis     Clinical Impression:   1. Pneumonia due to COVID-19 virus    2. Hypoxia    3. Sepsis, due to unspecified organism, unspecified whether acute organ dysfunction present [facility])        Attestations:    Sy Crowley MD    Please note that this dictation was completed with SMATOOS, the computer voice recognition software. Quite often unanticipated grammatical, syntax, homophones, and other interpretive errors are inadvertently transcribed by the computer software. Please disregard these errors. Please excuse any errors that have escaped final proofreading. Thank you.

## 2022-01-08 NOTE — H&P
History and Physical    Subjective:     Janay Byrne is a 77 y.o. female with a past medical history significant for COVID-19 infection, morbid obesity, HTN, HLP, hypothyroidism, Right AKA, and CHF who was sent over to the ED today from [address] with complaints of altered mental status and low oxygen saturation. At the time of my evaluation in the ED, patient appears lethargic, and not able to contribute to history, hence I am unable to obtain ROS. ED work-up shows a white count of 17, chest x-ray shows patchy asymmetric areas of subpleural interstitial infiltrates, new from prior exam.  Noncontrast chest CT suggests asymmetrically distributed multifocal groundglass and interstitial infiltrates compatible with Covid pneumonia. Rapid COVID-19 test was positive. lactic was 2.4. Procalcitonin 6.65. Hospitalist was asked to admit. Past Medical History:   Diagnosis Date    Bipolar 1 disorder, depressed ([address]     CHF (congestive heart failure) (HCC)     Chronic neck and back pain     Frequent UTI     Hypercholesteremia     Hypertension     Obese     Psychiatric disorder       Past Surgical History:   Procedure Laterality Date    HX ABOVE KNEE AMPUTATION      right    HX CERVICAL FUSION      HX CHOLECYSTECTOMY      HX GASTRIC BYPASS      HX HYSTERECTOMY      HX ORTHOPAEDIC      neck surgery    HX PARTIAL THYROIDECTOMY       History reviewed. No pertinent family history. Social History     Tobacco Use    Smoking status: Never Smoker    Smokeless tobacco: Never Used   Substance Use Topics    Alcohol use: Not Currently     Comment: rare       Prior to Admission medications    Medication Sig Start Date End Date Taking? Authorizing Provider   trimethoprim-sulfamethoxazole (Bactrim DS) 160-800 mg per tablet Take 1 Tablet by mouth two (2) times a day for 7 days.  12/30/21 1/6/22  Jerry Delgado MD   amoxicillin-clavulanate (AUGMENTIN) 875-125 mg per tablet  12/16/21 Other, MD Mariama   busPIRone (BUSPAR) 15 mg tablet  12/10/21   Other, MD Mariama   traZODone (DESYREL) 150 mg tablet  12/16/21   Other, MD Mariama   ARIPiprazole (ABILIFY) 10 mg tablet  9/13/21   Provider, Historical   HYDROcodone-acetaminophen (NORCO)  mg tablet  9/20/21   Provider, Historical   HYDROcodone-acetaminophen (NORCO) 7.5-325 mg per tablet  9/20/21   Provider, Historical   ondansetron hcl (ZOFRAN) 4 mg tablet  9/2/21   Provider, Historical   pantoprazole (PROTONIX) 40 mg tablet  9/19/21   Provider, Historical   gabapentin (NEURONTIN) 400 mg capsule  9/19/21   Provider, Historical   divalproex DR (DEPAKOTE) 500 mg tablet Take 1 Tablet by mouth nightly. 8/9/21   Alina Moeller MD   trihexyphenidyL (ARTANE) 2 mg tablet TAKE 1 TABLET BY MOUTH EVERY DAY 7/16/21   Geraldine Purvis NP   furosemide (LASIX) 40 mg tablet Take 1 Tablet by mouth daily. Indications: Please hold lasix x 3 days, then resume at 40mg daily, which is half of what you were reportedly taking prior to admission. 7/14/21   Gray Orellana MD   polyethylene glycol [facility]) 17 gram packet Take 1 Packet by mouth daily. 7/14/21   Gray Orellana MD   acetaminophen (TYLENOL) 325 mg tablet Take 2 Tablets by mouth every six (6) hours as needed for Pain. 7/14/21   Gray Orellana MD   levothyroxine (SYNTHROID) 200 mcg tablet TAKE 1 TABLET EVERY DAY 6/22/21   Alina Moeller MD   gabapentin (NEURONTIN) 300 mg capsule Take 1 Capsule by mouth three (3) times daily. Max Daily Amount: 900 mg. 6/22/21   Alina Moeller MD   amiodarone (CORDARONE) 200 mg tablet TAKE 1 TABLET EVERY DAY 6/17/21   Alina Moeller MD   FeroSuL 325 mg (65 mg iron) tablet Take 1 Tab by mouth daily. 2/24/21   Provider, Historical   ibuprofen (MOTRIN) 600 mg tablet Take 1 Tab by mouth every eight (8) hours as needed for Pain. 5/18/21   Alina Moeller MD   multivitamin (MULTIPLE VITAMIN PO) Take 1 Tab by mouth daily.     Provider, Historical Eliquis 2.5 mg tablet TAKE 1 TABLET TWICE DAILY 12/16/20   Sanya Moeller., MD   fexofenadine (Allegra [facility]) 180 mg tablet Take 1 Tab by mouth daily. OtherMariama MD   omega-3 fatty acids (Fish Oil Concentrate) 1,000 mg cap Take 1 Tab by mouth daily. Mariama Saini MD   magnesium oxide (MAG-OX) 400 mg tablet Take 1 Tablet by mouth two (2) times a day. Marimaa Saini MD   b complex-vitamin c-folic acid 0.8 mg (Isabel-Seth) 0.8 mg tab tablet Take 1 Tab by mouth daily. Mariama Saini MD   cranberry extract 425 mg cap Take 425 mg by mouth two (2) times a day. Mariama Saini MD   baclofen (LIORESAL) 10 mg tablet TAKE 1 TABLET THREE TIMES DAILY 9/23/20   Sanya Moeller., MD     Allergies   Allergen Reactions    Succinylcholine Chloride Unknown (comments)     Other reaction(s): Other (See Comments)  Chest pain radiating into neck    Codeine Unknown (comments)     Other reaction(s): Other (See Comments)  Abdominal pain unless \"synthetic Codeine\"    Hydromorphone Unknown (comments)     Patient states it makes her not stop talking, jittery      Hydromorphone (Bulk) Other (comments)     Makes crazy      Prednisone Unknown (comments)     Other reaction(s): Other (See Comments)    \"It turns me into a witch,makes me mean\"    Topiramate Other (comments)        Review of Systems:  Unable to obtain ROS due to acuity of patient's condition. Objective:     Vitals:  Visit Vitals  BP (!) 142/74   Pulse (!) 120   Temp 98.1 °F (36.7 °C)   Resp 20   Ht 5' 9\" (1.753 m)   Wt 112.9 kg (249 lb)   SpO2 92%   BMI 36.77 kg/m²       Physical Exam:  General: Ill-appearing, morbidly obese, elderly female who appears lethargic, however in no respiratory distress. She is on room air. Head:  Normocephalic, without obvious abnormality, atraumatic. Eyes:  Conjunctivae/corneas clear. Pupils equal, round, reactive to light. Extraocular movements intact.   Lungs: Diminished to auscultation bilaterally, no wheezes, or crackles  Chest wall: No tenderness or deformity. Heart:  Regular rate and rhythm, S1, S2 normal, no murmur, click, rub, or gallop. Abdomen: Soft, non-tender. Bowel sounds active. No palpable masses. Extremities: Right above-the-knee amputation, 3+ pitting LLE extremity edema. Bilateral edema, somewhat +ve anasarca. No cyanosis. Pulses: 1+ in available extremites  Skin: Overall skin condition is pale, and shiny. Multiple ecchymoses in bilateral arms. Gross generalized edema. Neurologic: Lethargic.     Labs:  Recent Results (from the past 24 hour(s))   BLOOD GAS, ARTERIAL    Collection Time: 01/07/22 12:15 PM   Result Value Ref Range    pH 7.50 (H) 7.37 - 7.43      PCO2 33 (L) 35.0 - 45.0 mmHg    PO2 55 (L) 84 - 98 mmHg    O2 SAT 90 (L) 94 - 100 %    BICARBONATE 25 23.0 - 27.0 mmol/L    BASE EXCESS 2.6 (H) 0.0 - 2.5 mmol/L    O2 METHOD Room air      FIO2 21.0 %    Sample source Arterial      SITE Left Radial      TAHMINA'S TEST PASS      Critical value read back CALLED MD     Carboxy-Hgb 1.0 0 - 3 %    Methemoglobin 0.3 0 - 3 %    tHb 12.8 12.0 - 16.0 g/dL    Oxyhemoglobin 88.7 (LL) 90 - 100 %   AMMONIA    Collection Time: 01/07/22  1:00 PM   Result Value Ref Range    Ammonia 61 (H) 9 - 33 umol/L   EKG, 12 LEAD, INITIAL    Collection Time: 01/07/22  1:16 PM   Result Value Ref Range    Ventricular Rate 103 BPM    Atrial Rate 103 BPM    P-R Interval 155 ms    QRS Duration 115 ms    Q-T Interval 357 ms    QTC Calculation (Bezet) 468 ms    Calculated P Axis 77 degrees    Calculated R Axis -47 degrees    Calculated T Axis 128 degrees    Diagnosis       Sinus tachycardia  Incomplete left bundle branch block  LVH with secondary repolarization abnormality    Confirmed by MERCEDES Villa ([id number]) on 1/7/2022 3:54:59 PM     CBC WITH AUTOMATED DIFF    Collection Time: 01/07/22  2:00 PM   Result Value Ref Range    WBC 17.1 (H) 4.6 - 13.2 K/uL    RBC 3.49 (L) 4.20 - 5.30 M/uL    HGB 12.2 12.0 - 16.0 g/dL    HCT 38.5 35.0 - 45.0 %    .3 (H) 78.0 - 100.0 FL    MCH 35.0 (H) 24.0 - 34.0 PG    MCHC 31.7 31.0 - 37.0 g/dL    RDW 18.0 (H) 11.6 - 14.5 %    PLATELET 533 643 - 034 K/uL    MPV 10.9 9.2 - 11.8 FL    NRBC 0.0 0.0  WBC    ABSOLUTE NRBC 0.00 0.00 - 0.01 K/uL    NEUTROPHILS 75 (H) 40 - 73 %    LYMPHOCYTES 18 (L) 21 - 52 %    MONOCYTES 6 3 - 10 %    EOSINOPHILS 0 0 - 5 %    BASOPHILS 0 0 - 2 %    IMMATURE GRANULOCYTES 1 (H) 0 - 0.5 %    ABS. NEUTROPHILS 12.8 (H) 1.8 - 8.0 K/UL    ABS. LYMPHOCYTES 3.1 0.9 - 3.6 K/UL    ABS. MONOCYTES 1.0 0.05 - 1.2 K/UL    ABS. EOSINOPHILS 0.0 0.0 - 0.4 K/UL    ABS. BASOPHILS 0.0 0.0 - 0.1 K/UL    ABS. IMM. GRANS. 0.2 (H) 0.00 - 0.04 K/UL    DF AUTOMATED      RBC COMMENTS Macrocytosis  1+        RBC COMMENTS Rouleaux  1+       METABOLIC PANEL, BASIC    Collection Time: 01/07/22  2:00 PM   Result Value Ref Range    Sodium 141 135 - 145 mmol/L    Potassium 5.3 (H) 3.2 - 5.1 mmol/L    Chloride 103 94 - 111 mmol/L    CO2 27 21 - 33 mmol/L    Anion gap 11 mmol/L    Glucose 95 70 - 110 mg/dL    BUN 26 (H) 9 - 21 mg/dL    Creatinine 2.20 (H) 0.70 - 1.20 mg/dL    BUN/Creatinine ratio 12      GFR est AA 26 ml/min/1.73m2    GFR est non-AA 22 ml/min/1.73m2    Calcium 7.9 (L) 8.5 - 10.5 mg/dL   D DIMER    Collection Time: 01/07/22  2:00 PM   Result Value Ref Range    D DIMER 0.32 <0.46 ug/ml(FEU)   TROPONIN I    Collection Time: 01/07/22  2:00 PM   Result Value Ref Range    Troponin-I, Qt. 0.04 0.02 - 0.05 ng/mL   BNP    Collection Time: 01/07/22  2:00 PM   Result Value Ref Range    BNP 41 0 - 100 pg/mL   HEPATIC FUNCTION PANEL    Collection Time: 01/07/22  2:00 PM   Result Value Ref Range    Protein, total 5.2 (L) 6.1 - 8.4 g/dL    Albumin 1.9 (L) 3.5 - 4.7 g/dL    Globulin 3.3 g/dL    A-G Ratio 0.6      Bilirubin, total 1.7 (H) 0.2 - 1.0 mg/dL    Bilirubin, direct 0.8 (H) 0.0 - 0.3 mg/dL    Alk.  phosphatase 166 (H) 38 - 126 U/L    AST (SGOT) 120 (H) 14 - 74 U/L    ALT (SGPT) 27 3 - 52 U/L   LACTIC ACID    Collection Time: 01/07/22  2:00 PM   Result Value Ref Range    Lactic acid 2.4 (HH) 0.5 - 2.0 mmol/L   MAGNESIUM    Collection Time: 01/07/22  2:00 PM   Result Value Ref Range    Magnesium 2.9 (H) 1.7 - 2.8 mg/dL   PROCALCITONIN    Collection Time: 01/07/22  2:00 PM   Result Value Ref Range    Procalcitonin 6.65 (HH) 0.5 - 2.0 ng/mL   LACTIC ACID    Collection Time: 01/07/22  5:30 PM   Result Value Ref Range    Lactic acid  0.5 - 2.0 mmol/L     CALLED ER AT 1911 NO ANSWER PCO. SPECIMEN APPEARS MARKEDLY HEMOLYZED   CULTURE, BLOOD    Collection Time: 01/07/22  5:44 PM    Specimen: Blood   Result Value Ref Range    Special Requests: No Special Requests      Culture result: PENDING    COVID-19 RAPID TEST    Collection Time: 01/07/22  5:45 PM   Result Value Ref Range    Specimen source Nasopharyngeal      COVID-19 rapid test DETECTED (A) Not Detected         Imaging:  CT HEAD WO CONT    Result Date: 1/7/2022  EXAM: CT head INDICATION: Altered mental status COMPARISON: 10/7/2021 TECHNIQUE: Axial CT imaging of the head was performed without intravenous contrast. Standard multiplanar coronal and sagittal reformatted images were obtained and are included in interpretation. One or more dose reduction techniques were used on this CT: automated exposure control, adjustment of the mAs and/or kVp according to patient size, and iterative reconstruction techniques. The specific techniques used on this CT exam have been documented in the patient's electronic medical record. Digital Imaging and Communications in Medicine (DICOM) format image data are available to nonaffiliated external healthcare facilities or entities on a secure, media free, reciprocally searchable basis with patient authorization for at least a 12-month period after this study.  _______________ FINDINGS: BRAIN AND POSTERIOR FOSSA: Patchy periventricular, deep and subcortical white matter hypoattenuation which is nonspecific but likely represents chronic ischemic changes. No evidence of acute large vessel transcortical infarct or acute parenchymal hemorrhage. No midline shift or hydrocephalus. EXTRA-AXIAL SPACES AND MENINGES: There are no abnormal extra-axial fluid collections. CALVARIUM: Intact. SINUSES: Clear. OTHER: None. _______________     No acute intracranial abnormality. CT CHEST WO CONT    Result Date: 1/7/2022  EXAM: CT Chest INDICATION: Covid pneumonia and shortness of breath. COMPARISON: None. TECHNIQUE: Axial CT imaging from the thoracic inlet through the diaphragm without intravenous contrast. Multiplanar reformations were generated. One or more dose reduction techniques were used on this CT: automated exposure control, adjustment of the mAs and/or kVp according to patient size, and iterative reconstruction techniques. The specific techniques used on this CT exam have been documented in the patient's electronic medical record. Digital Imaging and Communications in Medicine (DICOM) format image data are available to nonaffiliated external healthcare facilities or entities on a secure, media free, reciprocally searchable basis with patient authorization for at least a 12-month period after this study. _______________ FINDINGS: LUNGS:   > Multifocal groundglass opacities with accompanying interlobular septal line thickening, asymmetrically involving the left upper lobe, lingula, and superior segment left lower lobe. Additional involvement of the paramedian right upper lobe and right middle lobe medial segment. PLEURA: Unremarkable. AIRWAY: Unremarkable. MEDIASTINUM: Normal cardiac size. Thoracic aorta normal in course and caliber. LYMPH NODES: No enlarged lymph nodes by size criteria. UPPER ABDOMEN: Advanced hepatic steatosis. Partial inclusion postop changes prior gastric bypass surgical procedure. OSSEOUS STRUCTURES: Indwelling right shoulder arthroplasty. Chronic fracture deformity proximal left humerus. OTHER: _______________     1. Asymmetrically distributed multifocal groundglass and interstitial infiltrates compatible with Covid pneumonia.   > No evidence of pneumomediastinum, pneumothorax, or pleural effusion. 2. Marked hepatic steatosis. XR CHEST PORT    Result Date: 1/7/2022  EXAM: XR CHEST PORT CLINICAL INDICATION/HISTORY: COVID positive short of breath -Additional: None COMPARISON: 12/30/2021 TECHNIQUE: Frontal view of the chest _______________ FINDINGS: HEART AND MEDIASTINUM: Normal cardiac size and mediastinal contours. LUNGS AND PLEURAL SPACES: Faint mid to lower lung zone opacities are noted, left greater than right without evidence of pneumothorax or pleural effusion. No dense alveolar consolidation. BONY THORAX AND SOFT TISSUES: Right shoulder arthroplasty. _______________     Patchy asymmetric areas of subtle interstitial infiltrate, new from prior exam without superimposed pleural abnormality. Assessment & Plan:     #1: Sepsis:  -with 2 sirs criteria wbc 17, hr 120s. - suspect 2/2 COVID-19 pneumonia  -Noncontrast chest CT suggest asymmetrically distributed multifocal groundglass and interstitial infiltrates compatible with Covid pneumonia. -rapid covid is +ve. her lactic is 2.4. Procalcitonin is 6.65. UA is pending. .  -she got levaquin in ED, + sepsis fluid bolus  -admit to inpatient, monitor on telemetry.  -cont abx with Ceftriaxne for possible superimposed bact infection, otherwise supportive care for covid pneumonia, she is on room air.  -repeat/trend lactic   -f/up blood and urine cultures. -CBC, BMP daily. #2: Acute kidney injury:  -Cr 2.2 her baseline Cr level ~1.2  -continue [email or web address]. -BMP daily    #3: Hyperkalemia:  -K level of 5.3, mild. Give kayexelate x 1.  -repeat BMP in a.m. #4: Hypertension:  -chronic, fairly stable. Not currently on antihypertensives. Monitor. #5: Hypothyroidism:  -chronic, continue levothyroxine. #6: Chronic anticoagulation with Eliquis, continue.     VTE prophylaxis: Eliquis. CODE STATUS: DNR/DNI-paperwork sent with patient from [facility]. Total time spent: 45 minutes. Plan of care discussed with patient, and bedside RN.

## 2022-01-08 NOTE — WOUND CARE
Asked to consult on this newly admitted, 78 y/o female patient's wounds present on admission. The patient is a resident of [address] [place] [address] and has been admitted with a diagnosis of Hypoxia and is positive for COVID-19. She is bedbound with an extensive health history including morbid obsesity, right AKA, and CHF. The patient was assessed along with the primary nurse. There were multiple wounds and skin conditions noted. Pictures and descriptions are as follows:        Stage II Sacral Ulcer - The wound is pink-red in appearance with scant serosanguineous drainage noted. No odor noted. Measurements are 3.5 cm x 0.4 cm x 0 cm. Of note, there are linear areas in the area which appear to be deep tissue injuries and pressure related. There are no obvious signs of infection at this time. The wound was cleansed with wound cleanser spray, 4x4 gauze, and gently patted dry. An appropriated sized Optifoam border dressing was applied over the wound. Nursing orders will be entered to continue this dressing change every 3 days and to turn & reposition the patient at least every 2 hours. Offloading pressure will be of high priority in preventing the wound and skin in this area from worsening. Right Arm - Sparse bruising with edema and moderate weeping of serous fluid through the skin. No open area noted, however there is evidence of scarring from previous skin tears. Left Arm - Similar in appearance to the right arm, however bruising and edema more prevalent. There are 3 open skin tears measuring from top to bottom of picture: 1.6 cm x 2.5 cm x 0 cm,  0.2 cm x 1.4 cm x 0 cm, and 0.4 cm x 1 cm x 0 cm. The wounds were cleansed thoroughly with wound cleanser spray, 4x4 gauze, and gently patted dry. Black, dried blood was removed from the largest wound with a small amount of sanguineous drainage following. The wounds appear healing with good capillary budding.  A layer of Xeroform was placed over the wounds followed by a non-adherent dressing. This was then secured around the extremity with Bulkee II gauze and tape. Nursing orders will be entered to continue this dressing change daily. Groin - Reddened, excoriated, peeling skin noted in the groin area. This appears to be the result of candidiasis. Perineal care was performed and the primary care nurse was asked to apply anti fungal cream to the area during incontinent care. Right Abdominal Fold - Similar to the groin area, reddened, excoriated, peeling skin noted in the abdominal folds likely caused by candidiasis. There is a small opening in the skin along the linear area. Just as with the groin area, the primary care nurse was asked to apply anti fungal cream to the area during incontinent care. If no improvement within several days, would recommend consulting the provider for orders to possibly obtain nystatin for the affected areas. As always, please notify the provider of worsening wounds and skin conditions for additional orders and treatment.     Fatou Garcia RN, [facility]

## 2022-01-08 NOTE — PROGRESS NOTES
Provider Blanche Collet NP notified of PTA med list medications not ordered at admission. Orders placed.

## 2022-01-08 NOTE — PROGRESS NOTES
Hospitalist Progress Note             Date of Service:  2022  NAME:  Kathye Epley  :    MRN:  [id number]    Assessment & Plan:      #1: Sepsis:  -with 2 sirs criteria wbc 17, hr 120s. - suspect 2/2 COVID-19 pneumonia  -Noncontrast chest CT suggest asymmetrically distributed multifocal groundglass and interstitial infiltrates compatible with Covid pneumonia. -rapid covid is +ve. her lactic was 2.4 now improved. Procalcitonin is 6.65  - ua positive for uti  -she got levaquin in ED, + sepsis fluid bolus  -admit to inpatient, monitor on telemetry.  -cont abx with Ceftriaxne for possible superimposed bact infection, otherwise supportive care for covid pneumonia, she is on room air.  -repeat/trend lactic   -f/up blood and urine cultures. -CBC, BMP daily. #2: Acute kidney injury:- worsening  -Cr 2.5 her baseline Cr level ~1.2  -continue [email or web address]. -BMP daily     #3: Hyperkalemia:  -K level of 5.3, mild. Give kayexelate x 1.  -improved to 3.9     #4: Hypertension:  -chronic, fairly stable. Not currently on antihypertensives. Monitor.     #5: Hypothyroidism:  -chronic, continue levothyroxine.     #6: Chronic anticoagulation with Eliquis, continue.     VTE prophylaxis: Eliquis. Hospital Problems  Date Reviewed: 2021          Codes Class Noted POA    Hypoxia ICD-10-CM: R09.02  ICD-9-CM: 799.02  2022 Unknown        Sepsis ([facility] [address] ICD-10-CM: A41.9  ICD-9-CM: 038.9, 995.91  2022 Unknown        Pneumonia due to COVID-19 virus ICD-10-CM: U07.1, J12.82  ICD-9-CM: 480.8, 079.89  2022 Unknown                Review of Systems:   Review of systems not obtained due to patient factors. Vital Signs:    Last 24hrs VS reviewed since prior progress note.  Most recent are:  Visit Vitals  BP (!) 159/79 (BP 1 Location: Left upper arm, BP Patient Position: At rest)   Pulse 91   Temp 97.8 °F (36.6 °C)   Resp 17   Ht 5' 9\" (1.753 m)   Wt 117.4 kg (258 lb 14.4 oz)   SpO2 92%   BMI 38.23 kg/m²       No intake or output data in the 24 hours ending 01/08/22 1218     Physical Examination:             General:          lethargic, arousible  HEENT:           Atraumatic, anicteric sclerae, pink conjunctivae                          No oral ulcers, mucosa moist, throat clear, dentition fair  Neck:               Supple, symmetrical  Lungs:             Clear to auscultation bilaterally. No Wheezing or Rhonchi. No rales. Chest wall:      No tenderness  No Accessory muscle use. Heart:              Regular  rhythm,  No  murmur   No edema  Abdomen:        Soft, non-tender. Not distended. Bowel sounds normal  Extremities:     r aka, + edema 2+  Skin:                Not pale. Not Jaundiced  No rashes   Psych:             Not anxious or agitated. Neurologic:      Alert, moves all extremities, answers questions appropriately and responds to commands        Data Review:    Review and/or order of clinical lab test  Review and/or order of tests in the radiology section of CPT  Review and/or order of tests in the medicine section of CPT      Labs:     Recent Labs     01/08/22  0840 01/07/22  1400   WBC 13.6* 17.1*   HGB 10.4* 12.2   HCT 32.7* 38.5    256     Recent Labs     01/08/22  0840 01/07/22  1400    141   K 3.9 5.3*    103   CO2 25 27   BUN 31* 26*   CREA 2.50* 2.20*   * 95   CA 7.4* 7.9*   MG  --  2.9*     Recent Labs     01/07/22  1400   ALT 27   *   TBILI 1.7*   TP 5.2*   ALB 1.9*   GLOB 3.3     No results for input(s): INR, PTP, APTT, INREXT in the last 72 hours. No results for input(s): FE, TIBC, PSAT, FERR in the last 72 hours.    No results found for: FOL, RBCF   Recent Labs     01/07/22  1215   PH 7.50*   PCO2 33*   PO2 55*     Recent Labs     01/07/22  1400   TROIQ 0.04     No results found for: CHOL, CHOLX, CHLST, CHOLV, HDL, HDLP, LDL, LDLC, DLDLP, TGLX, TRIGL, TRIGP, CHHD, [facility]  Lab Results   Component Value Date/Time    Glucose (POC) 90 07/08/2021 03:37 PM    Glucose (POC) 199 (H) 07/08/2021 10:55 AM     Lab Results   Component Value Date/Time    Color Dark Yellow 12/30/2021 12:40 PM    Appearance Turbid 12/30/2021 12:40 PM    Specific gravity 1.018 12/30/2021 12:40 PM    pH (UA) 5.5 12/30/2021 12:40 PM    Protein Trace (A) 12/30/2021 12:40 PM    Glucose Negative 12/30/2021 12:40 PM    Ketone Trace (A) 12/30/2021 12:40 PM    Bilirubin Small (A) 12/30/2021 12:40 PM    Urobilinogen 1.0 12/30/2021 12:40 PM    Nitrites Negative 12/30/2021 12:40 PM    Leukocyte Esterase Large (A) 12/30/2021 12:40 PM    Epithelial cells Few 12/30/2021 12:40 PM    Bacteria 1+ (A) 12/30/2021 12:40 PM    WBC >100 (H) 12/30/2021 12:40 PM    RBC 10-20 12/30/2021 12:40 PM         Medications Reviewed:     Current Facility-Administered Medications   Medication Dose Route Frequency    nystatin (MYCOSTATIN) 100,000 unit/gram powder   Topical BID    traZODone (DESYREL) tablet 150 mg  150 mg Oral QHS    gabapentin (NEURONTIN) capsule 400 mg  400 mg Oral TID    busPIRone (BUSPAR) tablet 15 mg  15 mg Oral TID    guaiFENesin (ROBITUSSIN) 100 mg/5 mL oral liquid 100 mg  100 mg Oral Q6H PRN    sodium chloride (NS) flush 5-10 mL  5-10 mL IntraVENous PRN    amiodarone (CORDARONE) tablet 200 mg  200 mg Oral DAILY    B complex-vitamin C-folic acid (NEPHRO-RISHABH) 0.8 mg tab  1 Tablet Oral DAILY    cranberry extract tablet 450 mg- PT MUST BRING FROM HOME (Patient Supplied)  1 Tablet Oral DAILY    apixaban (ELIQUIS) tablet 2.5 mg  2.5 mg Oral BID    ferrous sulfate tablet 325 mg  1 Tablet Oral DAILY    cetirizine (ZYRTEC) tablet 10 mg  10 mg Oral DAILY    levothyroxine (SYNTHROID) tablet 200 mcg  200 mcg Oral ACB    magnesium oxide (MAG-OX) tablet 400 mg  400 mg Oral BID    omega 3-DHA-EPA-fish oil 1,000 mg (120 mg-180 mg) capsule 1 Capsule- PT MUST BRING FROM HOME (Patient Supplied)  1 Capsule Oral DAILY    pantoprazole (PROTONIX) tablet 40 mg  40 mg Oral ACB    therapeutic multivitamin (THERAGRAN) tablet 1 Tablet  1 Tablet Oral DAILY     ______________________________________________________________________  EXPECTED LENGTH OF STAY: - - -  ACTUAL LENGTH OF STAY:          1                 Cary Mittal MD

## 2022-01-08 NOTE — PROGRESS NOTES
Perineal care provided for incontinence of large, loose, dark stool. Patient turned and repositioned. Absorbant pads changed under extremities. Fungal cream applied to vagina and creases of thighs.

## 2022-01-09 NOTE — PROGRESS NOTES
Pt had a run of [facility] with HRof 169, assessed pt she was asymptomatic, no voiced or obvious concerns at this time.

## 2022-01-09 NOTE — PROGRESS NOTES
Pharmacist Note - Vancomycin Dosing    Consult provided for this 77 y.o. female for indication of BACTEREMIA. Antibiotic regimen(s): ZOSYN 3.375G IV Q 12 H FOR 7 DAYS (RENALLY ADJUSTED FOR CRCL 23)  Patient on vancomycin PTA? NO     Recent Labs     22  0345 22  0840 22  1400   WBC 23.1* 13.6* 17.1*   CREA 2.80* 2.50* 2.20*   BUN 35* 31* 26*     Frequency of BMP: DAILY  Height: 175 cm  Weight: 117 kg  Est CrCl: 23 ml/min; Temp (24hrs), Av.3 °F (36.8 °C), Min:97.9 °F (36.6 °C), Max:99 °F (37.2 °C)    Cultures:  BLOOD AND URINE CULTURE    Goal trough = 15 - 20 mcg/mL    Therapy will be initiated with a loading dose of 1250 mg IV x 1 to be followed by a maintenance dose of 750 mg IV every 24 hours. Pharmacy to follow patient daily and order levels / make dose adjustments as appropriate. WILL START MAINTENANCE DOSE OF 750MG IV 12 HOURS AFTER LOADING DOSE TO GET TO STEADY STATE QUICKER.

## 2022-01-09 NOTE — PROGRESS NOTES
Changed pt's dressing on left arm, changed brief of medium sized BM, 50 mL emptied from [place], chucks pads changed under weeping areas, scheduled medications given with pudding, pt wanted water but not to eat dinner. Changed sheet and blanket, oral care provided.

## 2022-01-09 NOTE — PROGRESS NOTES
Problem: Falls - Risk of  Goal: *Absence of Falls  Description: Document Kylah Neigh Fall Risk and appropriate interventions in the flowsheet. Outcome: Progressing Towards Goal  Note: Fall Risk Interventions:       Mentation Interventions: Adequate sleep, hydration, pain control,Reorient patient,Update white board    Medication Interventions: Bed/chair exit alarm    Elimination Interventions: Call light in reach              Problem: Patient Education: Go to Patient Education Activity  Goal: Patient/Family Education  Outcome: Progressing Towards Goal     Problem: Airway Clearance - Ineffective  Goal: Achieve or maintain patent airway  Outcome: Progressing Towards Goal     Problem: Gas Exchange - Impaired  Goal: Absence of hypoxia  Outcome: Progressing Towards Goal  Goal: Promote optimal lung function  Outcome: Progressing Towards Goal     Problem: Breathing Pattern - Ineffective  Goal: Ability to achieve and maintain a regular respiratory rate  Outcome: Progressing Towards Goal     Problem:  Body Temperature -  Risk of, Imbalanced  Goal: Ability to maintain a body temperature within defined limits  Outcome: Progressing Towards Goal  Goal: Will regain or maintain usual level of consciousness  Outcome: Progressing Towards Goal  Goal: Complications related to the disease process, condition or treatment will be avoided or minimized  Outcome: Progressing Towards Goal     Problem: Isolation Precautions - Risk of Spread of Infection  Goal: Prevent transmission of infectious organism to others  Outcome: Progressing Towards Goal     Problem: Nutrition Deficits  Goal: Optimize nutrtional status  Outcome: Progressing Towards Goal

## 2022-01-09 NOTE — PROGRESS NOTES
1900 - Assumed care of pt, shift report given    2000 - Informed by telemetry tech and nursing supervisor that pt appeared to be in SVT, when this nurse and charge nurse walked into room pt resting quietly in bed and had converted back into sinus tach. Hospitalist at nursing station and aware of situation. 2039 - BP 87/42 with a recheck of 92/46, hospitalist outside of room and made aware. Per hospitalist, monitor closely. Unable to give pt IVF r/t generalized 3+ edema. 5 - Hospitalist made aware that lab was unable to obtain STAT MAG, per hospitalist obtain with morning labs. 5001 RADHA Borjas made aware of pt's current condition. Pt was 78-83% on 4L, she is now on 6L NC and spo2 will range from 90-94%     0604 - Pt had a minute run of svt RATE , PT is resting in bed with eyes closed, NAD noted. Hospitalist made aware     0812 - Hospitalist in to see pt.  Moning medication given

## 2022-01-09 NOTE — PROGRESS NOTES
Hospitalist Progress Note     INTERNAL MEDICINE PROGRESS NOTE  Patient: Briana Matt   YOB: 1945   MRN: [id number]      Hospital course / Assessment    Principle Problems:  Sepsis ,   Bacteremia   Covid 19 Pneumonia   Urinary tract infection  Acute hypoxic respiratory failure   Anasarca   - suspect 2/2 COVID-19 pneumonia and Bacteremia, -180, leukocytosis, Hypoxia   -Noncontrast chest CT suggest asymmetrically distributed multifocal groundglass and interstitial infiltrates compatible with Covid pneumonia. -rapid covid Positive. , elevated lactic,  Procalcitonin is 6.65, UA showed UTI,   -admit to inpatient, monitor on telemetry, IVF support, Empirical antibiotic., repeat blood culture  - D/w Daughter - Radha Ryder-  And the plan to monitor her condition on antibiotic for next 24hr and if no response then the level of care will change to CMO   - Still require high flow O2  Acute kidney injury: worsening  -Cr 2.5 her baseline Cr level ~1.2, worsening, treated with IVF support and avoiding nephrotoxin   Hyperkalemia:  -K level of 5.3, mild. Give kayexelate x 1. Hypertension:  -chronic, fairly stable.  Not currently on antihypertensives.  Monitor. Hypothyroidism:  -chronic, continue levothyroxine. Chronic anticoagulation with Eliquis, continue.   H/O Right AKA      Code Status: Full code   DVT prophylaxis: pharmacologic and mechanical  Today Recommendation and Plan:   Continue with current medical supportive measures   Require high flow O2   D/c Daughter and the plan for another day of IV antibiotic and then CMO if no improvement     Disposition    Disposition: TBD     Subjective / ROS:   Patient not in or distress       Medical Decision Making   Chart, Images and Lab data reviewed, necessary medical Orders placed   Discussed with nursing staff     Vitals:    01/09/22 0528 01/09/22 0555 01/09/22 0830 01/09/22 0851   BP: (!) 97/41   (!) 147/55   Pulse: (!) 112   (!) 102   Resp: 22   18 Temp: 98.2 °F (36.8 °C)   99 °F (37.2 °C)   SpO2: 90% 94% 95% 94%   Weight: 117.1 kg (258 lb 1.6 oz)   117.3 kg (258 lb 9.6 oz)   Height:    5' 9\" (1.753 m)     Temp (24hrs), Av.3 °F (36.8 °C), Min:97.9 °F (36.6 °C), Max:99 °F (37.2 °C)      Intake/Output Summary (Last 24 hours) at 2022 1048  Last data filed at 2022 0532  Gross per 24 hour   Intake 300 ml   Output 550 ml   Net -250 ml       Physical Exam:   General Appearance:   Appears in no acute distress. , sleepy , confuse   HEENT:   Moist oral mucous membranes, conjunctiva clear, , pale   Neck:   Supple  Lungs: No wheezes. , No rales. , Normal respiratory effort,   Heart:   Regular rate and rhythm  Abdomen:   Soft , Non-distended and Non-tender,   Extremities:   + edema of legs, Rt AKA  Neuro:   alert, oriented, moves all extremities well    Current medications:     Current Facility-Administered Medications   Medication Dose Route Frequency    piperacillin-tazobactam (ZOSYN) 3.375 g in 0.9% sodium chloride (MBP/ADV) 100 mL MBP  3.375 g IntraVENous Q12H    nystatin (MYCOSTATIN) 100,000 unit/gram powder   Topical BID    traZODone (DESYREL) tablet 150 mg  150 mg Oral QHS    gabapentin (NEURONTIN) capsule 400 mg  400 mg Oral TID    busPIRone (BUSPAR) tablet 15 mg  15 mg Oral TID    guaiFENesin (ROBITUSSIN) 100 mg/5 mL oral liquid 100 mg  100 mg Oral Q6H PRN    sodium chloride (NS) flush 5-10 mL  5-10 mL IntraVENous PRN    amiodarone (CORDARONE) tablet 200 mg  200 mg Oral DAILY    B complex-vitamin C-folic acid (NEPHRO-RISHABH) 0.8 mg tab  1 Tablet Oral DAILY    cranberry extract tablet 450 mg- PT MUST BRING FROM HOME (Patient Supplied)  1 Tablet Oral DAILY    apixaban (ELIQUIS) tablet 2.5 mg  2.5 mg Oral BID    ferrous sulfate tablet 325 mg  1 Tablet Oral DAILY    cetirizine (ZYRTEC) tablet 10 mg  10 mg Oral DAILY    levothyroxine (SYNTHROID) tablet 200 mcg  200 mcg Oral ACB    magnesium oxide (MAG-OX) tablet 400 mg  400 mg Oral BID    omega 3-DHA-EPA-fish oil 1,000 mg (120 mg-180 mg) capsule 1 Capsule- PT MUST BRING FROM HOME (Patient Supplied)  1 Capsule Oral DAILY    pantoprazole (PROTONIX) tablet 40 mg  40 mg Oral ACB    therapeutic multivitamin (THERAGRAN) tablet 1 Tablet  1 Tablet Oral DAILY          Laboratory and Radiology Data :      No results found for: FERR  WBC   Date Value Ref Range Status   01/09/2022 23.1 (H) 4.6 - 13.2 K/uL Final     No results found for: DELILAH  No results found for: UEO    Microbiology    No results found for: GMS    Recent Results (from the past 24 hour(s))   CBC WITH AUTOMATED DIFF    Collection Time: 01/09/22  3:45 AM   Result Value Ref Range    WBC 23.1 (H) 4.6 - 13.2 K/uL    RBC 3.08 (L) 4.20 - 5.30 M/uL    HGB 10.7 (L) 12.0 - 16.0 g/dL    HCT 33.2 (L) 35.0 - 45.0 %    .8 (H) 78.0 - 100.0 FL    MCH 34.7 (H) 24.0 - 34.0 PG    MCHC 32.2 31.0 - 37.0 g/dL    RDW 18.2 (H) 11.6 - 14.5 %    PLATELET 919 277 - 937 K/uL    MPV 10.4 9.2 - 11.8 FL    NRBC 0.1 (H) 0.0  WBC    ABSOLUTE NRBC 0.02 (H) 0.00 - 0.01 K/uL    NEUTROPHILS 89 (H) 40 - 73 %    BAND NEUTROPHILS 1 0 - 5 %    LYMPHOCYTES 7 (L) 21 - 52 %    MONOCYTES 3 3 - 10 %    EOSINOPHILS 0 0 - 5 %    BASOPHILS 0 0 - 2 %    IMMATURE GRANULOCYTES 0 %    ABS. NEUTROPHILS 20.8 (H) 1.8 - 8.0 K/UL    ABS. LYMPHOCYTES 1.6 0.9 - 3.6 K/UL    ABS. MONOCYTES 0.7 0.05 - 1.2 K/UL    ABS. EOSINOPHILS 0.0 0.0 - 0.4 K/UL    ABS. BASOPHILS 0.0 0.0 - 0.1 K/UL    ABS. IMM.  GRANS. 0.0 K/UL    DF Manual      RBC COMMENTS Macrocytosis  1+        RBC COMMENTS Stomatocytes  1+       METABOLIC PANEL, BASIC    Collection Time: 01/09/22  3:45 AM   Result Value Ref Range    Sodium 143 135 - 145 mmol/L    Potassium 4.6 3.2 - 5.1 mmol/L    Chloride 105 94 - 111 mmol/L    CO2 26 21 - 33 mmol/L    Anion gap 12 mmol/L    Glucose 89 70 - 110 mg/dL    BUN 35 (H) 9 - 21 mg/dL    Creatinine 2.80 (H) 0.70 - 1.20 mg/dL    BUN/Creatinine ratio 13      GFR est AA 20 ml/min/1.73m2    GFR est non-AA 16 ml/min/1.73m2    Calcium 7.8 (L) 8.5 - 10.5 mg/dL   MAGNESIUM    Collection Time: 01/09/22  3:45 AM   Result Value Ref Range    Magnesium 3.1 (H) 1.7 - 2.8 mg/dL   PHOSPHORUS    Collection Time: 01/09/22  3:45 AM   Result Value Ref Range    Phosphorus 2.5 2.5 - 4.5 mg/dL       XR Results:  Results from [place] encounter on 01/07/22    XR CHEST PORT    Narrative  EXAM: XR CHEST PORT    CLINICAL INDICATION/HISTORY: COVID positive short of breath  -Additional: None    COMPARISON: 12/30/2021    TECHNIQUE: Frontal view of the chest    _______________    FINDINGS:    HEART AND MEDIASTINUM: Normal cardiac size and mediastinal contours. LUNGS AND PLEURAL SPACES: Faint mid to lower lung zone opacities are noted, left  greater than right without evidence of pneumothorax or pleural effusion. No  dense alveolar consolidation. BONY THORAX AND SOFT TISSUES: Right shoulder arthroplasty. _______________    Impression  Patchy asymmetric areas of subtle interstitial infiltrate, new from prior exam  without superimposed pleural abnormality. CT Results:  Results from Hospital Encounter encounter on 01/07/22    CT CHEST WO CONT    Narrative  EXAM: CT Chest    INDICATION: Covid pneumonia and shortness of breath. COMPARISON: None. TECHNIQUE: Axial CT imaging from the thoracic inlet through the diaphragm  without intravenous contrast. Multiplanar reformations were generated. One or more dose reduction techniques were used on this CT: automated exposure  control, adjustment of the mAs and/or kVp according to patient size, and  iterative reconstruction techniques. The specific techniques used on this CT  exam have been documented in the patient's electronic medical record.   Digital  Imaging and Communications in Medicine (DICOM) format image data are available  to nonaffiliated external healthcare facilities or entities on a secure, media  free, reciprocally searchable basis with patient authorization for at least a  12-month period after this study. _______________    FINDINGS:    LUNGS:  > Multifocal groundglass opacities with accompanying interlobular septal line  thickening, asymmetrically involving the left upper lobe, lingula, and superior  segment left lower lobe. Additional involvement of the paramedian right upper  lobe and right middle lobe medial segment. PLEURA: Unremarkable. AIRWAY: Unremarkable. MEDIASTINUM: Normal cardiac size. Thoracic aorta normal in course and caliber. LYMPH NODES: No enlarged lymph nodes by size criteria. UPPER ABDOMEN: Advanced hepatic steatosis. Partial inclusion postop changes  prior gastric bypass surgical procedure. OSSEOUS STRUCTURES: Indwelling right shoulder arthroplasty. Chronic fracture  deformity proximal left humerus. OTHER:    _______________    Impression  1. Asymmetrically distributed multifocal groundglass and interstitial  infiltrates compatible with Covid pneumonia.  > No evidence of pneumomediastinum, pneumothorax, or pleural effusion. 2. Marked hepatic steatosis. MRI Results:  No results found for this or any previous visit. Nuclear Medicine Results:  No results found for this or any previous visit. US Results:  No results found for this or any previous visit. IR Results:  No results found for this or any previous visit. VAS/US Results:  No results found for this or any previous visit. Clarke Ortega M.D.   Hospitalist

## 2022-01-09 NOTE — PROGRESS NOTES
Comprehensive Nutrition Assessment    Type and Reason for Visit: Initial,Positive nutrition screen    Nutrition Recommendations/Plan: Cardiac restricted diet with ensure protein max daily     Nutrition Assessment:  78 yo female PMH: obesity, HTN, HLP, hypothyroidism, right AKA, CHF   morbidly obese BMI 38.2 came from [facility] with AMS and low O2 sat Rapid COVID was postive. Pt also with stage 2 ulcer to sacrum pt is bedbound. Trend K+ as pt with K+ of 5.3 on admission required kayexalate. CHF hx recommend cardiac diet and increased protein needs for wound healing recommend low Calorie high protein supplement daily ensure protein max. Recent Results (from the past 24 hour(s))   CBC WITH AUTOMATED DIFF    Collection Time: 01/09/22  3:45 AM   Result Value Ref Range    WBC 23.1 (H) 4.6 - 13.2 K/uL    RBC 3.08 (L) 4.20 - 5.30 M/uL    HGB 10.7 (L) 12.0 - 16.0 g/dL    HCT 33.2 (L) 35.0 - 45.0 %    .8 (H) 78.0 - 100.0 FL    MCH 34.7 (H) 24.0 - 34.0 PG    MCHC 32.2 31.0 - 37.0 g/dL    RDW 18.2 (H) 11.6 - 14.5 %    PLATELET 808 702 - 271 K/uL    MPV 10.4 9.2 - 11.8 FL    NRBC 0.1 (H) 0.0  WBC    ABSOLUTE NRBC 0.02 (H) 0.00 - 0.01 K/uL    NEUTROPHILS 89 (H) 40 - 73 %    BAND NEUTROPHILS 1 0 - 5 %    LYMPHOCYTES 7 (L) 21 - 52 %    MONOCYTES 3 3 - 10 %    EOSINOPHILS 0 0 - 5 %    BASOPHILS 0 0 - 2 %    IMMATURE GRANULOCYTES 0 %    ABS. NEUTROPHILS 20.8 (H) 1.8 - 8.0 K/UL    ABS. LYMPHOCYTES 1.6 0.9 - 3.6 K/UL    ABS. MONOCYTES 0.7 0.05 - 1.2 K/UL    ABS. EOSINOPHILS 0.0 0.0 - 0.4 K/UL    ABS. BASOPHILS 0.0 0.0 - 0.1 K/UL    ABS. IMM.  GRANS. 0.0 K/UL    DF Manual      RBC COMMENTS Macrocytosis  1+        RBC COMMENTS Stomatocytes  1+       METABOLIC PANEL, BASIC    Collection Time: 01/09/22  3:45 AM   Result Value Ref Range    Sodium 143 135 - 145 mmol/L    Potassium 4.6 3.2 - 5.1 mmol/L    Chloride 105 94 - 111 mmol/L    CO2 26 21 - 33 mmol/L    Anion gap 12 mmol/L    Glucose 89 70 - 110 mg/dL    BUN 35 (H) 9 - 21 mg/dL    Creatinine 2.80 (H) 0.70 - 1.20 mg/dL    BUN/Creatinine ratio 13      GFR est AA 20 ml/min/1.73m2    GFR est non-AA 16 ml/min/1.73m2    Calcium 7.8 (L) 8.5 - 10.5 mg/dL   MAGNESIUM    Collection Time: 01/09/22  3:45 AM   Result Value Ref Range    Magnesium 3.1 (H) 1.7 - 2.8 mg/dL   PHOSPHORUS    Collection Time: 01/09/22  3:45 AM   Result Value Ref Range    Phosphorus 2.5 2.5 - 4.5 mg/dL       Malnutrition Assessment:  Malnutrition Status:  Insufficient data (COVID positive)    Context:  Acute illness     Findings of the 6 clinical characteristics of malnutrition:   Energy Intake:  Mild decrease in energy intake (specify) (decreased intake with AMS)  Weight Loss:  No significant weight loss     Body Fat Loss:  Unable to assess,     Muscle Mass Loss:  Unable to assess,    Fluid Accumulation:  Unable to assess,     Strength:  Not performed         Estimated Daily Nutrient Needs:  Energy (kcal): 6648-5066 kcal/day; Weight Used for Energy Requirements: Admission (117 kg)  Protein (g):  g/day; Weight Used for Protein Requirements: Admission (0.8-1 g/kg)  Fluid (ml/day): 8260-2785 mL/day; Method Used for Fluid Requirements: 1 ml/kcal      Nutrition Related Findings:  morbidly obese BMI 38.2 came from [facility] with AMS and low O2 sat Rapid COVID was postive. Pt also with stage 2 ulcer to sacrum pt is bedbound. Wounds:    Stage II (sacrum)       Current Nutrition Therapies:  ADULT DIET Regular    Anthropometric Measures:  · Height:  5' 9\" (175.3 cm)  · Current Body Wt:  117 kg (258 lb)   · Admission Body Wt:  258 lb    · Usual Body Wt:        · Ideal Body Wt:  145 lbs:  177.9 %   · Adjusted Body Weight:  284.1; Weight Adjustment for: Amputation   · Adjusted BMI:  41.9    · BMI Category:  Obese class 2 (BMI 35.0-39. 9)       Nutrition Diagnosis:   · Increased nutrient needs related to other (specify) (wound healing) as evidenced by wounds      Nutrition Interventions:   Food and/or Nutrient Delivery: Modify current diet,Start oral nutrition supplement  Nutrition Education and Counseling: Education not appropriate  Coordination of Nutrition Care: Continue to monitor while inpatient    Goals:  Pt to eat greater than 75% of meals and supplement, BM every 1-3 days,       Nutrition Monitoring and Evaluation:   Behavioral-Environmental Outcomes:    Food/Nutrient Intake Outcomes: Food and nutrient intake,Supplement intake  Physical Signs/Symptoms Outcomes: Meal time behavior,Weight    1/12/2022    Discharge Planning:    Continue current diet     Electronically signed by Tressa Chawla on 1/9/2022 at 11:31 AM    Contact: [phone]

## 2022-01-09 NOTE — PROGRESS NOTES
Care Management Interventions  PCP Verified by CM: Yes (NH MD)  Palliative Care Criteria Met (RRAT>21 & CHF Dx)?: No  Mode of Transport at Discharge: Other (see comment) (DNR)  Transition of Care Consult (CM Consult): SNF  Partner SNF: No  Reason Why Partner SNF Not Chosen: Friend/family recommendation  MyChart Signup: No  Discharge Durable Medical Equipment: No  Health Maintenance Reviewed: Yes  Physical Therapy Consult: No  Occupational Therapy Consult: No  Speech Therapy Consult: No  Support Systems: Child(paulino)  Confirm Follow Up Transport: Family  The Patient and/or Patient Representative was Provided with a Choice of Provider and Agrees with the Discharge Plan?: Yes  Freedom of Choice List was Provided with Basic Dialogue that Supports the Patient's Individualized Plan of Care/Goals, Treatment Preferences and Shares the Quality Data Associated with the Providers?: Yes  Discharge Location  Discharge Placement: Home   Reason for Admission: Chart reviewed and noted patient presented from [address] [place] [address] and Rehab to [facility] ER for altered mental status and low oxygen saturation. Pt is lethargic, WBC- 17. CXR- Shows patchy asymmetric areas of subpleural interstitial infiltrates compatible with Covid pneumonia. Rapid Covid-19 test was positive, Lactic was 2.4, Procalcitonin- 6.65. Dx: Sepsis, Covid-19    PMH: Covid-19 infection, Morbid Obesity, HTN, HLP, Hypothyroidism, Right AKA, CHF                       RUR Score: 21%                  PCP: First and Last name:   Annalise Moeller MD- Pt will need to be assigned a PCP prior to discharge. Name of Practice:    Are you a current patient: Yes/No:    Approximate date of last visit:    Can you participate in a virtual visit if needed:     Do you (patient/family) have any concerns for transition/discharge?  No                  Plan for utilizing home health: TBD      Current Advanced Directive/Advance Care Plan:  DNR      Healthcare Decision Maker: Daughter- Radha Ryder Johann Crossa- [phone]   Click here to complete [address] including selection of the Healthcare Decision Maker Relationship (ie \"Primary\")              Transition of Care Plan: Discharge planning is aimed at transition back to [address] [place] [address] per patient's daughter, Estella Razo.

## 2022-01-09 NOTE — PROGRESS NOTES
I was called to the bedside by the nurse patient's oxygen demand has been increasing she is on 6 L nasal cannula. Her skin is pale. Her potassium keeps going up it is 4.6 this morning. Patient skin is weeping she is edematous. Patient is stating that she does not want anything done that she does not want anything when she is asked questions. At this point in time there needs to be a conversation with her POA. I will inform the oncoming physician this morning.     Toma Whiting ENP-C, FNP-C

## 2022-01-09 NOTE — PROGRESS NOTES
Was notified by supervisor that patient was in SVT rate of 180s, by time arrived to the unit patient had converted to sinus tachycardia rate of 108, patient was asymptomatic, denies chest pain and shortness of breath, and no noted palpitations. Asked nursing to monitor the patient closely and will place order for cardiology consultation. We will place order for stat magnesium level since normal magnesium levels checked this morning.

## 2022-01-10 NOTE — PROGRESS NOTES
Hospitalist Progress Note     INTERNAL MEDICINE PROGRESS NOTE  Patient: Jose Yusuf   YOB: 1945   MRN: [id number]      Hospital course / Assessment    Principle Problems:  Severe Sepsis ,   Bacteremia 1/2   Covid 19 Pneumonia   Urinary tract infection  Acute hypoxic respiratory failure   Anasarca   - suspect 2/2 COVID-19 pneumonia and Bacteremia, -180, leukocytosis, Hypoxia   -Noncontrast chest CT suggest asymmetrically distributed multifocal groundglass and interstitial infiltrates compatible with Covid pneumonia. -rapid covid Positive. , elevated lactic,  Procalcitonin is 6.65, UA showed UTI,   -admit to inpatient, monitor on telemetry, IVF support, Empirical antibiotic., repeat blood culture  - D/w Daughter - iZggy Javier-  And the plan to monitor her condition on antibiotic for next 24hr and if no response then the level of care will change to CMO , today called back and the family request to change the level of care to [address] [place] [address] and in 2 days d/c to [facility] on hospice.   - Still require high flow O2, tachy, hypotensive, renal function worsening, leukocytosis   Acute kidney injury: worsening  -Cr 2.5 her baseline Cr level ~1.2,  treated with IVF support and avoiding nephrotoxin, Worsening    Hyperkalemia:  -K level of 5.3, mild. Give kayexelate x 1., corrected   Hypertension:  -chronic,  Not currently on antihypertensives due to hypotension .  Monitor. Hypothyroidism:  -chronic, continue levothyroxine. Chronic anticoagulation with Eliquis, continue. H/O Right AKA  Anemia chronic - dropped to 8.9       Code Status: Full code   DVT prophylaxis: pharmacologic and mechanical      Today Recommendation and Plan:   Change the level of care to comfort measures only   In 2 days back to [facility] on hospice     Disposition    Disposition: TBD     Subjective / ROS:   Patient sleepy, not in pain , states I am fine.    She still tachy, hypotensive, worsening renal function         Medical Decision Making   Chart, Images and Lab data reviewed, necessary medical Orders placed   Discussed with nursing staff     Vitals:    22 2349 01/10/22 0400 01/10/22 0500 01/10/22 0840   BP:   (!) 93/46    Pulse: (!) 106 (!) 101 (!) 101    Resp:   18    Temp:   100 °F (37.8 °C)    SpO2:   94% 93%   Weight:       Height:         Temp (24hrs), Av.3 °F (37.4 °C), Min:98.3 °F (36.8 °C), Max:100 °F (37.8 °C)      Intake/Output Summary (Last 24 hours) at 1/10/2022 0920  Last data filed at 2022 1807  Gross per 24 hour   Intake    Output 50 ml   Net -50 ml       Physical Exam:   General Appearance:   Appears in no acute distress. , sleepy , confuse   HEENT:   Moist oral mucous membranes, conjunctiva clear, , pale   Neck:   Supple  Lungs: No wheezes. , No rales. , Normal respiratory effort,   Heart:   Regular rate and rhythm  Abdomen:   Soft , Non-distended and Non-tender,   Extremities:   + edema of legs, Rt AKA  Neuro:   Sleepy     Current medications:     Current Facility-Administered Medications   Medication Dose Route Frequency    piperacillin-tazobactam (ZOSYN) 3.375 g in 0.9% sodium chloride (MBP/ADV) 100 mL MBP  3.375 g IntraVENous Q12H    vancomycin (VANCOCIN) 750 mg in 0.9% sodium chloride 250 mL (VIAL-MATE)  750 mg IntraVENous Q24H    VANCOMYCIN TROUGH DUE 2330 ON 22  1 Each Other Rx Dosing/Monitoring    nystatin (MYCOSTATIN) 100,000 unit/gram powder   Topical BID    traZODone (DESYREL) tablet 150 mg  150 mg Oral QHS    gabapentin (NEURONTIN) capsule 400 mg  400 mg Oral TID    busPIRone (BUSPAR) tablet 15 mg  15 mg Oral TID    guaiFENesin (ROBITUSSIN) 100 mg/5 mL oral liquid 100 mg  100 mg Oral Q6H PRN    sodium chloride (NS) flush 5-10 mL  5-10 mL IntraVENous PRN    amiodarone (CORDARONE) tablet 200 mg  200 mg Oral DAILY    B complex-vitamin C-folic acid (NEPHRO-RISHABH) 0.8 mg tab  1 Tablet Oral DAILY    apixaban (ELIQUIS) tablet 2.5 mg  2.5 mg Oral BID    ferrous sulfate tablet 325 mg  1 Tablet Oral DAILY    cetirizine (ZYRTEC) tablet 10 mg  10 mg Oral DAILY    levothyroxine (SYNTHROID) tablet 200 mcg  200 mcg Oral ACB    magnesium oxide (MAG-OX) tablet 400 mg  400 mg Oral BID    pantoprazole (PROTONIX) tablet 40 mg  40 mg Oral ACB    therapeutic multivitamin (THERAGRAN) tablet 1 Tablet  1 Tablet Oral DAILY          Laboratory and Radiology Data :      No results found for: FERR  WBC   Date Value Ref Range Status   01/10/2022 19.8 (H) 4.6 - 13.2 K/uL Final     No results found for: DELILAH  No results found for: UEO    Microbiology    No results found for: GMS    Recent Results (from the past 24 hour(s))   CBC WITH AUTOMATED DIFF    Collection Time: 01/10/22  4:30 AM   Result Value Ref Range    WBC 19.8 (H) 4.6 - 13.2 K/uL    RBC 2.60 (L) 4.20 - 5.30 M/uL    HGB 8.9 (L) 12.0 - 16.0 g/dL    HCT 28.9 (L) 35.0 - 45.0 %    .2 (H) 78.0 - 100.0 FL    MCH 34.2 (H) 24.0 - 34.0 PG    MCHC 30.8 (L) 31.0 - 37.0 g/dL    RDW 17.6 (H) 11.6 - 14.5 %    PLATELET 222 854 - 424 K/uL    MPV 10.2 9.2 - 11.8 FL    NRBC 0.2 (H) 0.0  WBC    ABSOLUTE NRBC 0.04 (H) 0.00 - 0.01 K/uL    NEUTROPHILS 87 (H) 40 - 73 %    LYMPHOCYTES 9 (L) 21 - 52 %    MONOCYTES 4 3 - 10 %    EOSINOPHILS 0 0 - 5 %    BASOPHILS 0 0 - 2 %    IMMATURE GRANULOCYTES 0 %    ABS. NEUTROPHILS 17.2 (H) 1.8 - 8.0 K/UL    ABS. LYMPHOCYTES 1.8 0.9 - 3.6 K/UL    ABS. MONOCYTES 0.8 0.05 - 1.2 K/UL    ABS. EOSINOPHILS 0.0 0.0 - 0.4 K/UL    ABS. BASOPHILS 0.0 0.0 - 0.1 K/UL    ABS. IMM.  GRANS. 0.0 K/UL    DF Manual      RBC COMMENTS Macrocytosis  2+        RBC COMMENTS Hypochromia  1+        RBC COMMENTS Stomatocytes  3+       METABOLIC PANEL, BASIC    Collection Time: 01/10/22  4:30 AM   Result Value Ref Range    Sodium 143 135 - 145 mmol/L    Potassium 3.4 3.2 - 5.1 mmol/L    Chloride 106 94 - 111 mmol/L    CO2 28 21 - 33 mmol/L    Anion gap 9 mmol/L    Glucose 100 70 - 110 mg/dL    BUN 42 (H) 9 - 21 mg/dL    Creatinine 3.20 (H) 0.70 - 1.20 mg/dL    BUN/Creatinine ratio 13      GFR est AA 17 ml/min/1.73m2    GFR est non-AA 14 ml/min/1.73m2    Calcium 7.3 (L) 8.5 - 10.5 mg/dL       XR Results:  Results from Hospital Encounter encounter on 01/07/22    XR CHEST PORT    Narrative  EXAM: XR CHEST PORT    CLINICAL INDICATION/HISTORY: COVID positive short of breath  -Additional: None    COMPARISON: 12/30/2021    TECHNIQUE: Frontal view of the chest    _______________    FINDINGS:    HEART AND MEDIASTINUM: Normal cardiac size and mediastinal contours. LUNGS AND PLEURAL SPACES: Faint mid to lower lung zone opacities are noted, left  greater than right without evidence of pneumothorax or pleural effusion. No  dense alveolar consolidation. BONY THORAX AND SOFT TISSUES: Right shoulder arthroplasty. _______________    Impression  Patchy asymmetric areas of subtle interstitial infiltrate, new from prior exam  without superimposed pleural abnormality. CT Results:  Results from Hospital Encounter encounter on 01/07/22    CT CHEST WO CONT    Narrative  EXAM: CT Chest    INDICATION: Covid pneumonia and shortness of breath. COMPARISON: None. TECHNIQUE: Axial CT imaging from the thoracic inlet through the diaphragm  without intravenous contrast. Multiplanar reformations were generated. One or more dose reduction techniques were used on this CT: automated exposure  control, adjustment of the mAs and/or kVp according to patient size, and  iterative reconstruction techniques. The specific techniques used on this CT  exam have been documented in the patient's electronic medical record. Digital  Imaging and Communications in Medicine (DICOM) format image data are available  to nonaffiliated external healthcare facilities or entities on a secure, media  free, reciprocally searchable basis with patient authorization for at least a  12-month period after this study.     _______________    FINDINGS:    LUNGS:  > Multifocal groundglass opacities with accompanying interlobular septal line  thickening, asymmetrically involving the left upper lobe, lingula, and superior  segment left lower lobe. Additional involvement of the paramedian right upper  lobe and right middle lobe medial segment. PLEURA: Unremarkable. AIRWAY: Unremarkable. MEDIASTINUM: Normal cardiac size. Thoracic aorta normal in course and caliber. LYMPH NODES: No enlarged lymph nodes by size criteria. UPPER ABDOMEN: Advanced hepatic steatosis. Partial inclusion postop changes  prior gastric bypass surgical procedure. OSSEOUS STRUCTURES: Indwelling right shoulder arthroplasty. Chronic fracture  deformity proximal left humerus. OTHER:    _______________    Impression  1. Asymmetrically distributed multifocal groundglass and interstitial  infiltrates compatible with Covid pneumonia.  > No evidence of pneumomediastinum, pneumothorax, or pleural effusion. 2. Marked hepatic steatosis. MRI Results:  No results found for this or any previous visit. Nuclear Medicine Results:  No results found for this or any previous visit. US Results:  No results found for this or any previous visit. IR Results:  No results found for this or any previous visit. VAS/US Results:  No results found for this or any previous visit. Yaa Vu M.D.   Hospitalist

## 2022-01-10 NOTE — PROGRESS NOTES
Cardiology consultation noted and appreciated. Chart reviewed and discussed with nursing staff. Patient has been made comfort care with plans to return back to the nursing home on hospice. We will defer cardiology consultation at this time. Should any acute needs arise, please reconsult. Thank you for this consultation.

## 2022-01-11 NOTE — DEATH NOTE
[facility] notified at this time. Patient is not a candidate for organ donation due to her Covid 19 diagnosis.

## 2022-01-11 NOTE — DISCHARGE SUMMARY
Death Summary    Patient ID:  Rahel Hurt, 77 y.o., female  : 1945    Admit Date: 2022  Discharge Date: No discharge date for patient encounter. Discharge Date:  No discharge date for patient encounter. PCP:  Uma Moeller MD    Chief Complaint   Patient presents with    Other     sent from [facility] for hypoxia     Hospital Problems  Date Reviewed: 2021          Codes Class Noted POA    Hypoxia ICD-10-CM: R09.02  ICD-9-CM: 799.02  2022 Unknown        Sepsis ([facility] [address] ICD-10-CM: A41.9  ICD-9-CM: 038.9, 995.91  2022 Unknown        Pneumonia due to COVID-19 virus ICD-10-CM: U07.1, J12.82  ICD-9-CM: 480.8, 079.89  2022 Unknown              The Cause of Death:   Severe Sepsis ,   Bacteremia   Covid 19 Pneumonia   Urinary tract infection  Acute hypoxic respiratory failure   Anasarca   Acute kidney injury: worsening  Hyperkalemia:    HPI on Admission (per admitting physician):  Rahel Hurt is a 77 y.o. female with a past medical history significant for COVID-19 infection, morbid obesity, HTN, HLP, hypothyroidism, Right AKA, and CHF who was sent over to the ED today from [address] with complaints of altered mental status and low oxygen saturation. At the time of my evaluation in the ED, patient appears lethargic, and not able to contribute to history, hence I am unable to obtain ROS. ED work-up shows a white count of 17, chest x-ray shows patchy asymmetric areas of subpleural interstitial infiltrates, new from prior exam.  Noncontrast chest CT suggests asymmetrically distributed multifocal groundglass and interstitial infiltrates compatible with Covid pneumonia. Rapid COVID-19 test was positive. lactic was 2.4. Procalcitonin 6.65.   Hospitalist was asked to admit    Hospital Course:    Severe Sepsis ,   Bacteremia   Covid 19 Pneumonia   Urinary tract infection  Acute hypoxic respiratory failure   Anasarca   Acute kidney injury: worsening  Hyperkalemia:  Hypertension:  Hypothyroidism:  Chronic anticoagulation with Eliquis  H/O Right AKA  Anemia chronic     Family changed the level of care to [address] [place] [address]    Expiration time: 22  11:05 AM  Discharge Condition:      Treatment Team: Treatment Team: Attending Provider: Makayla Moe MD; Nurse Practitioner: Angi Damon NP; Utilization Review: Osman Gerene;  Consulting Provider: Hilary Castro MD; Utilization Review: Qi Babin RN; Primary Nurse: Philly Freire MD  Hospitalist

## 2022-01-11 NOTE — PROGRESS NOTES
Patient grimacing and tearing up after being repositioned in bed and when asked if she was hurting she said, \"pain, pain, pain\".

## 2022-01-11 NOTE — PROGRESS NOTES
The supervisor came to the room patient is yelling from the room she appears to be in pain. Patient is comfort care Ativan as ordered every 4 and morphine inhalation as ordered every 4 I changed morphine to every 3 I V.     Blanche Collet ENP-C, FNP-C

## 2022-01-11 NOTE — PROGRESS NOTES
Patient prepared to be picked up by the  home, [address] [address] home called at the families request.

## 2022-01-11 NOTE — PROGRESS NOTES
Robyn care of pt. Pt granddaughter called for update. Pt is lethargic and keeps stating that she wants to go home. Vitals obtained and gave pt some water. Pt tolerated fairly. It is clear that she is very weak. She can barely hold up her head to drink. 2030- Called granddaughter back to give her an update. She said she will call in the morning when the  is here. 2250- Scheduled medication given per MAR. Pt tolerated fairly. Used mouth wash swabs to clean mouth it looked very dry.

## 2022-01-11 NOTE — PROGRESS NOTES
Entered patients room to ensure she was cleaned up and ready for her conference call with her family. The patient was found not breathing and slumped over in bed. I notified Stephanie Bernard RN, DANTE Thao, and DR Stallings. Patient was pronounced at  by DR Stallings.

## 2022-01-14 LAB
BACTERIA SPEC CULT: NORMAL
SPECIAL REQUESTS,SREQ: NORMAL

## 2022-01-15 LAB
BACTERIA SPEC CULT: NORMAL
SPECIAL REQUESTS,SREQ: NORMAL

## 2022-11-18 NOTE — ROUTINE PROCESS
Pt in bed watching tv. Pt asked for cell phone off of the . Pt expressed no concerns or needs at this present moment. Call bell was given and safety measures are intact. Birth Control Pills Counseling: Birth Control Pill Counseling: I discussed with the patient the potential side effects of OCPs including but not limited to increased risk of stroke, heart attack, thrombophlebitis, deep venous thrombosis, hepatic adenomas, breast changes, GI upset, headaches, and depression.  The patient verbalized understanding of the proper use and possible adverse effects of OCPs. All of the patient's questions and concerns were addressed.

## 2023-04-28 NOTE — PROGRESS NOTES
EMR entered for the purpose of chart review in the course of functions and responsibilities related to performance improvement Satisfactory